# Patient Record
Sex: MALE | Race: WHITE | NOT HISPANIC OR LATINO | Employment: OTHER
[De-identification: names, ages, dates, MRNs, and addresses within clinical notes are randomized per-mention and may not be internally consistent; named-entity substitution may affect disease eponyms.]

---

## 2017-01-30 ENCOUNTER — RECORDS - HEALTHEAST (OUTPATIENT)
Dept: ADMINISTRATIVE | Facility: OTHER | Age: 72
End: 2017-01-30

## 2017-02-08 ENCOUNTER — COMMUNICATION - HEALTHEAST (OUTPATIENT)
Dept: FAMILY MEDICINE | Facility: CLINIC | Age: 72
End: 2017-02-08

## 2017-02-08 DIAGNOSIS — I10 BENIGN ESSENTIAL HYPERTENSION: ICD-10-CM

## 2017-04-10 ENCOUNTER — COMMUNICATION - HEALTHEAST (OUTPATIENT)
Dept: FAMILY MEDICINE | Facility: CLINIC | Age: 72
End: 2017-04-10

## 2017-04-10 DIAGNOSIS — E11.9 DM (DIABETES MELLITUS) (H): ICD-10-CM

## 2017-04-24 ENCOUNTER — OFFICE VISIT - HEALTHEAST (OUTPATIENT)
Dept: FAMILY MEDICINE | Facility: CLINIC | Age: 72
End: 2017-04-24

## 2017-04-24 ENCOUNTER — COMMUNICATION - HEALTHEAST (OUTPATIENT)
Dept: FAMILY MEDICINE | Facility: CLINIC | Age: 72
End: 2017-04-24

## 2017-04-24 DIAGNOSIS — J32.9 SINUSITIS: ICD-10-CM

## 2017-04-24 DIAGNOSIS — E11.9 DM2 (DIABETES MELLITUS, TYPE 2) (H): ICD-10-CM

## 2017-04-24 LAB — HBA1C MFR BLD: 7 % (ref 3.5–6)

## 2017-04-24 ASSESSMENT — MIFFLIN-ST. JEOR: SCORE: 1480.62

## 2017-05-09 ENCOUNTER — COMMUNICATION - HEALTHEAST (OUTPATIENT)
Dept: FAMILY MEDICINE | Facility: CLINIC | Age: 72
End: 2017-05-09

## 2017-05-09 DIAGNOSIS — I10 BENIGN ESSENTIAL HYPERTENSION: ICD-10-CM

## 2017-05-26 ENCOUNTER — RECORDS - HEALTHEAST (OUTPATIENT)
Dept: ADMINISTRATIVE | Facility: OTHER | Age: 72
End: 2017-05-26

## 2017-06-12 ENCOUNTER — RECORDS - HEALTHEAST (OUTPATIENT)
Dept: ADMINISTRATIVE | Facility: OTHER | Age: 72
End: 2017-06-12

## 2017-07-03 ENCOUNTER — COMMUNICATION - HEALTHEAST (OUTPATIENT)
Dept: FAMILY MEDICINE | Facility: CLINIC | Age: 72
End: 2017-07-03

## 2017-07-03 DIAGNOSIS — E11.9 DM (DIABETES MELLITUS) (H): ICD-10-CM

## 2017-07-03 DIAGNOSIS — E11.9 DIABETES MELLITUS (H): ICD-10-CM

## 2017-07-26 ENCOUNTER — OFFICE VISIT - HEALTHEAST (OUTPATIENT)
Dept: FAMILY MEDICINE | Facility: CLINIC | Age: 72
End: 2017-07-26

## 2017-07-26 ENCOUNTER — COMMUNICATION - HEALTHEAST (OUTPATIENT)
Dept: FAMILY MEDICINE | Facility: CLINIC | Age: 72
End: 2017-07-26

## 2017-07-26 DIAGNOSIS — E11.9 DM2 (DIABETES MELLITUS, TYPE 2) (H): ICD-10-CM

## 2017-07-26 DIAGNOSIS — B07.9 VERRUCA VULGARIS: ICD-10-CM

## 2017-07-26 DIAGNOSIS — I10 ESSENTIAL HYPERTENSION, BENIGN: ICD-10-CM

## 2017-07-26 LAB — HBA1C MFR BLD: 6.6 % (ref 3.5–6)

## 2017-07-26 ASSESSMENT — MIFFLIN-ST. JEOR: SCORE: 1467.01

## 2017-08-16 ENCOUNTER — OFFICE VISIT - HEALTHEAST (OUTPATIENT)
Dept: PODIATRY | Age: 72
End: 2017-08-16

## 2017-08-16 DIAGNOSIS — L84 CALLUS OF FOOT: ICD-10-CM

## 2017-08-16 DIAGNOSIS — R22.41 FOOT MASS, RIGHT: ICD-10-CM

## 2017-08-16 ASSESSMENT — MIFFLIN-ST. JEOR: SCORE: 1467.01

## 2017-09-01 ENCOUNTER — COMMUNICATION - HEALTHEAST (OUTPATIENT)
Dept: FAMILY MEDICINE | Facility: CLINIC | Age: 72
End: 2017-09-01

## 2017-09-01 DIAGNOSIS — E11.9 DM (DIABETES MELLITUS) (H): ICD-10-CM

## 2017-12-12 ENCOUNTER — COMMUNICATION - HEALTHEAST (OUTPATIENT)
Dept: FAMILY MEDICINE | Facility: CLINIC | Age: 72
End: 2017-12-12

## 2017-12-12 DIAGNOSIS — E11.9 DM2 (DIABETES MELLITUS, TYPE 2) (H): ICD-10-CM

## 2018-01-03 ENCOUNTER — COMMUNICATION - HEALTHEAST (OUTPATIENT)
Dept: LAB | Facility: CLINIC | Age: 73
End: 2018-01-03

## 2018-01-03 ENCOUNTER — OFFICE VISIT - HEALTHEAST (OUTPATIENT)
Dept: FAMILY MEDICINE | Facility: CLINIC | Age: 73
End: 2018-01-03

## 2018-01-03 DIAGNOSIS — E11.9 DM2 (DIABETES MELLITUS, TYPE 2) (H): ICD-10-CM

## 2018-01-03 DIAGNOSIS — M50.30 DEGENERATION OF CERVICAL INTERVERTEBRAL DISC: ICD-10-CM

## 2018-01-03 LAB
CHOLEST SERPL-MCNC: 170 MG/DL
CREAT UR-MCNC: 103.6 MG/DL
FASTING STATUS PATIENT QL REPORTED: NO
HBA1C MFR BLD: 6.9 % (ref 3.5–6)
HDLC SERPL-MCNC: 49 MG/DL
LDLC SERPL CALC-MCNC: 101 MG/DL
MICROALBUMIN UR-MCNC: 7.87 MG/DL (ref 0–1.99)
MICROALBUMIN/CREAT UR: 76 MG/G
TRIGL SERPL-MCNC: 98 MG/DL

## 2018-01-03 ASSESSMENT — MIFFLIN-ST. JEOR: SCORE: 1498.53

## 2018-01-04 ENCOUNTER — COMMUNICATION - HEALTHEAST (OUTPATIENT)
Dept: FAMILY MEDICINE | Facility: CLINIC | Age: 73
End: 2018-01-04

## 2018-05-02 ENCOUNTER — COMMUNICATION - HEALTHEAST (OUTPATIENT)
Dept: FAMILY MEDICINE | Facility: CLINIC | Age: 73
End: 2018-05-02

## 2018-05-02 DIAGNOSIS — I10 BENIGN ESSENTIAL HYPERTENSION: ICD-10-CM

## 2018-05-25 ENCOUNTER — RECORDS - HEALTHEAST (OUTPATIENT)
Dept: ADMINISTRATIVE | Facility: OTHER | Age: 73
End: 2018-05-25

## 2018-06-04 ENCOUNTER — OFFICE VISIT - HEALTHEAST (OUTPATIENT)
Dept: FAMILY MEDICINE | Facility: CLINIC | Age: 73
End: 2018-06-04

## 2018-06-04 DIAGNOSIS — R22.42 FOOT MASS, LEFT: ICD-10-CM

## 2018-06-04 DIAGNOSIS — I10 ESSENTIAL HYPERTENSION, BENIGN: ICD-10-CM

## 2018-06-04 DIAGNOSIS — E11.9 DM2 (DIABETES MELLITUS, TYPE 2) (H): ICD-10-CM

## 2018-06-04 LAB
ALBUMIN SERPL-MCNC: 3.9 G/DL (ref 3.5–5)
ALBUMIN UR-MCNC: NEGATIVE MG/DL
ALP SERPL-CCNC: 88 U/L (ref 45–120)
ALT SERPL W P-5'-P-CCNC: 19 U/L (ref 0–45)
ANION GAP SERPL CALCULATED.3IONS-SCNC: 8 MMOL/L (ref 5–18)
APPEARANCE UR: CLEAR
AST SERPL W P-5'-P-CCNC: 22 U/L (ref 0–40)
BACTERIA #/AREA URNS HPF: ABNORMAL HPF
BILIRUB SERPL-MCNC: 0.5 MG/DL (ref 0–1)
BILIRUB UR QL STRIP: NEGATIVE
BUN SERPL-MCNC: 21 MG/DL (ref 8–28)
CALCIUM SERPL-MCNC: 9.1 MG/DL (ref 8.5–10.5)
CHLORIDE BLD-SCNC: 109 MMOL/L (ref 98–107)
CO2 SERPL-SCNC: 24 MMOL/L (ref 22–31)
COLOR UR AUTO: YELLOW
CREAT SERPL-MCNC: 0.77 MG/DL (ref 0.7–1.3)
GFR SERPL CREATININE-BSD FRML MDRD: >60 ML/MIN/1.73M2
GLUCOSE BLD-MCNC: 125 MG/DL (ref 70–125)
GLUCOSE UR STRIP-MCNC: NEGATIVE MG/DL
HBA1C MFR BLD: 6.7 % (ref 3.5–6)
HGB UR QL STRIP: ABNORMAL
KETONES UR STRIP-MCNC: NEGATIVE MG/DL
LEUKOCYTE ESTERASE UR QL STRIP: NEGATIVE
NITRATE UR QL: NEGATIVE
PH UR STRIP: 5.5 [PH] (ref 5–8)
POTASSIUM BLD-SCNC: 4 MMOL/L (ref 3.5–5)
PROT SERPL-MCNC: 6.8 G/DL (ref 6–8)
RBC #/AREA URNS AUTO: ABNORMAL HPF
SODIUM SERPL-SCNC: 141 MMOL/L (ref 136–145)
SP GR UR STRIP: 1.02 (ref 1–1.03)
SQUAMOUS #/AREA URNS AUTO: ABNORMAL LPF
UROBILINOGEN UR STRIP-ACNC: ABNORMAL
WBC #/AREA URNS AUTO: ABNORMAL HPF

## 2018-06-04 RX ORDER — TAMSULOSIN HYDROCHLORIDE 0.4 MG/1
0.4 CAPSULE ORAL DAILY
Refills: 3 | Status: SHIPPED | COMMUNITY
Start: 2018-05-25

## 2018-06-04 ASSESSMENT — MIFFLIN-ST. JEOR: SCORE: 1491.27

## 2018-06-06 ENCOUNTER — COMMUNICATION - HEALTHEAST (OUTPATIENT)
Dept: FAMILY MEDICINE | Facility: CLINIC | Age: 73
End: 2018-06-06

## 2018-06-12 ENCOUNTER — RECORDS - HEALTHEAST (OUTPATIENT)
Dept: ADMINISTRATIVE | Facility: OTHER | Age: 73
End: 2018-06-12

## 2018-06-18 ENCOUNTER — RECORDS - HEALTHEAST (OUTPATIENT)
Dept: ADMINISTRATIVE | Facility: OTHER | Age: 73
End: 2018-06-18

## 2018-07-03 ENCOUNTER — RECORDS - HEALTHEAST (OUTPATIENT)
Dept: ADMINISTRATIVE | Facility: OTHER | Age: 73
End: 2018-07-03

## 2018-07-29 ENCOUNTER — COMMUNICATION - HEALTHEAST (OUTPATIENT)
Dept: FAMILY MEDICINE | Facility: CLINIC | Age: 73
End: 2018-07-29

## 2018-07-29 DIAGNOSIS — I10 BENIGN ESSENTIAL HYPERTENSION: ICD-10-CM

## 2018-12-01 ENCOUNTER — COMMUNICATION - HEALTHEAST (OUTPATIENT)
Dept: FAMILY MEDICINE | Facility: CLINIC | Age: 73
End: 2018-12-01

## 2018-12-01 DIAGNOSIS — E11.9 DM2 (DIABETES MELLITUS, TYPE 2) (H): ICD-10-CM

## 2018-12-05 ENCOUNTER — OFFICE VISIT - HEALTHEAST (OUTPATIENT)
Dept: FAMILY MEDICINE | Facility: CLINIC | Age: 73
End: 2018-12-05

## 2018-12-05 DIAGNOSIS — M15.9 OSTEOARTHRITIS OF MULTIPLE JOINTS, UNSPECIFIED OSTEOARTHRITIS TYPE: ICD-10-CM

## 2018-12-05 DIAGNOSIS — N23 RENAL COLIC ON RIGHT SIDE: ICD-10-CM

## 2018-12-05 DIAGNOSIS — M72.2 PLANTAR FASCIAL FIBROMATOSIS: ICD-10-CM

## 2018-12-05 DIAGNOSIS — E11.9 DM2 (DIABETES MELLITUS, TYPE 2) (H): ICD-10-CM

## 2018-12-05 LAB
ALBUMIN SERPL-MCNC: 4 G/DL (ref 3.5–5)
ALBUMIN UR-MCNC: NEGATIVE MG/DL
ALP SERPL-CCNC: 87 U/L (ref 45–120)
ALT SERPL W P-5'-P-CCNC: 21 U/L (ref 0–45)
ANION GAP SERPL CALCULATED.3IONS-SCNC: 9 MMOL/L (ref 5–18)
APPEARANCE UR: CLEAR
AST SERPL W P-5'-P-CCNC: 24 U/L (ref 0–40)
BILIRUB SERPL-MCNC: 0.6 MG/DL (ref 0–1)
BILIRUB UR QL STRIP: NEGATIVE
BUN SERPL-MCNC: 15 MG/DL (ref 8–28)
CALCIUM SERPL-MCNC: 9.3 MG/DL (ref 8.5–10.5)
CHLORIDE BLD-SCNC: 104 MMOL/L (ref 98–107)
CO2 SERPL-SCNC: 24 MMOL/L (ref 22–31)
COLOR UR AUTO: YELLOW
CREAT SERPL-MCNC: 0.78 MG/DL (ref 0.7–1.3)
GFR SERPL CREATININE-BSD FRML MDRD: >60 ML/MIN/1.73M2
GLUCOSE BLD-MCNC: 117 MG/DL (ref 70–125)
GLUCOSE UR STRIP-MCNC: NEGATIVE MG/DL
HBA1C MFR BLD: 7.3 % (ref 3.5–6)
HGB UR QL STRIP: NEGATIVE
KETONES UR STRIP-MCNC: NEGATIVE MG/DL
LEUKOCYTE ESTERASE UR QL STRIP: NEGATIVE
NITRATE UR QL: NEGATIVE
PH UR STRIP: 6.5 [PH] (ref 5–8)
POTASSIUM BLD-SCNC: 4.4 MMOL/L (ref 3.5–5)
PROT SERPL-MCNC: 7.2 G/DL (ref 6–8)
SODIUM SERPL-SCNC: 137 MMOL/L (ref 136–145)
SP GR UR STRIP: 1.01 (ref 1–1.03)
UROBILINOGEN UR STRIP-ACNC: NORMAL

## 2018-12-06 ENCOUNTER — COMMUNICATION - HEALTHEAST (OUTPATIENT)
Dept: FAMILY MEDICINE | Facility: CLINIC | Age: 73
End: 2018-12-06

## 2019-01-18 ENCOUNTER — COMMUNICATION - HEALTHEAST (OUTPATIENT)
Dept: FAMILY MEDICINE | Facility: CLINIC | Age: 74
End: 2019-01-18

## 2019-01-18 DIAGNOSIS — E11.9 DM2 (DIABETES MELLITUS, TYPE 2) (H): ICD-10-CM

## 2019-01-21 ENCOUNTER — COMMUNICATION - HEALTHEAST (OUTPATIENT)
Dept: SCHEDULING | Facility: CLINIC | Age: 74
End: 2019-01-21

## 2019-06-05 ENCOUNTER — OFFICE VISIT - HEALTHEAST (OUTPATIENT)
Dept: FAMILY MEDICINE | Facility: CLINIC | Age: 74
End: 2019-06-05

## 2019-06-05 DIAGNOSIS — E11.8 TYPE 2 DIABETES MELLITUS WITH COMPLICATION, WITHOUT LONG-TERM CURRENT USE OF INSULIN (H): ICD-10-CM

## 2019-06-05 DIAGNOSIS — M15.0 PRIMARY OSTEOARTHRITIS INVOLVING MULTIPLE JOINTS: ICD-10-CM

## 2019-06-05 LAB
CREAT UR-MCNC: 101.3 MG/DL
CREAT UR-MCNC: 101.3 MG/DL
HBA1C MFR BLD: 7 % (ref 3.5–6)
MICROALBUMIN UR-MCNC: 1.98 MG/DL (ref 0–1.99)
MICROALBUMIN/CREAT UR: 19.5 MG/G

## 2019-06-05 ASSESSMENT — MIFFLIN-ST. JEOR: SCORE: 1472.91

## 2019-06-06 ENCOUNTER — COMMUNICATION - HEALTHEAST (OUTPATIENT)
Dept: FAMILY MEDICINE | Facility: CLINIC | Age: 74
End: 2019-06-06

## 2019-06-17 ENCOUNTER — RECORDS - HEALTHEAST (OUTPATIENT)
Dept: ADMINISTRATIVE | Facility: OTHER | Age: 74
End: 2019-06-17

## 2019-06-24 ENCOUNTER — RECORDS - HEALTHEAST (OUTPATIENT)
Dept: HEALTH INFORMATION MANAGEMENT | Facility: CLINIC | Age: 74
End: 2019-06-24

## 2019-07-10 ENCOUNTER — RECORDS - HEALTHEAST (OUTPATIENT)
Dept: ADMINISTRATIVE | Facility: OTHER | Age: 74
End: 2019-07-10

## 2019-07-25 ENCOUNTER — COMMUNICATION - HEALTHEAST (OUTPATIENT)
Dept: FAMILY MEDICINE | Facility: CLINIC | Age: 74
End: 2019-07-25

## 2019-07-25 DIAGNOSIS — I10 BENIGN ESSENTIAL HYPERTENSION: ICD-10-CM

## 2019-08-25 ENCOUNTER — COMMUNICATION - HEALTHEAST (OUTPATIENT)
Dept: FAMILY MEDICINE | Facility: CLINIC | Age: 74
End: 2019-08-25

## 2019-08-25 DIAGNOSIS — E11.9 DM2 (DIABETES MELLITUS, TYPE 2) (H): ICD-10-CM

## 2019-10-14 ENCOUNTER — RECORDS - HEALTHEAST (OUTPATIENT)
Dept: ADMINISTRATIVE | Facility: OTHER | Age: 74
End: 2019-10-14

## 2019-10-24 ENCOUNTER — RECORDS - HEALTHEAST (OUTPATIENT)
Dept: ADMINISTRATIVE | Facility: OTHER | Age: 74
End: 2019-10-24

## 2019-10-24 ENCOUNTER — RECORDS - HEALTHEAST (OUTPATIENT)
Dept: HEALTH INFORMATION MANAGEMENT | Facility: CLINIC | Age: 74
End: 2019-10-24

## 2019-12-05 ENCOUNTER — COMMUNICATION - HEALTHEAST (OUTPATIENT)
Dept: LAB | Facility: CLINIC | Age: 74
End: 2019-12-05

## 2019-12-05 ENCOUNTER — OFFICE VISIT - HEALTHEAST (OUTPATIENT)
Dept: FAMILY MEDICINE | Facility: CLINIC | Age: 74
End: 2019-12-05

## 2019-12-05 DIAGNOSIS — Z12.11 SCREEN FOR COLON CANCER: ICD-10-CM

## 2019-12-05 DIAGNOSIS — E11.65 TYPE 2 DIABETES MELLITUS WITH HYPERGLYCEMIA, WITHOUT LONG-TERM CURRENT USE OF INSULIN (H): ICD-10-CM

## 2019-12-05 LAB
CREAT UR-MCNC: 82.8 MG/DL
HBA1C MFR BLD: 6.8 % (ref 3.5–6)
MICROALBUMIN UR-MCNC: 2.01 MG/DL (ref 0–1.99)
MICROALBUMIN/CREAT UR: 24.3 MG/G

## 2020-02-19 ENCOUNTER — COMMUNICATION - HEALTHEAST (OUTPATIENT)
Dept: FAMILY MEDICINE | Facility: CLINIC | Age: 75
End: 2020-02-19

## 2020-02-19 DIAGNOSIS — E11.9 DM2 (DIABETES MELLITUS, TYPE 2) (H): ICD-10-CM

## 2020-03-02 ENCOUNTER — COMMUNICATION - HEALTHEAST (OUTPATIENT)
Dept: FAMILY MEDICINE | Facility: CLINIC | Age: 75
End: 2020-03-02

## 2020-03-02 DIAGNOSIS — E11.9 DM2 (DIABETES MELLITUS, TYPE 2) (H): ICD-10-CM

## 2020-07-08 ENCOUNTER — COMMUNICATION - HEALTHEAST (OUTPATIENT)
Dept: FAMILY MEDICINE | Facility: CLINIC | Age: 75
End: 2020-07-08

## 2020-07-08 DIAGNOSIS — I10 BENIGN ESSENTIAL HYPERTENSION: ICD-10-CM

## 2020-07-09 ENCOUNTER — COMMUNICATION - HEALTHEAST (OUTPATIENT)
Dept: FAMILY MEDICINE | Facility: CLINIC | Age: 75
End: 2020-07-09

## 2020-07-09 ENCOUNTER — OFFICE VISIT - HEALTHEAST (OUTPATIENT)
Dept: FAMILY MEDICINE | Facility: CLINIC | Age: 75
End: 2020-07-09

## 2020-07-09 DIAGNOSIS — N23 RENAL COLIC ON RIGHT SIDE: ICD-10-CM

## 2020-07-09 DIAGNOSIS — E11.65 TYPE 2 DIABETES MELLITUS WITH HYPERGLYCEMIA, WITHOUT LONG-TERM CURRENT USE OF INSULIN (H): ICD-10-CM

## 2020-07-09 DIAGNOSIS — J45.20 MILD INTERMITTENT ASTHMA WITHOUT COMPLICATION: ICD-10-CM

## 2020-07-09 LAB
CREAT UR-MCNC: 124.5 MG/DL
CREAT UR-MCNC: 124.5 MG/DL
HBA1C MFR BLD: 7.3 % (ref 3.5–6)
MICROALBUMIN UR-MCNC: 4.46 MG/DL (ref 0–1.99)
MICROALBUMIN/CREAT UR: 35.8 MG/G

## 2020-07-09 RX ORDER — DORZOLAMIDE HCL 20 MG/ML
SOLUTION/ DROPS OPHTHALMIC
Status: SHIPPED | COMMUNITY
Start: 2020-05-19

## 2020-07-09 ASSESSMENT — MIFFLIN-ST. JEOR: SCORE: 1492.86

## 2020-07-20 ENCOUNTER — OFFICE VISIT - HEALTHEAST (OUTPATIENT)
Dept: FAMILY MEDICINE | Facility: CLINIC | Age: 75
End: 2020-07-20

## 2020-07-20 DIAGNOSIS — H25.9 AGE-RELATED CATARACT OF BOTH EYES, UNSPECIFIED AGE-RELATED CATARACT TYPE: ICD-10-CM

## 2020-07-20 DIAGNOSIS — Z01.818 PREOP GENERAL PHYSICAL EXAM: ICD-10-CM

## 2020-07-20 LAB
BASOPHILS # BLD AUTO: 0.1 THOU/UL (ref 0–0.2)
BASOPHILS NFR BLD AUTO: 1 % (ref 0–2)
EOSINOPHIL # BLD AUTO: 0.1 THOU/UL (ref 0–0.4)
EOSINOPHIL NFR BLD AUTO: 1 % (ref 0–6)
ERYTHROCYTE [DISTWIDTH] IN BLOOD BY AUTOMATED COUNT: 13.2 % (ref 11–14.5)
HCT VFR BLD AUTO: 46.1 % (ref 40–54)
HGB BLD-MCNC: 14.8 G/DL (ref 14–18)
LYMPHOCYTES # BLD AUTO: 1.8 THOU/UL (ref 0.8–4.4)
LYMPHOCYTES NFR BLD AUTO: 27 % (ref 20–40)
MCH RBC QN AUTO: 29.2 PG (ref 27–34)
MCHC RBC AUTO-ENTMCNC: 32.1 G/DL (ref 32–36)
MCV RBC AUTO: 91 FL (ref 80–100)
MONOCYTES # BLD AUTO: 0.6 THOU/UL (ref 0–0.9)
MONOCYTES NFR BLD AUTO: 9 % (ref 2–10)
NEUTROPHILS # BLD AUTO: 4.2 THOU/UL (ref 2–7.7)
NEUTROPHILS NFR BLD AUTO: 62 % (ref 50–70)
PLATELET # BLD AUTO: 189 THOU/UL (ref 140–440)
PMV BLD AUTO: 10.6 FL (ref 8.5–12.5)
POTASSIUM BLD-SCNC: 4 MMOL/L (ref 3.5–5)
RBC # BLD AUTO: 5.06 MILL/UL (ref 4.4–6.2)
WBC: 6.7 THOU/UL (ref 4–11)

## 2020-07-21 LAB
ATRIAL RATE - MUSE: 77 BPM
DIASTOLIC BLOOD PRESSURE - MUSE: NORMAL
INTERPRETATION ECG - MUSE: NORMAL
P AXIS - MUSE: 62 DEGREES
PR INTERVAL - MUSE: 148 MS
QRS DURATION - MUSE: 70 MS
QT - MUSE: 364 MS
QTC - MUSE: 411 MS
R AXIS - MUSE: 1 DEGREES
SYSTOLIC BLOOD PRESSURE - MUSE: NORMAL
T AXIS - MUSE: 30 DEGREES
VENTRICULAR RATE- MUSE: 77 BPM

## 2020-08-18 ENCOUNTER — COMMUNICATION - HEALTHEAST (OUTPATIENT)
Dept: FAMILY MEDICINE | Facility: CLINIC | Age: 75
End: 2020-08-18

## 2020-08-18 DIAGNOSIS — E11.9 DM2 (DIABETES MELLITUS, TYPE 2) (H): ICD-10-CM

## 2020-11-19 ENCOUNTER — COMMUNICATION - HEALTHEAST (OUTPATIENT)
Dept: FAMILY MEDICINE | Facility: CLINIC | Age: 75
End: 2020-11-19

## 2020-11-19 DIAGNOSIS — E11.9 DM2 (DIABETES MELLITUS, TYPE 2) (H): ICD-10-CM

## 2021-01-07 ENCOUNTER — OFFICE VISIT - HEALTHEAST (OUTPATIENT)
Dept: FAMILY MEDICINE | Facility: CLINIC | Age: 76
End: 2021-01-07

## 2021-01-07 ENCOUNTER — AMBULATORY - HEALTHEAST (OUTPATIENT)
Dept: FAMILY MEDICINE | Facility: CLINIC | Age: 76
End: 2021-01-07

## 2021-01-07 DIAGNOSIS — Z11.59 NEED FOR HEPATITIS C SCREENING TEST: ICD-10-CM

## 2021-01-07 DIAGNOSIS — E11.65 TYPE 2 DIABETES MELLITUS WITH HYPERGLYCEMIA, WITHOUT LONG-TERM CURRENT USE OF INSULIN (H): ICD-10-CM

## 2021-01-07 DIAGNOSIS — M72.2 PLANTAR FASCIAL FIBROMATOSIS OF RIGHT FOOT: ICD-10-CM

## 2021-01-07 DIAGNOSIS — N13.8 BPH WITH URINARY OBSTRUCTION: ICD-10-CM

## 2021-01-07 DIAGNOSIS — N40.1 BPH WITH URINARY OBSTRUCTION: ICD-10-CM

## 2021-01-07 DIAGNOSIS — I10 ESSENTIAL HYPERTENSION, BENIGN: ICD-10-CM

## 2021-01-07 DIAGNOSIS — J45.20 MILD INTERMITTENT ASTHMA WITHOUT COMPLICATION: ICD-10-CM

## 2021-01-07 DIAGNOSIS — R80.9 MICROALBUMINURIA: ICD-10-CM

## 2021-01-07 DIAGNOSIS — I25.10 CARDIOVASCULAR DISEASE: ICD-10-CM

## 2021-01-07 LAB
ANION GAP SERPL CALCULATED.3IONS-SCNC: 9 MMOL/L (ref 5–18)
BUN SERPL-MCNC: 20 MG/DL (ref 8–28)
CALCIUM SERPL-MCNC: 8.6 MG/DL (ref 8.5–10.5)
CHLORIDE BLD-SCNC: 104 MMOL/L (ref 98–107)
CHOLEST SERPL-MCNC: 181 MG/DL
CO2 SERPL-SCNC: 25 MMOL/L (ref 22–31)
CREAT SERPL-MCNC: 0.81 MG/DL (ref 0.7–1.3)
CREAT UR-MCNC: 143.1 MG/DL
FASTING STATUS PATIENT QL REPORTED: YES
GFR SERPL CREATININE-BSD FRML MDRD: >60 ML/MIN/1.73M2
GLUCOSE BLD-MCNC: 127 MG/DL (ref 70–125)
HBA1C MFR BLD: 7.1 %
HDLC SERPL-MCNC: 45 MG/DL
LDLC SERPL CALC-MCNC: 115 MG/DL
MICROALBUMIN UR-MCNC: 3.52 MG/DL (ref 0–1.99)
MICROALBUMIN/CREAT UR: 24.6 MG/G
POTASSIUM BLD-SCNC: 4 MMOL/L (ref 3.5–5)
SODIUM SERPL-SCNC: 138 MMOL/L (ref 136–145)
TRIGL SERPL-MCNC: 106 MG/DL

## 2021-01-07 RX ORDER — ATORVASTATIN CALCIUM 20 MG/1
20 TABLET, FILM COATED ORAL DAILY
Qty: 90 TABLET | Refills: 3 | Status: SHIPPED | OUTPATIENT
Start: 2021-01-07 | End: 2021-09-17

## 2021-01-08 ENCOUNTER — COMMUNICATION - HEALTHEAST (OUTPATIENT)
Dept: FAMILY MEDICINE | Facility: CLINIC | Age: 76
End: 2021-01-08

## 2021-01-08 LAB — HCV AB SERPL QL IA: NEGATIVE

## 2021-02-11 ENCOUNTER — COMMUNICATION - HEALTHEAST (OUTPATIENT)
Dept: FAMILY MEDICINE | Facility: CLINIC | Age: 76
End: 2021-02-11

## 2021-02-11 DIAGNOSIS — E11.9 DM2 (DIABETES MELLITUS, TYPE 2) (H): ICD-10-CM

## 2021-03-09 ENCOUNTER — AMBULATORY - HEALTHEAST (OUTPATIENT)
Dept: NURSING | Facility: CLINIC | Age: 76
End: 2021-03-09

## 2021-03-10 ENCOUNTER — COMMUNICATION - HEALTHEAST (OUTPATIENT)
Dept: SCHEDULING | Facility: CLINIC | Age: 76
End: 2021-03-10

## 2021-03-10 ENCOUNTER — OFFICE VISIT - HEALTHEAST (OUTPATIENT)
Dept: FAMILY MEDICINE | Facility: CLINIC | Age: 76
End: 2021-03-10

## 2021-03-10 DIAGNOSIS — M54.2 NECK PAIN: ICD-10-CM

## 2021-03-10 DIAGNOSIS — G44.209 TENSION HEADACHE: ICD-10-CM

## 2021-03-10 DIAGNOSIS — I10 ESSENTIAL HYPERTENSION, BENIGN: ICD-10-CM

## 2021-03-17 ENCOUNTER — COMMUNICATION - HEALTHEAST (OUTPATIENT)
Dept: FAMILY MEDICINE | Facility: CLINIC | Age: 76
End: 2021-03-17

## 2021-03-17 DIAGNOSIS — M54.2 NECK PAIN: ICD-10-CM

## 2021-03-17 DIAGNOSIS — G44.209 TENSION HEADACHE: ICD-10-CM

## 2021-03-19 ENCOUNTER — OFFICE VISIT - HEALTHEAST (OUTPATIENT)
Dept: FAMILY MEDICINE | Facility: CLINIC | Age: 76
End: 2021-03-19

## 2021-03-19 ENCOUNTER — RECORDS - HEALTHEAST (OUTPATIENT)
Dept: GENERAL RADIOLOGY | Facility: CLINIC | Age: 76
End: 2021-03-19

## 2021-03-19 DIAGNOSIS — I10 ESSENTIAL HYPERTENSION, BENIGN: ICD-10-CM

## 2021-03-19 DIAGNOSIS — G44.209 TENSION HEADACHE: ICD-10-CM

## 2021-03-19 DIAGNOSIS — E11.65 TYPE 2 DIABETES MELLITUS WITH HYPERGLYCEMIA, WITHOUT LONG-TERM CURRENT USE OF INSULIN (H): ICD-10-CM

## 2021-03-19 DIAGNOSIS — M54.2 CERVICALGIA: ICD-10-CM

## 2021-03-19 DIAGNOSIS — M54.2 NECK PAIN: ICD-10-CM

## 2021-03-30 ENCOUNTER — AMBULATORY - HEALTHEAST (OUTPATIENT)
Dept: NURSING | Facility: CLINIC | Age: 76
End: 2021-03-30

## 2021-04-19 ENCOUNTER — OFFICE VISIT - HEALTHEAST (OUTPATIENT)
Dept: FAMILY MEDICINE | Facility: CLINIC | Age: 76
End: 2021-04-19

## 2021-04-19 ENCOUNTER — COMMUNICATION - HEALTHEAST (OUTPATIENT)
Dept: FAMILY MEDICINE | Facility: CLINIC | Age: 76
End: 2021-04-19

## 2021-04-19 DIAGNOSIS — M50.30 DEGENERATION OF CERVICAL INTERVERTEBRAL DISC: ICD-10-CM

## 2021-04-19 DIAGNOSIS — M54.2 CERVICALGIA: ICD-10-CM

## 2021-05-09 ENCOUNTER — COMMUNICATION - HEALTHEAST (OUTPATIENT)
Dept: FAMILY MEDICINE | Facility: CLINIC | Age: 76
End: 2021-05-09

## 2021-05-09 DIAGNOSIS — E11.9 DM2 (DIABETES MELLITUS, TYPE 2) (H): ICD-10-CM

## 2021-05-11 ENCOUNTER — OFFICE VISIT - HEALTHEAST (OUTPATIENT)
Dept: FAMILY MEDICINE | Facility: CLINIC | Age: 76
End: 2021-05-11

## 2021-05-11 DIAGNOSIS — M54.2 CERVICALGIA: ICD-10-CM

## 2021-05-11 DIAGNOSIS — H40.003 GLAUCOMA SUSPECT, BILATERAL: ICD-10-CM

## 2021-05-11 DIAGNOSIS — I10 ESSENTIAL HYPERTENSION, BENIGN: ICD-10-CM

## 2021-05-11 DIAGNOSIS — N40.1 BPH WITH URINARY OBSTRUCTION: ICD-10-CM

## 2021-05-11 DIAGNOSIS — N13.8 BPH WITH URINARY OBSTRUCTION: ICD-10-CM

## 2021-05-11 DIAGNOSIS — J45.20 MILD INTERMITTENT ASTHMA WITHOUT COMPLICATION: ICD-10-CM

## 2021-05-11 DIAGNOSIS — E66.3 OVERWEIGHT: ICD-10-CM

## 2021-05-11 DIAGNOSIS — E78.5 HYPERLIPIDEMIA LDL GOAL <100: ICD-10-CM

## 2021-05-11 DIAGNOSIS — Z12.11 COLON CANCER SCREENING: ICD-10-CM

## 2021-05-11 DIAGNOSIS — Z00.00 ROUTINE GENERAL MEDICAL EXAMINATION AT A HEALTH CARE FACILITY: ICD-10-CM

## 2021-05-11 DIAGNOSIS — E11.65 TYPE 2 DIABETES MELLITUS WITH HYPERGLYCEMIA, WITHOUT LONG-TERM CURRENT USE OF INSULIN (H): ICD-10-CM

## 2021-05-11 LAB
ANION GAP SERPL CALCULATED.3IONS-SCNC: 9 MMOL/L (ref 5–18)
BUN SERPL-MCNC: 19 MG/DL (ref 8–28)
CALCIUM SERPL-MCNC: 9.3 MG/DL (ref 8.5–10.5)
CHLORIDE BLD-SCNC: 102 MMOL/L (ref 98–107)
CHOLEST SERPL-MCNC: 129 MG/DL
CO2 SERPL-SCNC: 27 MMOL/L (ref 22–31)
CREAT SERPL-MCNC: 0.8 MG/DL (ref 0.7–1.3)
CREAT UR-MCNC: 43.5 MG/DL
FASTING STATUS PATIENT QL REPORTED: YES
GFR SERPL CREATININE-BSD FRML MDRD: >60 ML/MIN/1.73M2
GLUCOSE BLD-MCNC: 143 MG/DL (ref 70–125)
HBA1C MFR BLD: 7.2 %
HDLC SERPL-MCNC: 43 MG/DL
LDLC SERPL CALC-MCNC: 64 MG/DL
MICROALBUMIN UR-MCNC: 1.98 MG/DL (ref 0–1.99)
MICROALBUMIN/CREAT UR: 45.5 MG/G
POTASSIUM BLD-SCNC: 4.1 MMOL/L (ref 3.5–5)
SODIUM SERPL-SCNC: 138 MMOL/L (ref 136–145)
TRIGL SERPL-MCNC: 109 MG/DL

## 2021-05-11 RX ORDER — LOSARTAN POTASSIUM 50 MG/1
50 TABLET ORAL DAILY
Qty: 90 TABLET | Refills: 3 | Status: SHIPPED | OUTPATIENT
Start: 2021-05-11 | End: 2022-02-11

## 2021-05-11 RX ORDER — ALBUTEROL SULFATE 90 UG/1
1 AEROSOL, METERED RESPIRATORY (INHALATION) EVERY 4 HOURS PRN
Qty: 1 INHALER | Refills: 2 | Status: SHIPPED | OUTPATIENT
Start: 2021-05-11

## 2021-05-11 ASSESSMENT — MIFFLIN-ST. JEOR: SCORE: 1481.75

## 2021-05-12 ENCOUNTER — COMMUNICATION - HEALTHEAST (OUTPATIENT)
Dept: FAMILY MEDICINE | Facility: CLINIC | Age: 76
End: 2021-05-12

## 2021-05-13 ENCOUNTER — HOSPITAL ENCOUNTER (OUTPATIENT)
Dept: PHYSICAL MEDICINE AND REHAB | Facility: CLINIC | Age: 76
Discharge: HOME | End: 2021-05-13
Attending: FAMILY MEDICINE

## 2021-05-13 DIAGNOSIS — M54.2 CHRONIC NECK PAIN: ICD-10-CM

## 2021-05-13 DIAGNOSIS — G89.29 CHRONIC INTRACTABLE HEADACHE, UNSPECIFIED HEADACHE TYPE: ICD-10-CM

## 2021-05-13 DIAGNOSIS — R51.9 CHRONIC INTRACTABLE HEADACHE, UNSPECIFIED HEADACHE TYPE: ICD-10-CM

## 2021-05-13 DIAGNOSIS — G89.29 CHRONIC NECK PAIN: ICD-10-CM

## 2021-05-13 ASSESSMENT — MIFFLIN-ST. JEOR: SCORE: 1480.62

## 2021-05-21 ENCOUNTER — HOSPITAL ENCOUNTER (OUTPATIENT)
Dept: MRI IMAGING | Facility: HOSPITAL | Age: 76
Discharge: HOME | End: 2021-05-21
Attending: PHYSICIAN ASSISTANT

## 2021-05-21 DIAGNOSIS — R51.9 CHRONIC INTRACTABLE HEADACHE, UNSPECIFIED HEADACHE TYPE: ICD-10-CM

## 2021-05-21 DIAGNOSIS — G89.29 CHRONIC NECK PAIN: ICD-10-CM

## 2021-05-21 DIAGNOSIS — M54.2 CHRONIC NECK PAIN: ICD-10-CM

## 2021-05-21 DIAGNOSIS — G89.29 CHRONIC INTRACTABLE HEADACHE, UNSPECIFIED HEADACHE TYPE: ICD-10-CM

## 2021-05-24 ENCOUNTER — COMMUNICATION - HEALTHEAST (OUTPATIENT)
Dept: PHYSICAL MEDICINE AND REHAB | Facility: CLINIC | Age: 76
End: 2021-05-24

## 2021-05-25 ENCOUNTER — HOSPITAL ENCOUNTER (OUTPATIENT)
Dept: PHYSICAL MEDICINE AND REHAB | Facility: CLINIC | Age: 76
Discharge: HOME | End: 2021-05-25
Attending: PHYSICIAN ASSISTANT
Payer: COMMERCIAL

## 2021-05-25 DIAGNOSIS — G89.29 CHRONIC NECK PAIN: ICD-10-CM

## 2021-05-25 DIAGNOSIS — G89.29 CHRONIC INTRACTABLE HEADACHE, UNSPECIFIED HEADACHE TYPE: ICD-10-CM

## 2021-05-25 DIAGNOSIS — M54.2 CHRONIC NECK PAIN: ICD-10-CM

## 2021-05-25 DIAGNOSIS — R51.9 CHRONIC INTRACTABLE HEADACHE, UNSPECIFIED HEADACHE TYPE: ICD-10-CM

## 2021-05-25 ASSESSMENT — MIFFLIN-ST. JEOR: SCORE: 1485.15

## 2021-05-27 VITALS
WEIGHT: 177 LBS | OXYGEN SATURATION: 98 % | HEART RATE: 79 BPM | SYSTOLIC BLOOD PRESSURE: 154 MMHG | BODY MASS INDEX: 28.77 KG/M2 | BODY MASS INDEX: 28.45 KG/M2 | HEIGHT: 66 IN | HEIGHT: 66 IN | WEIGHT: 179 LBS | DIASTOLIC BLOOD PRESSURE: 84 MMHG

## 2021-05-28 ASSESSMENT — ASTHMA QUESTIONNAIRES
ACT_TOTALSCORE: 25

## 2021-05-29 NOTE — PROGRESS NOTES
Iron assessment:     1. Type 2 diabetes mellitus with complication, without long-term current use of insulin (H)  Glycosylated Hemoglobin A1c    Creatinine, Random Urine    Microalbumin, Random Urine   2. Primary osteoarthritis involving multiple joints         Plan:     1. Type 2 diabetes mellitus with complication, without long-term current use of insulin (H)  *  - Glycosylated Hemoglobin A1c  - Creatinine, Random Urine  - Microalbumin, Random Urine    2. Primary osteoarthritis involving multiple joints  Patient will continue to take Tylenol twice daily      Subjective:   I am seeing Tashi for follow-up of diabetes mellitus type 2.  Patient brings in his logbook blood sugars are between 101 130.  He has no complaints he has no dysphasia shortness of breath gastroparesis abdominal pain diarrhea constipation urgency frequency dysuria.  He reports pain in multiple joints well managed with Tylenol.  The patient is planning on cataract surgery within the month and we will do a preoperative exam at that time all medical questions asked and answered I personally reviewed family social history surgeries allergies problems list we will see the patient for follow-up 6 months appropriate laboratory will be ordered.    Review of Systems: A complete 14 point review of systems was obtained and is negative or as stated in the history of present illness.    Past Medical History:   Diagnosis Date     ASCVD (arteriosclerotic cardiovascular disease)      Asthma      Diabetes mellitus, type II (H)      Gallstones      Glaucoma      Gout      HTN (hypertension)      Hydronephrosis of right kidney      No family history on file.  Past Surgical History:   Procedure Laterality Date     CYSTOSCOPY W/ URETERAL STENT PLACEMENT Right 3/27/2016    Procedure: CYSTOSCOPY, RIGHT URETERAL STENT PLACEMENT, RIGHT RETROGRADE PYLOGRAM;  Surgeon: Salvador Mina MD;  Location: [facility];  Service:      Social History     Tobacco Use      "Smoking status: Never Smoker     Smokeless tobacco: Never Used   Substance Use Topics     Alcohol use: No     Drug use: No         Objective:   /76 (Patient Site: Left Arm, Patient Position: Sitting, Cuff Size: Adult Regular)   Pulse 68   Ht 5' 6\" (1.676 m)   Wt 175 lb 4.8 oz (79.5 kg)   SpO2 97%   BMI 28.29 kg/m      General Appearance:  Alert, cooperative, no distress  Head:  Normocephalic, no obvious abnormality  Ears: TM anatomy normal  Eyes:  PERRL, EOM's intact, conjunctiva and corneas clear  Nose:  Nares symmetrical, septum midline, mucosa pink, no sinus tenderness  Throat:  Lips, tongue, and mucosa are moist, pink, and intact  Neck:  Supple, symmetrical, trachea midline, no adenopathy; thyroid: no enlargement, symmetric,no tenderness/mass/nodules; no carotid bruit, no JVD  Back:  Symmetrical, no curvature, ROM normal, no CVA tenderness  Chest/Breast:  No mass or tenderness  Lungs:  Clear to auscultation bilaterally, respirations unlabored   Heart:  Normal PMI, regular rate & rhythm, S1 and S2 normal, no murmurs, rubs, or gallops  Abdomen:  Soft, non-tender, bowel sounds active all four quadrants, no mass, or organomegaly  Musculoskeletal:  Tone and strength strong and symmetrical, all extremities  Lymphatic:  No adenopathy  Skin/Hair/Nails:  Skin warm, dry, and intact, no rashes  Neurologic:  Alert and oriented x3, no cranial nerve deficits, normal strength and tone, gait steady  Extremities:  No edema.  Esra's sign negative.    Genitourinary: deferred  Pulses:  Equal bilaterally     I have had an Advance Directives discussion with the patient.      This note has been dictated using voice recognition software. Any grammatical or context distortions are unintentional and inherent to the the software.   "

## 2021-05-30 VITALS — BODY MASS INDEX: 28.45 KG/M2 | WEIGHT: 177 LBS | HEIGHT: 66 IN

## 2021-05-30 NOTE — TELEPHONE ENCOUNTER
Refill Approved    Rx renewed per Medication Renewal Policy. Medication was last renewed on 7/29/18.    Ramona Jon, [facility] Triage/Med Refill 7/25/2019     Requested Prescriptions   Pending Prescriptions Disp Refills     amLODIPine (NORVASC) 5 MG tablet [Pharmacy Med Name: AMLODIPINE BESYLATE 5 MG TAB] 90 tablet 3     Sig: TAKE 1 TABLET BY MOUTH EVERY DAY       Calcium-Channel Blockers Protocol Passed - 7/25/2019  1:11 AM        Passed - PCP or prescribing provider visit in past 12 months or next 3 months     Last office visit with prescriber/PCP: 6/5/2019 Clifton Mccray MD OR same dept: 6/5/2019 Clifton Mccray MD OR same specialty: 6/5/2019 Clifton Mccray MD  Last physical: Visit date not found Last MTM visit: Visit date not found   Next visit within 3 mo: Visit date not found  Next physical within 3 mo: Visit date not found  Prescriber OR PCP: Clifton Mccray MD  Last diagnosis associated with med order: 1. Benign essential hypertension  - amLODIPine (NORVASC) 5 MG tablet [Pharmacy Med Name: AMLODIPINE BESYLATE 5 MG TAB]; TAKE 1 TABLET BY MOUTH EVERY DAY  Dispense: 90 tablet; Refill: 3    If protocol passes may refill for 12 months if within 3 months of last provider visit (or a total of 15 months).             Passed - Blood pressure filed in past 12 months     BP Readings from Last 1 Encounters:   06/05/19 120/76

## 2021-05-31 VITALS — WEIGHT: 175.7 LBS | BODY MASS INDEX: 26.63 KG/M2 | HEIGHT: 68 IN

## 2021-05-31 VITALS — BODY MASS INDEX: 27.97 KG/M2 | WEIGHT: 174 LBS | HEIGHT: 66 IN

## 2021-05-31 NOTE — TELEPHONE ENCOUNTER
Refill Approved    Rx renewed per Medication Renewal Policy. Medication was last renewed on 12/3/18.    Hailee Weeks, [facility] [place] Connection Triage/Med Refill 8/25/2019     Requested Prescriptions   Pending Prescriptions Disp Refills     aspirin 81 MG EC tablet [Pharmacy Med Name: ASPIRIN EC 81 MG TABLET] 90 tablet 1     Sig: TAKE 1 TABLET (81 MG TOTAL) BY MOUTH DAILY.       Aspirin/Dipyridamole Refill Protocol Passed - 8/25/2019  8:21 AM        Passed - PCP or prescribing provider visit in past 12 months       Last office visit with prescriber/PCP: 6/5/2019 Clifton Mccray MD OR same dept: 6/5/2019 Clifton Mccray MD OR same specialty: 6/5/2019 Clifton Mccray MD  Last physical: Visit date not found Last MTM visit: Visit date not found    Next appt within 3 mo: Visit date not found Next physical within 3 mo: Visit date not found  Prescriber OR PCP: Clifton Mccray MD  Last diagnosis associated with med order: 1. DM2 (diabetes mellitus, type 2) (H)  - aspirin 81 MG EC tablet [Pharmacy Med Name: ASPIRIN EC 81 MG TABLET]; TAKE 1 TABLET (81 MG TOTAL) BY MOUTH DAILY.  Dispense: 90 tablet; Refill: 1    If protocol passes may refill for 6 months if within 3 months of last provider visit (or a total of 9 months).

## 2021-06-01 VITALS — HEIGHT: 68 IN | WEIGHT: 174.1 LBS | BODY MASS INDEX: 26.39 KG/M2

## 2021-06-02 VITALS — WEIGHT: 180 LBS | BODY MASS INDEX: 27.78 KG/M2

## 2021-06-02 VITALS — BODY MASS INDEX: 28.17 KG/M2 | WEIGHT: 175.3 LBS | HEIGHT: 66 IN

## 2021-06-03 VITALS
WEIGHT: 180.9 LBS | HEART RATE: 87 BPM | SYSTOLIC BLOOD PRESSURE: 130 MMHG | DIASTOLIC BLOOD PRESSURE: 70 MMHG | OXYGEN SATURATION: 97 % | BODY MASS INDEX: 29.2 KG/M2

## 2021-06-04 VITALS
DIASTOLIC BLOOD PRESSURE: 76 MMHG | OXYGEN SATURATION: 98 % | WEIGHT: 179.7 LBS | HEIGHT: 66 IN | SYSTOLIC BLOOD PRESSURE: 130 MMHG | HEART RATE: 77 BPM | BODY MASS INDEX: 28.88 KG/M2

## 2021-06-04 VITALS
BODY MASS INDEX: 28.91 KG/M2 | WEIGHT: 179.1 LBS | DIASTOLIC BLOOD PRESSURE: 76 MMHG | OXYGEN SATURATION: 94 % | HEART RATE: 83 BPM | SYSTOLIC BLOOD PRESSURE: 110 MMHG

## 2021-06-04 NOTE — PROGRESS NOTES
Assessment:     1. Type 2 diabetes mellitus with hyperglycemia, without long-term current use of insulin (H)  Glycosylated Hemoglobin A1c    Microalbumin, 24 hour Urine   2. Screen for colon cancer  Ambulatory referral for Colonoscopy       Plan:     1. Screen for colon cancer  Referral has been placed  - Ambulatory referral for Colonoscopy    2. Type 2 diabetes mellitus with hyperglycemia, without long-term current use of insulin (H)  Patient states although he forgot his logbook to bring him here to the clinic that his blood sugars are running 1 30-1 40 which is usual for him.  His last A1c was 7.1.  We plan on rechecking that here today.  Patient is contemplating ophthalmic surgery bilateral for the future and we will see him for preop exams when that event is scheduled otherwise I will see him in 6 months  - Glycosylated Hemoglobin A1c  - Microalbumin, 24 hour Urine      Subjective:   I am seeing Simba for his routine diabetes check and for evaluation of some minor skin problems.  He does get some urticarial areas here and they are due to his diabetic skin condition any tends to scratch them open where they start to bleed.  He puts antibiotic salve on them.  I note that his nails are very long and these need to be trimmed.  He may use a skin softener and simple petroleum jelly around these sores is acceptable at this point.  They are mainly on the dorsum of his right leg.  None are infected or have cellulitis at this time.  He denies any hearing loss gastroparesis abdominal pain constipation urgency frequency dysuria.  Gait and station normal he has not had any falls.  Weight is stable.  I plan on doing an A1c and a microalbumin we will see him for follow-up 6 months all medical questions asked were answered pleasure to see him again we will see him for his preop exam soon as he books his surgery times    Review of Systems: A complete 14 point review of systems was obtained and is negative or as stated in the  history of present illness.    Past Medical History:   Diagnosis Date     ASCVD (arteriosclerotic cardiovascular disease)      Asthma      Diabetes mellitus, type II (H)      Gallstones      Glaucoma      Gout      HTN (hypertension)      Hydronephrosis of right kidney      No family history on file.  Past Surgical History:   Procedure Laterality Date     CYSTOSCOPY W/ URETERAL STENT PLACEMENT Right 3/27/2016    Procedure: CYSTOSCOPY, RIGHT URETERAL STENT PLACEMENT, RIGHT RETROGRADE PYLOGRAM;  Surgeon: Salvador Mina MD;  Location: [facility];  Service:      Social History     Tobacco Use     Smoking status: Never Smoker     Smokeless tobacco: Never Used   Substance Use Topics     Alcohol use: No     Drug use: No         Objective:   /70 (Patient Site: Left Arm, Patient Position: Sitting, Cuff Size: Adult Regular)   Pulse 87   Wt 180 lb 14.4 oz (82.1 kg)   SpO2 97%   BMI 29.20 kg/m      General Appearance:  Alert, cooperative, no distress  Head:  Normocephalic, no obvious abnormality  Ears: TM anatomy normal  Eyes:  PERRL, EOM's intact, conjunctiva and corneas clear  Nose:  Nares symmetrical, septum midline, mucosa pink, no sinus tenderness  Throat:  Lips, tongue, and mucosa are moist, pink, and intact  Neck:  Supple, symmetrical, trachea midline, no adenopathy; thyroid: no enlargement, symmetric,no tenderness/mass/nodules; no carotid bruit, no JVD  Back:  Symmetrical, no curvature, ROM normal, no CVA tenderness  Chest/Breast:  No mass or tenderness  Lungs:  Clear to auscultation bilaterally, respirations unlabored   Heart:  Normal PMI, regular rate & rhythm, S1 and S2 normal, no murmurs, rubs, or gallops  Abdomen:  Soft, non-tender, bowel sounds active all four quadrants, no mass, or organomegaly  Musculoskeletal:  Tone and strength strong and symmetrical, all extremities  Lymphatic:  No adenopathy  Skin/Hair/Nails:  Skin warm, dry, and intact, no rashes  Neurologic:  Alert and oriented x3, no  cranial nerve deficits, normal strength and tone, gait steady  Extremities:  No edema.  Sera's sign negative.    Genitourinary: deferred  Pulses:  Equal bilaterally     I have had an Advance Directives discussion with the patient.      This note has been dictated using voice recognition software. Any grammatical or context distortions are unintentional and inherent to the the software.

## 2021-06-05 VITALS
HEART RATE: 80 BPM | TEMPERATURE: 97.6 F | BODY MASS INDEX: 28.41 KG/M2 | WEIGHT: 176 LBS | SYSTOLIC BLOOD PRESSURE: 110 MMHG | OXYGEN SATURATION: 97 % | DIASTOLIC BLOOD PRESSURE: 70 MMHG

## 2021-06-05 VITALS
WEIGHT: 179 LBS | OXYGEN SATURATION: 98 % | DIASTOLIC BLOOD PRESSURE: 80 MMHG | HEART RATE: 69 BPM | SYSTOLIC BLOOD PRESSURE: 140 MMHG | BODY MASS INDEX: 28.89 KG/M2 | TEMPERATURE: 97.7 F

## 2021-06-05 VITALS
BODY MASS INDEX: 28.73 KG/M2 | WEIGHT: 178 LBS | TEMPERATURE: 97.9 F | OXYGEN SATURATION: 97 % | HEART RATE: 85 BPM | SYSTOLIC BLOOD PRESSURE: 120 MMHG | DIASTOLIC BLOOD PRESSURE: 70 MMHG

## 2021-06-06 NOTE — TELEPHONE ENCOUNTER
Refill Approved    Rx renewed per Medication Renewal Policy. Medication was last renewed on 1/21/19.    Ramona Jon, [facility] Triage/Med Refill 3/2/2020     Requested Prescriptions   Pending Prescriptions Disp Refills     ONETOUCH VERIO strips [Pharmacy Med Name: ONE TOUCH VERIO TEST STRIP] 100 strip 4     Sig: USE 1 EACH AS DIRECTED DAILY. DISPENSE BRAND PER PATIENT'S INSURANCE AT PHARMACY DISCRETION.       Diabetic Supplies Refill Protocol Passed - 3/2/2020  1:35 AM        Passed - Visit with PCP or prescribing provider visit in last 6 months     Last office visit with prescriber/PCP: 12/5/2019 Clifton Mccray MD OR same dept: 12/5/2019 Clifton Mccray MD OR same specialty: 12/5/2019 Clifton Mccray MD  Last physical: Visit date not found Last MTM visit: Visit date not found   Next visit within 3 mo: Visit date not found  Next physical within 3 mo: Visit date not found  Prescriber OR PCP: Clifton Mccray MD  Last diagnosis associated with med order: 1. DM2 (diabetes mellitus, type 2) (H)  - ONETOUCH VERIO strips [Pharmacy Med Name: ONE TOUCH VERIO TEST STRIP]; USE 1 EACH AS DIRECTED DAILY. DISPENSE BRAND PER PATIENT'S INSURANCE AT PHARMACY DISCRETION.  Dispense: 100 strip; Refill: 4    If protocol passes may refill for 12 months if within 3 months of last provider visit (or a total of 15 months).             Passed - A1C in last 6 months     Hemoglobin A1c   Date Value Ref Range Status   12/05/2019 6.8 (H) 3.5 - 6.0 % Final

## 2021-06-09 NOTE — TELEPHONE ENCOUNTER
Refill Approved    Rx renewed per Medication Renewal Policy. Medication was last renewed on 7/25/2019.    Ramona Jon, [facility] Triage/Med Refill 7/10/2020     Requested Prescriptions   Pending Prescriptions Disp Refills     amLODIPine (NORVASC) 5 MG tablet [Pharmacy Med Name: AMLODIPINE BESYLATE 5 MG TAB] 90 tablet 3     Sig: TAKE 1 TABLET BY MOUTH EVERY DAY       Calcium-Channel Blockers Protocol Passed - 7/8/2020  1:14 AM        Passed - PCP or prescribing provider visit in past 12 months or next 3 months     Last office visit with prescriber/PCP: 12/5/2019 Clifton Mccray MD OR same dept: 12/5/2019 Clifton Mccray MD OR same specialty: 12/5/2019 Clifton Mccray MD  Last physical: Visit date not found Last MTM visit: Visit date not found   Next visit within 3 mo: 7/9/2020 Clifton Mccray MD  Next physical within 3 mo: Visit date not found  Prescriber OR PCP: Clifton Mccray MD  Last diagnosis associated with med order: 1. Benign essential hypertension  - amLODIPine (NORVASC) 5 MG tablet [Pharmacy Med Name: AMLODIPINE BESYLATE 5 MG TAB]; TAKE 1 TABLET BY MOUTH EVERY DAY  Dispense: 90 tablet; Refill: 3    If protocol passes may refill for 12 months if within 3 months of last provider visit (or a total of 15 months).             Passed - Blood pressure filed in past 12 months     BP Readings from Last 1 Encounters:   07/09/20 130/76

## 2021-06-09 NOTE — PROGRESS NOTES
Preoperative Exam    Scheduled Procedure: Right eye 7/23 Left eye 8/6 cataract and glaucoma adjustment  Surgery Date:  7/23/20  Surgery Location: [facility], [place], fax [phone]    Surgeon:  Dr. Gloria     Assessment/Plan:     1. Preop general physical exam  Work-up to include the following  - HM1(CBC and Differential)  - Potassium  - Electrocardiogram Perform - Clinic  - HM1 (CBC with Diff)    2. Age-related cataract of both eyes, unspecified age-related cataract type  Preoperative clearance has been granted    Surgical Procedure Risk: Low (reported cardiac risk generally < 1%)  Have you had prior anesthesia?: Yes  Have you or any family members had a previous anesthesia reaction:  No  Do you or any family members have a history of a clotting or bleeding disorder?: No  Cardiac Risk Assessment: no increased risk for major cardiac complications    APPROVAL GIVEN to proceed with proposed procedure, without further diagnostic evaluation        Functional Status: Independent  Patient plans to recover at home with family.     Subjective:      Tashi Cuellar is a 74 y.o. male who presents for a preoperative consultation.  Patient is a type II diabetic who has had excellent control and he presents with a increasing history of problems with nocturnal vision consistent with bilateral cataract disease.  This is been exam proven and patient is scheduled for phacoemulsification of these cataracts.  You will have initial surgery at present time and then repeat on the other eye in 2 weeks.  Risks and benefit of surgery have been explained to patient and he totally understands system review unremarkable.  Patient's been under good control and keeps his health maintenance requirements up-to-date.  All medical questions asked were answered related to the surgery patient has been therapeutically optimized for the anticipated surgery.  Today's EKG perfectly normal.  It was compared with 4 years ago and that was  normal will follow along postoperatively and primary care if necessary.  Once again patient has been approved for the anticipated    All other systems reviewed and are negative, other than those listed in the HPI.    Pertinent History  Do you have difficulty breathing or chest pain after walking up a flight of stairs: No  History of obstructive sleep apnea: No  Steroid use in the last 6 months: No  Frequent Aspirin/NSAID use: Yes: Aspirin 81 mg, Tylenol OTC 325mg x2/day for arthitis   Prior Blood Transfusion: No  Prior Blood Transfusion Reaction: No  If for some reason prior to, during or after the procedure, if it is medically indicated, would you be willing to have a blood transfusion?:  There is no transfusion refusal.    Current Outpatient Medications   Medication Sig Dispense Refill     albuterol (PROAIR HFA) 90 mcg/actuation inhaler Inhale 1 puff every 4 (four) hours as needed. Every 4-6 hours as needed. 1 Inhaler 0     amLODIPine (NORVASC) 5 MG tablet TAKE 1 TABLET BY MOUTH EVERY DAY 90 tablet 3     aspirin 81 MG EC tablet TAKE 1 TABLET (81 MG TOTAL) BY MOUTH DAILY. 90 tablet 1     betaxolol (BETOPTIC-S) 0.25 % ophthalmic suspension Administer 1 drop to both eyes 2 (two) times a day.        bimatoprost (LUMIGAN) 0.03 % ophthalmic drops Administer 1 drop to both eyes every evening.       blood glucose test (CONTOUR NEXT STRIPS) strips USE ONE STRIP TO CHECK GLUCOSE ONCE DAILY 100 strip 0     blood-glucose meter (CONTOUR NEXT EZ METER) Misc Test once daily 1 each 0     blood-glucose meter (ONETOUCH VERIO IQ METER) Misc Use 1 each As Directed as needed. 1 each 0     brimonidine (ALPHAGAN) 0.15 % ophthalmic solution Administer 1 drop to both eyes 3 (three) times a day.        calcium carbonate 300 mg (750 mg) Chew Chew 3 tablets 2 (two) times a day as needed.       CONTOUR NEXT LEV 2 CONTROL SOL Soln USE AS DIRECTED 1 each 0     dorzolamide (TRUSOPT) 2 % ophthalmic solution INSTILL 1 DROP INTO BOTH EYES 3 TIMES A  DAY       multivitamin therapeutic (THERAGRAN) tablet Take 1 tablet by mouth daily.       ONETOUCH ULTRA CONTROL Soln USE 1 EACH AS DIRECTED AS NEEDED. 1 each 2     ONETOUCH VERIO strips USE 1 EACH AS DIRECTED DAILY. DISPENSE BRAND PER PATIENT'S INSURANCE AT PHARMACY DISCRETION. 100 strip 4     tamsulosin (FLOMAX) 0.4 mg Cp24 Take 0.4 mg by mouth daily.  3     No current facility-administered medications for this visit.         No Known Allergies    Patient Active Problem List   Diagnosis     Asthma     Verruca Warts     DM2 (diabetes mellitus, type 2) (H)     Gout     Benign Essential Hypertension     Arteriosclerotic Cardiovascular Disease (ASCVD)     Unspecified glaucoma     Plantar Warts     Plantar Fasciitis     Cervical Disc Degeneration     Neck Pain     SIRS (systemic inflammatory response syndrome) (H)     Renal colic on right side     Lactic acid acidosis       Past Medical History:   Diagnosis Date     ASCVD (arteriosclerotic cardiovascular disease)      Asthma      Diabetes mellitus, type II (H)      Gallstones      Glaucoma      Gout      HTN (hypertension)      Hydronephrosis of right kidney        Past Surgical History:   Procedure Laterality Date     CYSTOSCOPY W/ URETERAL STENT PLACEMENT Right 3/27/2016    Procedure: CYSTOSCOPY, RIGHT URETERAL STENT PLACEMENT, RIGHT RETROGRADE PYLOGRAM;  Surgeon: Salvador Mina MD;  Location: [facility] [place];  Service:        Social History     Socioeconomic History     Marital status: Single     Spouse name: Not on file     Number of children: Not on file     Years of education: Not on file     Highest education level: Not on file   Occupational History     Not on file   Social Needs     Financial resource strain: Not on file     Food insecurity     Worry: Not on file     Inability: Not on file     Transportation needs     Medical: Not on file     Non-medical: Not on file   Tobacco Use     Smoking status: Never Smoker     Smokeless tobacco: Never Used    Substance and Sexual Activity     Alcohol use: No     Drug use: No     Sexual activity: Not on file   Lifestyle     Physical activity     Days per week: Not on file     Minutes per session: Not on file     Stress: Not on file   Relationships     Social connections     Talks on phone: Not on file     Gets together: Not on file     Attends [employer] service: Not on file     Active member of club or organization: Not on file     Attends meetings of clubs or organizations: Not on file     Relationship status: Not on file     Intimate partner violence     Fear of current or ex partner: Not on file     Emotionally abused: Not on file     Physically abused: Not on file     Forced sexual activity: Not on file   Other Topics Concern     Not on file   Social History Narrative     Not on file             Objective:     Vitals:    07/20/20 1339   BP: 110/76   Pulse: 83   SpO2: 94%   Weight: 179 lb 1.6 oz (81.2 kg)         Physical Exam:  General Appearance:  Alert, cooperative, no distress  Head:  Normocephalic, no obvious abnormality  Ears: TM anatomy normal  Eyes:  PERRL, EOM's intact, conjunctiva and corneas clear biateral cataracts  Nose:  Nares symmetrical, septum midline, mucosa pink, no sinus tenderness  Throat:  Lips, tongue, and mucosa are moist, pink, and intact  Neck:  Supple, symmetrical, trachea midline, no adenopathy; thyroid: no enlargement, symmetric,no tenderness/mass/nodules; no carotid bruit, no JVD  Back:  Symmetrical, no curvature, ROM normal, no CVA tenderness  Chest/Breast:  No mass or tenderness  Lungs:  Clear to auscultation bilaterally, respirations unlabored   Heart:  Normal PMI, regular rate & rhythm, S1 and S2 normal, no murmurs, rubs, or gallops  Abdomen:  Soft, non-tender, bowel sounds active all four quadrants, no mass, or organomegaly  Musculoskeletal:  Tone and strength strong and symmetrical, all extremities  Lymphatic:  No adenopathy  Skin/Hair/Nails:  Skin warm, dry, and intact, no  rashes  Neurologic:  Alert and oriented x3, no cranial nerve deficits, normal strength and tone, gait steady  Extremities:  No edema.  Sera's sign negative.    Genitourinary: deferred  Pulses:  Equal bilaterally    There are no Patient Instructions on file for this visit.    EKG: Normal EKG    Labs:  Labs pending at this time.  Results will be reviewed when available.    Immunization History   Administered Date(s) Administered     Influenza [id number], 01/12/2010     Influenza high dose,seasonal,PF, 65+ yrs 09/21/2012, 10/30/2019     Influenza, inj, historic,unspecified 10/01/2016, 09/01/2017, 11/01/2018     Pneumo Conj 13-V (2010&after) 10/24/2016     Pneumo Polysac 23-V 09/30/2014     Td,adult,historic,unspecified 09/30/2014     Tdap 09/30/2014     ZOSTER, LIVE 12/21/2011           Electronically signed by Clifton Mccray MD 07/20/20 1:41 PM

## 2021-06-09 NOTE — PROGRESS NOTES
Assessment:     1. Type 2 diabetes mellitus with hyperglycemia, without long-term current use of insulin (H)  Glycosylated Hemoglobin A1c    Creatinine, Random Urine    Microalbumin, Random Urine    Glycosylated Hemoglobin A1c   2. Mild intermittent asthma without complication     3. Renal colic on right side  albuterol (PROAIR HFA) 90 mcg/actuation inhaler       Plan:     1. Type 2 diabetes mellitus with hyperglycemia, without long-term current use of insulin (H)  Work-up to include the following patient's blood sugars have been running between 120 and 140  - Glycosylated Hemoglobin A1c  - Creatinine, Random Urine  - Microalbumin, Random Urine  - Glycosylated Hemoglobin A1c    2. Mild intermittent asthma without complication  I have renewed his albuterol inhaler    3. Renal colic on right side  Patient is currently asymptomatic  - albuterol (PROAIR HFA) 90 mcg/actuation inhaler; Inhale 1 puff every 4 (four) hours as needed. Every 4-6 hours as needed.  Dispense: 1 Inhaler; Refill: 0      Subjective:   I am seeing the patient here for follow-up of his type 2 diabetes mellitus; the patient has been very good about keeping his; blood sugar diary; patient is due for an A1c today and also is requesting a refill of his asthma medication.  He uses that very rarely.  He is scheduled for cataract surgery here in 2 weeks and we will defer that examination and that blood work until that time.  He denies any symptoms of peripheral vascular disease leg cramps shortness of breath gastroparesis.  Patient is complaining of pain in his hands and has purchased some over-the-counter cough and neck gel which is very appropriate for him to use.  We did stress the fact that he has to use it 3-4 times per day.  I will see the patient for follow-up 2 weeks and do hemogram EKG prior to his cataract surgery.  All medical questions asked were answered I have personally reviewed family social history surgeries allergies problems list.  I will  "send him a letter with regards to his A1c and his urine tests as he does not have a computer.    Review of Systems: A complete 14 point review of systems was obtained and is negative or as stated in the history of present illness.    Past Medical History:   Diagnosis Date     ASCVD (arteriosclerotic cardiovascular disease)      Asthma      Diabetes mellitus, type II (H)      Gallstones      Glaucoma      Gout      HTN (hypertension)      Hydronephrosis of right kidney      No family history on file.  Past Surgical History:   Procedure Laterality Date     CYSTOSCOPY W/ URETERAL STENT PLACEMENT Right 3/27/2016    Procedure: CYSTOSCOPY, RIGHT URETERAL STENT PLACEMENT, RIGHT RETROGRADE PYLOGRAM;  Surgeon: Salvador Mina MD;  Location: [facility];  Service:      Social History     Tobacco Use     Smoking status: Never Smoker     Smokeless tobacco: Never Used   Substance Use Topics     Alcohol use: No     Drug use: No         Objective:   /76 (Patient Site: Right Arm, Patient Position: Sitting, Cuff Size: Adult Regular)   Pulse 77   Ht 5' 6\" (1.676 m)   Wt 179 lb 11.2 oz (81.5 kg)   SpO2 98%   BMI 29.00 kg/m      General Appearance:  Alert, cooperative, no distress  Head:  Normocephalic, no obvious abnormality  Ears: TM anatomy normal  Eyes:  PERRL, EOM's intact, conjunctiva and corneas clear  Nose:  Nares symmetrical, septum midline, mucosa pink, no sinus tenderness  Throat:  Lips, tongue, and mucosa are moist, pink, and intact  Neck:  Supple, symmetrical, trachea midline, no adenopathy; thyroid: no enlargement, symmetric,no tenderness/mass/nodules; no carotid bruit, no JVD  Back:  Symmetrical, no curvature, ROM normal, no CVA tenderness  Chest/Breast:  No mass or tenderness  Lungs:  Clear to auscultation bilaterally, respirations unlabored   Heart:  Normal PMI, regular rate & rhythm, S1 and S2 normal, no murmurs, rubs, or gallops  Abdomen:  Soft, non-tender, bowel sounds active all four quadrants, " no mass, or organomegaly  Musculoskeletal:  Tone and strength strong and symmetrical, all extremities  Lymphatic:  No adenopathy  Skin/Hair/Nails:  Skin warm, dry, and intact, no rashes  Neurologic:  Alert and oriented x3, no cranial nerve deficits, normal strength and tone, gait steady  Extremities:  No edema.  Sera's sign negative.;  Monofilament test negative  Genitourinary: deferred  Pulses:  Equal bilaterally     I have had an Advance Directives discussion with the patient.      This note has been dictated using voice recognition software. Any grammatical or context distortions are unintentional and inherent to the the software.

## 2021-06-10 NOTE — TELEPHONE ENCOUNTER
Refill Approved    Rx renewed per Medication Renewal Policy. Medication was last renewed on 2/24/20.    Ramona Jon, [facility] [place] Connection Triage/Med Refill 8/19/2020     Requested Prescriptions   Pending Prescriptions Disp Refills     aspirin 81 MG EC tablet [Pharmacy Med Name: [facility] ASPIRIN EC 81 MG TABLET] 90 tablet 1     Sig: TAKE 1 TABLET BY MOUTH EVERY DAY       Aspirin/Dipyridamole Refill Protocol Passed - 8/18/2020 12:15 AM        Passed - PCP or prescribing provider visit in past 12 months       Last office visit with prescriber/PCP: Visit date not found OR same dept: 7/9/2020 Clifton Mccray MD OR same specialty: 7/9/2020 Clifton Mccray MD  Last physical: Visit date not found Last MTM visit: Visit date not found    Next appt within 3 mo: Visit date not found Next physical within 3 mo: Visit date not found  Prescriber OR PCP: Mercedes Toribio CNP  Last diagnosis associated with med order: 1. DM2 (diabetes mellitus, type 2) (H)  - aspirin 81 MG EC tablet [Pharmacy Med Name: [facility] ASPIRIN EC 81 MG TABLET]; TAKE 1 TABLET BY MOUTH EVERY DAY  Dispense: 90 tablet; Refill: 1    If protocol passes may refill for 6 months if within 3 months of last provider visit (or a total of 9 months).

## 2021-06-12 NOTE — PROGRESS NOTES
"ASSESSMENT: Left foot callus, right foot soft tissue mass    PLAN: Callus debrided down today, which felt better.  I do not see any speckled hyperpigmentation so I do not think it is a wart.  The small subdermal mass in the right foot is a minor nuisance, but not causing significant pain.  It might be a hemangioma or a plantar fibroma.  He is content to monitor this for now.  If it gets bigger or causes more pain, we might consider removing it.      SUBJECTIVE: New patient visit at the [facility] regarding a painful callus near the left fifth metatarsal base.  It used to be bigger, but part of it peeled off.  Less pain now.  He has not seen bleeding or drainage.  There is a lesion in the middle of his right foot which was frozen a few times through primary care.  It does not seem to hurt, but still is present.  Small callused lesion near the right fifth metatarsal head as well.  That has responded well to padding in his shoes.    PHYSICAL EXAM:  /78  Pulse 80  Resp 16  Ht 5' 6\" (1.676 m)  Wt 174 lb (78.9 kg)  BMI 28.08 kg/m2  General: Pleasant 71 y.o. male in no acute distress.  Vascular: DP pulses are palpable. PT pulses are palpable. Pedal hair is present. Feet are warm to the touch.  Cardiac: Pulse is regular.  Lymphatic: No edema at the ankles.  Neuro: Sensation in the feet is grossly intact to light touch.  Derm: Small callus near the left fifth metatarsal base was debrided down to intact skin.  Minimal callusing near the right fifth metatarsal head.  No debridement there today.  Musculoskeletal: Palpable subdermal mass in the right arch.  It seems to compress like a hemangioma minute.  No discoloration.    Past Medical History:   Diagnosis Date     ASCVD (arteriosclerotic cardiovascular disease)      Asthma      Diabetes mellitus, type II      Gallstones      Glaucoma      Gout      HTN (hypertension)      Hydronephrosis of right kidney         has a past surgical history that includes " Cystoscopy w/ ureteral stent placement (Right, 3/27/2016).    No Known Allergies    Current Outpatient Prescriptions   Medication Sig Dispense Refill     albuterol (PROAIR HFA) 90 mcg/actuation inhaler Inhale 1-2 puffs every 4 (four) hours as needed. Every 4-6 hours as needed. 1 Inhaler 2     amLODIPine (NORVASC) 5 MG tablet TAKE ONE TABLET BY MOUTH ONCE DAILY 90 tablet 3     betaxolol (BETOPTIC-S) 0.25 % ophthalmic suspension Administer 1 drop to both eyes 2 (two) times a day.        bimatoprost (LUMIGAN) 0.03 % ophthalmic drops Administer 1 drop to both eyes every evening.       blood-glucose meter (CONTOUR NEXT EZ METER) Misc Test once daily 1 each 0     brimonidine (ALPHAGAN) 0.15 % ophthalmic solution Administer 1 drop to both eyes 3 (three) times a day.        calcium carbonate 300 mg (750 mg) Chew Chew 3 tablets 2 (two) times a day as needed.       CONTOUR NEXT LEV 2 CONTROL SOL Soln USE AS DIRECTED 1 each 0     CONTOUR NEXT STRIPS strips USE ONE STRIP TO CHECK GLUCOSE ONCE DAILY 100 strip 0     multivitamin therapeutic (THERAGRAN) tablet Take 1 tablet by mouth daily.       No current facility-administered medications for this visit.        Family History:  family history is not on file.    Social History:  Reviewed, and he reports that he has never smoked. He has never used smokeless tobacco. He reports that he does not drink alcohol or use illicit drugs.    Review of Systems:  A 12 point comprehensive review of systems was negative except as noted.

## 2021-06-12 NOTE — PROGRESS NOTES
Assessment:     1. DM2 (diabetes mellitus, type 2)  Creatinine, Random Urine    Glycosylated Hemoglobin A1c    Microalbumin, Random Urine   2. Benign Essential Hypertension  Creatinine, Random Urine    Glycosylated Hemoglobin A1c    Microalbumin, Random Urine   3. Verruca vulgaris  Ambulatory referral to Podiatry       Plan:     1. DM2 (diabetes mellitus, type 2)  Patient's blood sugars have been running 111-118 so we anticipate an A1c better than his last reading of 7.0 I will alert the patient via letter  - Creatinine, Random Urine  - Glycosylated Hemoglobin A1c  - Microalbumin, Random Urine    2. Benign Essential Hypertension  Good control no change in medication patient is to continue amlodipine 5 mg  - Creatinine, Random Urine  - Glycosylated Hemoglobin A1c  - Microalbumin, Random Urine    3. Verruca vulgaris  Stubborn deep wart right lateral foot area referral to podiatry indicated  - Ambulatory referral to Podiatry      Subjective:   Simba is being seen for follow-up of his type 2 diabetes last A1c was 7.0 which was high for him is also in the care of urology for kidney stones 24 hour urine collection will be had in the next month or 2.  Patient is complaining of pain in his right foot this appears to be due to a stubborn deep verruca vulgaris the lateral aspect of his right foot we will refer him out to podiatry he also has some calluses which need to be looked at he denies any visual changes hearing loss dysphagia gastroparesis shortness of breath dyspnea chest pain angina abdominal pain change in weight diarrhea constipation urgency frequency I will see him for follow-up pending on his A1c perhaps 6 months all medical questions that were asked were answered I personally reviewed family and social history surgeries allergies problems list    Review of Systems: A complete 14 point review of systems was obtained and is negative or as stated in the history of present illness.    Past Medical History:  "  Diagnosis Date     ASCVD (arteriosclerotic cardiovascular disease)      Asthma      Diabetes mellitus, type II      Gallstones      Glaucoma      Gout      HTN (hypertension)      Hydronephrosis of right kidney      No family history on file.  Past Surgical History:   Procedure Laterality Date     CYSTOSCOPY W/ URETERAL STENT PLACEMENT Right 3/27/2016    Procedure: CYSTOSCOPY, RIGHT URETERAL STENT PLACEMENT, RIGHT RETROGRADE PYLOGRAM;  Surgeon: Salvador Mina MD;  Location: [facility] [place];  Service:      Social History   Substance Use Topics     Smoking status: Never Smoker     Smokeless tobacco: Never Used     Alcohol use No         Objective:   /82  Pulse 68  Ht 5' 6\" (1.676 m)  Wt 174 lb (78.9 kg)  SpO2 97%  BMI 28.08 kg/m2    General Appearance:  Alert, cooperative, no distress  Head:  Normocephalic, no obvious abnormality  Ears: TM anatomy normal  Eyes:  PERRL, EOM's intact, conjunctiva and corneas clear  Nose:  Nares symmetrical, septum midline, mucosa pink, no sinus tenderness  Throat:  Lips, tongue, and mucosa are moist, pink, and intact  Neck:  Supple, symmetrical, trachea midline, no adenopathy; thyroid: no enlargement, symmetric,no tenderness/mass/nodules; no carotid bruit, no JVD  Back:  Symmetrical, no curvature, ROM normal, no CVA tenderness  Chest/Breast:  No mass or tenderness  Lungs:  Clear to auscultation bilaterally, respirations unlabored   Heart:  Normal PMI, regular rate & rhythm, S1 and S2 normal, no murmurs, rubs, or gallops  Abdomen:  Soft, non-tender, bowel sounds active all four quadrants, no mass, or organomegaly  Musculoskeletal:  Tone and strength strong and symmetrical, all extremities  Lymphatic:  No adenopathy  Skin/Hair/Nails:  Skin warm, dry, and intact, no rashes patient has a large deep verruca vulgaris on the lateral aspect of his right foot also some calluses referral to podiatry will be made  Neurologic:  Alert and oriented x3, no cranial nerve deficits, " normal strength and tone, gait steady  Extremities:  No edema.  Sera's sign negative monofilament test was negative.    Genitourinary: deferred  Pulses:  Equal bilaterally     I have had an Advance Directives discussion with the patient.      This note has been dictated using voice recognition software. Any grammatical or context distortions are unintentional and inherent to the the software.

## 2021-06-13 NOTE — TELEPHONE ENCOUNTER
Refill Approved    Rx renewed per Medication Renewal Policy. Medication was last renewed on 7/9/20.    Ramona Jon, [facility] Connection Triage/Med Refill 11/20/2020     Requested Prescriptions   Pending Prescriptions Disp Refills     ONETOUCH ULTRA CONTROL Soln [Pharmacy Med Name: ONE TOUCH ULTRA CONTROL SOLN]  2     Sig: USE 1 EACH AS DIRECTED AS NEEDED.       Diabetic Supplies Refill Protocol Passed - 11/19/2020  2:01 PM        Passed - Visit with PCP or prescribing provider visit in last 6 months     Last office visit with prescriber/PCP: 7/9/2020 Clifton Mccray MD OR same dept: 7/9/2020 Clifton Mccray MD OR same specialty: 7/9/2020 Clifton Mccray MD  Last physical: 7/20/2020 Last MTM visit: Visit date not found   Next visit within 3 mo: Visit date not found  Next physical within 3 mo: Visit date not found  Prescriber OR PCP: Clifton Mccray MD  Last diagnosis associated with med order: 1. DM2 (diabetes mellitus, type 2) (H)  - ONETOUCH ULTRA CONTROL Soln [Pharmacy Med Name: ONE TOUCH ULTRA CONTROL SOLN]; USE 1 EACH AS DIRECTED AS NEEDED.; Refill: 2    If protocol passes may refill for 12 months if within 3 months of last provider visit (or a total of 15 months).             Passed - A1C in last 6 months     Hemoglobin A1c   Date Value Ref Range Status   07/09/2020 7.3 (H) 3.5 - 6.0 % Final

## 2021-06-14 NOTE — PROGRESS NOTES
Assessment/Plan:    1. Type 2 diabetes mellitus with hyperglycemia, without long-term current use of insulin (H)  Type 2 diabetes fair control.  Prior A1c 7.3% improved to 7.1% today with dietary measures primarily.  Encourage regular exercise as well as ongoing dietary modifications for goal A1c less than 7% ideally.  Anticipate reassessment at follow-up in 4 months.  Monofilament testing today was normal.  Has regular eye exams due to underlying glaucoma etc.  Repeat microalbumin screen with history of microalbuminuria.  Continues aspirin 81 mg daily.  - Glycosylated Hemoglobin A1c  - Basic Metabolic Panel  - lisinopriL (PRINIVIL,ZESTRIL) 10 MG tablet; Take 1 tablet (10 mg total) by mouth daily.  Dispense: 90 tablet; Refill: 3  - atorvastatin (LIPITOR) 20 MG tablet; Take 1 tablet (20 mg total) by mouth daily.  Dispense: 90 tablet; Refill: 3    2. Benign Essential Hypertension  Currently on amlodipine 5 mg daily with blood pressure 110/70.  Switch to lisinopril 10 mg daily due to microalbuminuria history.  Recheck blood pressure at follow-up annual wellness visit in 4 months.  - Basic Metabolic Panel    3. Microalbuminuria  History of microalbuminuria.  Switch to lisinopril 10 mg daily for renal protective benefits.  Repeat microalbumin screen completed today.  - lisinopriL (PRINIVIL,ZESTRIL) 10 MG tablet; Take 1 tablet (10 mg total) by mouth daily.  Dispense: 90 tablet; Refill: 3  - Microalbumin, Random Urine    4. Mild intermittent asthma without complication  Asthma control test 25 out of 25.    5. BPH with urinary obstruction  BPH with urinary obstruction improved with tamsulosin 0.4 mg daily.    6. Arteriosclerotic Cardiovascular Disease (ASCVD)  ASCVD history.  Repeat lipid cascade today, nonfasting.  Initiate atorvastatin 20 mg daily with underlying history of type 2 diabetes.  - Lipid Cascade  - atorvastatin (LIPITOR) 20 MG tablet; Take 1 tablet (20 mg total) by mouth daily.  Dispense: 90 tablet; Refill:  3    7. Need for hepatitis C screening test  Hepatitis C screen, low risk.  - Hepatitis C Antibody (Anti-HCV)    8. Plantar fascial fibromatosis of right foot  Right plantar fibroma noted.  Patient declines podiatry referral.  Has chosen just to not use arch support in right shoe.        Subjective:    Tashi Cuellar is seen today for follow-up evaluation.  Type 2 diabetes.  Prior A1c increasing from 6.8% up to 7.3% 2020.  Was told to increase exercise.  Dietary modifications.  Not getting out to walk as much since the malls have been closed.  Using amlodipine 5 mg daily for hypertension.  Continues daily aspirin 81 mg.  Glaucoma management with ophthalmologist.  BPH with urinary obstruction improved with tamsulosin 0.4 mg daily.  Intermittent asthma.  No recent concerns.  Prior microalbuminuria present with most recent level of 35.8.  Denies cough or shortness of breath.  Had used amoxicillin in December due to concerns with dental infection with described improvement in sinus frontal headaches.  Comprehensive review of systems as above otherwise all negative.    Single  No children  Tobacco:  never  EtOH:  infrequent  Mom -  69 Alzheimer's disease  Dad -  buried alive as part of construction accident  (patient was only 15)  2 older sis - one  and one in nursing home  Surgeries:  none  Hospitalizations:  kidney stone (possibly right side)    Retired:  worked for construction company  Hobbies:  nothing especially    Past Surgical History:   Procedure Laterality Date     CYSTOSCOPY W/ URETERAL STENT PLACEMENT Right 3/27/2016    Procedure: CYSTOSCOPY, RIGHT URETERAL STENT PLACEMENT, RIGHT RETROGRADE PYLOGRAM;  Surgeon: Salvador Mina MD;  Location: [facility] OR;  Service:         History reviewed. No pertinent family history.     Past Medical History:   Diagnosis Date     ASCVD (arteriosclerotic cardiovascular disease)      Asthma      Diabetes mellitus, type II (H)       Gallstones      Glaucoma      Gout      HTN (hypertension)      Hydronephrosis of right kidney         Social History     Tobacco Use     Smoking status: Never Smoker     Smokeless tobacco: Never Used   Substance Use Topics     Alcohol use: No     Drug use: No        Current Outpatient Medications   Medication Sig Dispense Refill     albuterol (PROAIR HFA) 90 mcg/actuation inhaler Inhale 1 puff every 4 (four) hours as needed. Every 4-6 hours as needed. 1 Inhaler 0     aspirin 81 MG EC tablet TAKE 1 TABLET BY MOUTH EVERY DAY 90 tablet 1     betaxolol (BETOPTIC-S) 0.25 % ophthalmic suspension Administer 1 drop to both eyes 2 (two) times a day.        bimatoprost (LUMIGAN) 0.03 % ophthalmic drops Administer 1 drop to both eyes every evening.       blood-glucose meter (ONETOUCH VERIO IQ METER) Misc Use 1 each As Directed as needed. 1 each 0     brimonidine (ALPHAGAN) 0.15 % ophthalmic solution Administer 1 drop to both eyes 3 (three) times a day.        calcium carbonate 300 mg (750 mg) Chew Chew 3 tablets 2 (two) times a day as needed.       CONTOUR NEXT LEV 2 CONTROL SOL Soln USE AS DIRECTED 1 each 0     dorzolamide (TRUSOPT) 2 % ophthalmic solution INSTILL 1 DROP INTO BOTH EYES 3 TIMES A DAY       ONETOUCH ULTRA CONTROL Soln USE 1 EACH AS DIRECTED AS NEEDED. 1 each 2     ONETOUCH VERIO strips USE 1 EACH AS DIRECTED DAILY. DISPENSE BRAND PER PATIENT'S INSURANCE AT PHARMACY DISCRETION. 100 strip 4     tamsulosin (FLOMAX) 0.4 mg Cp24 Take 0.4 mg by mouth daily.  3     atorvastatin (LIPITOR) 20 MG tablet Take 1 tablet (20 mg total) by mouth daily. 90 tablet 3     lisinopriL (PRINIVIL,ZESTRIL) 10 MG tablet Take 1 tablet (10 mg total) by mouth daily. 90 tablet 3     No current facility-administered medications for this visit.           Objective:    Vitals:    01/07/21 1023   BP: 110/70   Pulse: 80   Temp: 97.6  F (36.4  C)   SpO2: 97%   Weight: 176 lb (79.8 kg)      Body mass index is 28.41 kg/m .    Alert.  No  apparent distress.  Chest clear.  Cardiac exam regular.  Right foot plantar fibroma present.  Monofilament testing normal with good dorsalis pedis and posterior tibial pulses.      This note has been dictated using voice recognition software and as a result may contain minor grammatical errors and unintended word substitutions.

## 2021-06-15 NOTE — TELEPHONE ENCOUNTER
Refill Approved    Rx renewed per Medication Renewal Policy. Medication was last renewed on 08/19/2020.  Last office visit was 01/07/2021 with PCP office. Dr Derrick Geronimo.    Beatrice Cuba, [facility] Triage/Med Refill 2/12/2021     Requested Prescriptions   Pending Prescriptions Disp Refills     aspirin 81 MG EC tablet [Pharmacy Med Name: [facility] ASPIRIN EC 81 MG TABLET] 90 tablet 1     Sig: TAKE 1 TABLET BY MOUTH EVERY DAY       Aspirin/Dipyridamole Refill Protocol Passed - 2/11/2021  9:38 AM        Passed - PCP or prescribing provider visit in past 12 months       Last office visit with prescriber/PCP: Visit date not found OR same dept: 1/7/2021 Derrick Geronimo MD OR same specialty: 1/7/2021 Derrick Geronimo MD  Last physical: Visit date not found Last MTM visit: Visit date not found    Next appt within 3 mo: Visit date not found Next physical within 3 mo: Visit date not found  Prescriber OR PCP: Mercedes Toribio CNP  Last diagnosis associated with med order: 1. DM2 (diabetes mellitus, type 2) (H)  - aspirin 81 MG EC tablet [Pharmacy Med Name: [facility] ASPIRIN EC 81 MG TABLET]; TAKE 1 TABLET BY MOUTH EVERY DAY  Dispense: 90 tablet; Refill: 1    If protocol passes may refill for 6 months if within 3 months of last provider visit (or a total of 9 months).

## 2021-06-15 NOTE — PROGRESS NOTES
Assessment:     1. DM2 (diabetes mellitus, type 2)  Glycosylated Hemoglobin A1c    Lipid Cascade RANDOM    aspirin 81 MG EC tablet   2. Cervical Disc Degeneration         Plan:     1. DM2 (diabetes mellitus, type 2)  Patient needs to go to the mall to walk aerobically at least 4 times weekly for 1 mile during the cold weather; I will add a baby aspirin to his medication  - Glycosylated Hemoglobin A1c  - Lipid Cascade RANDOM  - aspirin 81 MG EC tablet; Take 1 tablet (81 mg total) by mouth daily.  Dispense: 150 tablet; Refill: 2    2. Cervical Disc Degeneration  Patient has fleeting intermittent neck pain which responds to heat; I feel aerobic exercise will help with his overall muscle tone to help with his discomfort      Subjective:   Patient is being seen for diabetes check patient has type 2 diabetes manages it with diet he is fairly good about it he has not been on medication.  The patient needs to add a baby aspirin to his regimen he has hypertensive he is on Norvasc 5 mg blood pressure under good control patient has no constitutional complaints no visual problems hearing problems dysphagia shortness of breath dyspnea chest pain angina abdominal pain diarrhea constipation urgency frequency dysuria plan is to increase his aerobic exercise we will check his A1c microalbumin today we will see him for follow-up 6 months at which time we will do full blood workup.  All medical questions that were asked were answered I personally reviewed family social history surgeries allergies problems list pleasure to see the patient again today immunizations were reviewed also and his medication was reviewed.    Review of Systems: A complete 14 point review of systems was obtained and is negative or as stated in the history of present illness.    Past Medical History:   Diagnosis Date     ASCVD (arteriosclerotic cardiovascular disease)      Asthma      Diabetes mellitus, type II      Gallstones      Glaucoma      Gout      HTN  "(hypertension)      Hydronephrosis of right kidney      No family history on file.  Past Surgical History:   Procedure Laterality Date     CYSTOSCOPY W/ URETERAL STENT PLACEMENT Right 3/27/2016    Procedure: CYSTOSCOPY, RIGHT URETERAL STENT PLACEMENT, RIGHT RETROGRADE PYLOGRAM;  Surgeon: Salvador Mina MD;  Location: [facility] [place];  Service:      Social History   Substance Use Topics     Smoking status: Never Smoker     Smokeless tobacco: Never Used     Alcohol use No         Objective:   /70  Pulse 80  Ht 5' 7.5\" (1.715 m)  Wt 175 lb 11.2 oz (79.7 kg)  BMI 27.11 kg/m2    General Appearance:  Alert, cooperative, no distress  Head:  Normocephalic, no obvious abnormality  Ears: TM anatomy normal  Eyes:  PERRL, EOM's intact, conjunctiva and corneas clear  Nose:  Nares symmetrical, septum midline, mucosa pink, no sinus tenderness  Throat:  Lips, tongue, and mucosa are moist, pink, and intact  Neck:  Supple, symmetrical, trachea midline, no adenopathy; thyroid: no enlargement, symmetric,no tenderness/mass/nodules; no carotid bruit, no JVD  Back:  Symmetrical, no curvature, ROM normal, no CVA tenderness  Chest/Breast:  No mass or tenderness  Lungs:  Clear to auscultation bilaterally, respirations unlabored   Heart:  Normal PMI, regular rate & rhythm, S1 and S2 normal, no murmurs, rubs, or gallops  Abdomen:  Soft, non-tender, bowel sounds active all four quadrants, no mass, or organomegaly  Musculoskeletal:  Tone and strength strong and symmetrical, all extremities  Lymphatic:  No adenopathy  Skin/Hair/Nails:  Skin warm, dry, and intact, no rashes  Neurologic:  Alert and oriented x3, no cranial nerve deficits, normal strength and tone, gait steady  Extremities:  No edema.  Sera's sign negative.    Genitourinary: deferred  Pulses:  Equal bilaterally     I have had an Advance Directives discussion with the patient.      This note has been dictated using voice recognition software. Any grammatical or " context distortions are unintentional and inherent to the the software.

## 2021-06-15 NOTE — PROGRESS NOTES
Assessment/Plan:    1. Tension headache  Tizanidine 2 mg use half tablet to 1 tablet every 6 hours as needed for tension type headaches with likely cervical etiology initially.  No evidence for bacterial sinusitis etc. at this time.  Tylenol Extra Strength 1000 mg 3 times daily as needed.  - tiZANidine (ZANAFLEX) 2 MG tablet; Take 0.5-1 tablets (1-2 mg total) by mouth every 6 (six) hours as needed.  Dispense: 30 tablet; Refill: 0    2. Neck pain  Neck pain right posterior lateral neck into trapezius.  Tizanidine should be beneficial.  Do not use with alcohol.  Do not operate heavy machinery etc.  Sedative side effects likely and will monitor closely to prevent falls risk etc.  - tiZANidine (ZANAFLEX) 2 MG tablet; Take 0.5-1 tablets (1-2 mg total) by mouth every 6 (six) hours as needed.  Dispense: 30 tablet; Refill: 0    3. Benign Essential Hypertension  Hypertension remains well controlled currently with known history of underlying type 2 diabetes.  Amlodipine 5 mg daily previously however switched to lisinopril 10 mg daily on 1/7/21 due to microalbuminuria history and will reassess at diabetic follow-up May 7, 2021.          Subjective:    Tashi Cuellar is seen today for headache.  Ongoing over the past week or 2.  Posterior neck pain perhaps more on the right side initially.  Frontal headache more on the right above eyebrows.  No vision change however has had long standing issues with cataract and glaucoma concerns with subsequent cataract surgery etc. as noted.  Continues to follow with ophthalmologist.  Has not had chronic neck concerns.  Does have underlying type 2 diabetes and hypertension.  Microalbuminuria.  Previously switched from amlodipine to lisinopril for renal protective benefits.  Does have diabetes follow-up in May.  Most recent A1c of 7.1% on January 7, 2021 while continuing dietary and exercise modifications in order to manage.  Comprehensive review of systems as above otherwise all  negative.    Single  No children  Tobacco: never  EtOH: infrequent  Mom -  69 Alzheimer's disease  Dad -  buried alive as part of construction accident 195 (patient was only 15)  2 older sis - one  and one in nursing home  Surgeries: none  Hospitalizations: kidney stone (possibly right side)   Retired: worked for [employer]  Hobbies: nothing especially      Past Surgical History:   Procedure Laterality Date     CYSTOSCOPY W/ URETERAL STENT PLACEMENT Right 3/27/2016    Procedure: CYSTOSCOPY, RIGHT URETERAL STENT PLACEMENT, RIGHT RETROGRADE PYLOGRAM;  Surgeon: Salvador Mina MD;  Location: [facility] - [place];  Service:         No family history on file.     Past Medical History:   Diagnosis Date     ASCVD (arteriosclerotic cardiovascular disease)      Asthma      Diabetes mellitus, type II (H)      Gallstones      Glaucoma      Gout      HTN (hypertension)      Hydronephrosis of right kidney         Social History     Tobacco Use     Smoking status: Never Smoker     Smokeless tobacco: Never Used   Substance Use Topics     Alcohol use: No     Drug use: No        Current Outpatient Medications   Medication Sig Dispense Refill     albuterol (PROAIR HFA) 90 mcg/actuation inhaler Inhale 1 puff every 4 (four) hours as needed. Every 4-6 hours as needed. 1 Inhaler 0     aspirin 81 MG EC tablet TAKE 1 TABLET BY MOUTH EVERY DAY 90 tablet 1     atorvastatin (LIPITOR) 20 MG tablet Take 1 tablet (20 mg total) by mouth daily. 90 tablet 3     betaxolol (BETOPTIC-S) 0.25 % ophthalmic suspension Administer 1 drop to both eyes 2 (two) times a day.        bimatoprost (LUMIGAN) 0.03 % ophthalmic drops Administer 1 drop to both eyes every evening.       blood-glucose meter (ONETOUCH VERIO IQ METER) Misc Use 1 each As Directed as needed. 1 each 0     brimonidine (ALPHAGAN) 0.15 % ophthalmic solution Administer 1 drop to both eyes 3 (three) times a day.        calcium carbonate 300 mg (750 mg) Chew Chew  "3 tablets 2 (two) times a day as needed.       dorzolamide (TRUSOPT) 2 % ophthalmic solution INSTILL 1 DROP INTO BOTH EYES 3 TIMES A DAY       lisinopriL (PRINIVIL,ZESTRIL) 10 MG tablet Take 1 tablet (10 mg total) by mouth daily. 90 tablet 3     ONETOUCH ULTRA CONTROL Soln USE 1 EACH AS DIRECTED AS NEEDED. 1 each 2     ONETOUCH VERIO strips USE 1 EACH AS DIRECTED DAILY. DISPENSE BRAND PER PATIENT'S INSURANCE AT PHARMACY DISCRETION. 100 strip 4     tamsulosin (FLOMAX) 0.4 mg Cp24 Take 0.4 mg by mouth daily.  3     CONTOUR NEXT LEV 2 CONTROL SOL Soln USE AS DIRECTED 1 each 0     tiZANidine (ZANAFLEX) 2 MG tablet Take 0.5-1 tablets (1-2 mg total) by mouth every 6 (six) hours as needed. 30 tablet 0     No current facility-administered medications for this visit.           Objective:    Vitals:    03/10/21 1316   BP: 120/70   Pulse: 85   Temp: 97.9  F (36.6  C)   SpO2: 97%   Weight: 178 lb (80.7 kg)      Body mass index is 28.73 kg/m .    Alert.  No apparent distress.  Right posterior lateral neck tenderness mild without significant asymmetry on neck exam.  Does have left postauricular soft tissue swelling described as prior \"fatty tumor\" that had been excised in distant past.  Nontender nonfluctuant lesion present currently consistent with lipoma.  Status post cataract repair bilateral noted.  Nasopharynx and oropharynx appear normal.  Chest clear.  Cardiac exam regular.      This note has been dictated using voice recognition software and as a result may contain minor grammatical errors and unintended word substitutions.   "

## 2021-06-15 NOTE — TELEPHONE ENCOUNTER
Tashi calls in to report that he has had a headache for about one week. He has tried some home care measures like tylenol and cold to help get rid of it and they have not helped. He denies sinus pressure or congestion.  He would like to be seen in the clinic.  Sent to scheduling.     Reason for Disposition    Patient wants to be seen    Additional Information    Negative: Followed a head injury within last 3 days    Negative: Sinus pain of forehead and yellow or green nasal discharge    Negative: Pregnant    Negative: Traumatic Brain Injury (TBI) is suspected    Negative: Unable to walk without falling    Negative: Stiff neck (can't touch chin to chest)    Negative: Possibility of carbon monoxide exposure    Negative: Severe pain in one eye    Negative: SEVERE headache, states 'worst headache' of life    Negative: SEVERE headache, sudden onset (i.e., reaching maximum intensity within 30 seconds)    Negative: Loss of vision or double vision    Negative: Patient sounds very sick or weak to the triager    Negative: Fever > 103 F (39.4 C)    Negative: Fever > 100.0 F (37.8 C) and has diabetes mellitus or a weak immune system (e.g., HIV positive, cancer chemotherapy, organ transplant, splenectomy, chronic steroids)    Negative: SEVERE headache and vomiting    Negative: SEVERE headache and fever    Negative: Fever present > 3 days (72 hours)    Negative: New headache and weak immune system (e.g., HIV positive, cancer chemotherapy, chronic steroid treatment)    Negative: SEVERE headache (e.g., excruciating) and has had severe headaches before    Negative: SEVERE headache and not relieved by pain meds    Protocols used: HEADACHE-A-OH

## 2021-06-16 NOTE — TELEPHONE ENCOUNTER
Reason contacted:  Symptom  Information relayed:    Spoke with patient who saw Dr. Geronimo on 3/10/2021 for a headache and neck pain.    He reports taking the tylenol and tizanidine as prescribed without relief.   Symptoms are not any better or worse since being seen on 3/10/2021.    Per 3/10/2021 OV note:  1. Tension headache  Tizanidine 2 mg use half tablet to 1 tablet every 6 hours as needed for tension type headaches with likely cervical etiology initially.  No evidence for bacterial sinusitis etc. at this time.  Tylenol Extra Strength 1000 mg 3 times daily as needed.  - tiZANidine (ZANAFLEX) 2 MG tablet; Take 0.5-1 tablets (1-2 mg total) by mouth every 6 (six) hours as needed.  Dispense: 30 tablet; Refill: 0     2. Neck pain  Neck pain right posterior lateral neck into trapezius.  Tizanidine should be beneficial.  Do not use with alcohol.  Do not operate heavy machinery etc.  Sedative side effects likely and will monitor closely to prevent falls risk etc.  - tiZANidine (ZANAFLEX) 2 MG tablet; Take 0.5-1 tablets (1-2 mg total) by mouth every 6 (six) hours as needed.  Dispense: 30 tablet; Refill: 0     What else can patient do or take for his headache and neck pain?     Please advise   Additional questions:  No  Further follow-up needed:  Yes  Okay to leave a detailed message:  No

## 2021-06-16 NOTE — TELEPHONE ENCOUNTER
Reason for call:  Patient reporting a symptom    Symptom or request: Head pain/ Headache     Duration (how long have symptoms been present): 3/10/21 he was seen regarding these same symptoms     Have you been treated for this before? Yes    Additional comments: Patient is wondering what else he can do to help with the pain because the tyneol and muscle relaxer isn't working. Please advise patient     Phone Number patient can be reached at:  Home number on file [phone] (home)    Best Time:  Anytime     Can we leave a detailed message on this number: Yes    Call taken on 4/19/2021 at 8:36 AM by Nanette Ramos

## 2021-06-16 NOTE — TELEPHONE ENCOUNTER
Please call patient for update.  Would he be interested in trial of physical therapy for neck pain with secondary (tension-type) headache?  I will place an order for P.T. assessment.  Please have him follow-up in office for further diagnostic exam and imaging in order to continue to evaluate for underlying cause if ongoing concerns or if worsening.

## 2021-06-16 NOTE — PROGRESS NOTES
"Tahsi Cuellar is a 75 y.o. male who is being evaluated via a billable telephone visit.      What phone number would you like to be contacted at? [phone]  How would you like to obtain your AVS? AVS Preference: Mail a copy.    Assessment & Plan     Neck Pain  Patient will take a 625 Tylenol with the following medication every 12 hours he will discontinue his muscle relaxer which seems to make him drowsy  - naproxen (NAPROSYN) 500 MG tablet  Dispense: 60 tablet; Refill: 1    Cervical Disc Degeneration  See above note; the patient is to keep his appointment with Dr. Geronimo here in May for follow-up  - naproxen (NAPROSYN) 500 MG tablet  Dispense: 60 tablet; Refill: 1               BMI:   Estimated body mass index is 28.89 kg/m  as calculated from the following:    Height as of 7/9/20: 5' 6\" (1.676 m).    Weight as of 3/19/21: 179 lb (81.2 kg).       No follow-ups on file.    Clifton Mccray MD  [facility]    Subjective   Tashi Cuellar is 75 y.o. and presents today for the following health issues   HPI is a telephone visit conducted between the patient and myself he is having ongoing neck problems and pain which begins at the base of his neck seems to go up his neck between his ears and then gradually over the back of his head it seems to be movement related.  Patient has been taking her 1000 mg of Tylenol every 8 hours without any relief.  He also has been on Zanaflex with not total relief and actually is getting little sedated from it.  We are not aware of the fact the patient does have cervical disc disease osteoarthritis and radicular pain as a history.  I would like to try him on combination of naproxen and Tylenol as outlined in the plan above for 30 days prior to his keeping his follow-up appointment with Dr. Derrick Geronimo next month.  We will discontinue the Zanaflex at this time patient may well need MRI of cervical spine and referral for physical therapy to spine clinic or " neurosurgical consultation            Review of Systems  Otherwise patient is doing well considering his diabetes mellitus      Objective       Vitals:  No vitals were obtained today due to virtual visit.    No physical examination was conducted          Phone call duration: 11 minutes

## 2021-06-16 NOTE — PROGRESS NOTES
Assessment/Plan:    1. Tension headache  Tension headaches likely associated with cervical neck strain.  Tizanidine 2 mg may use 1 or 2 tablets at bedtime.  Tylenol Extra Strength 2 tablets in the morning.  No other warning signs identified with current headaches which can be on either side often frontal or base of neck.  - tiZANidine (ZANAFLEX) 2 MG tablet; Take 1-2 tablets (2-4 mg total) by mouth every 6 (six) hours as needed.  Dispense: 60 tablet; Refill: 1    2. Neck pain  Demonstrates very good neck range of motion.  Cervical x-rays without significant osteoarthritis.  Tizanidine reviewed for 4 mg dosing at bedtime trial with Tylenol Extra Strength using 2 tablets in the morning.  Patient defers physical therapy at this time.  - XR Cervical Spine 2 - 3 VWS; Future  - tiZANidine (ZANAFLEX) 2 MG tablet; Take 1-2 tablets (2-4 mg total) by mouth every 6 (six) hours as needed.  Dispense: 60 tablet; Refill: 1    3. Benign Essential Hypertension  Hypertension appears stable.    4. Type 2 diabetes mellitus with hyperglycemia, without long-term current use of insulin (H)  Diabetes recheck May 7, 2021 reviewed.        Subjective:    Tashi Cuellar is seen today for ongoing headache and neck pain.  More right posterior lateral neck.  Has a my pillow pillow.  Has had for about 10 years.  Often when his neck is sore will sleep on the couch with his head propped up which seems to help.  Headaches variable can be left or right side often frontal but can be base of neck as well.  Right posterolateral neck discomfort.  Has tried tizanidine 2 mg at bedtime which does not seem to cause too much sedation.  Tylenol Extra Strength can be helpful as well.  Had fallen perhaps 4 months ago landing on his backside however no head injury or loss of consciousness.  Cousin of his had an aneurysm.  Patient has not had any vision change.  Had YAG procedure for both eyes in February.  No history of glaucoma.  Comprehensive review of  systems as above otherwise all negative.    Single   No children   Tobacco:  never   EtOH:  infrequent   Mom -  69 Alzheimer's disease   Dad -  buried alive as part of construction accident [birth year] (patient was only 15)   2 older sis - one  and one in nursing home   Surgeries:  none   Hospitalizations:  kidney stone (possibly right side)     Retired:  worked for construction company   Hobbies:  nothing especially    Past Surgical History:   Procedure Laterality Date     CYSTOSCOPY W/ URETERAL STENT PLACEMENT Right 3/27/2016    Procedure: CYSTOSCOPY, RIGHT URETERAL STENT PLACEMENT, RIGHT RETROGRADE PYLOGRAM;  Surgeon: Salvador Mina MD;  Location: [facility];  Service:         No family history on file.     Past Medical History:   Diagnosis Date     ASCVD (arteriosclerotic cardiovascular disease)      Asthma      Diabetes mellitus, type II (H)      Gallstones      Glaucoma      Gout      HTN (hypertension)      Hydronephrosis of right kidney         Social History     Tobacco Use     Smoking status: Never Smoker     Smokeless tobacco: Never Used   Substance Use Topics     Alcohol use: No     Drug use: No        Current Outpatient Medications   Medication Sig Dispense Refill     albuterol (PROAIR HFA) 90 mcg/actuation inhaler Inhale 1 puff every 4 (four) hours as needed. Every 4-6 hours as needed. 1 Inhaler 0     aspirin 81 MG EC tablet TAKE 1 TABLET BY MOUTH EVERY DAY 90 tablet 1     atorvastatin (LIPITOR) 20 MG tablet Take 1 tablet (20 mg total) by mouth daily. 90 tablet 3     betaxolol (BETOPTIC-S) 0.25 % ophthalmic suspension Administer 1 drop to both eyes 2 (two) times a day.        bimatoprost (LUMIGAN) 0.03 % ophthalmic drops Administer 1 drop to both eyes every evening.       blood-glucose meter (ONETOUCH VERIO IQ METER) Misc Use 1 each As Directed as needed. 1 each 0     brimonidine (ALPHAGAN) 0.15 % ophthalmic solution Administer 1 drop to both eyes 3 (three) times a day.         calcium carbonate 300 mg (750 mg) Chew Chew 3 tablets 2 (two) times a day as needed.       dorzolamide (TRUSOPT) 2 % ophthalmic solution INSTILL 1 DROP INTO BOTH EYES 3 TIMES A DAY       lisinopriL (PRINIVIL,ZESTRIL) 10 MG tablet Take 1 tablet (10 mg total) by mouth daily. 90 tablet 3     ONETOUCH ULTRA CONTROL Soln USE 1 EACH AS DIRECTED AS NEEDED. 1 each 2     ONETOUCH VERIO strips USE 1 EACH AS DIRECTED DAILY. DISPENSE BRAND PER PATIENT'S INSURANCE AT PHARMACY DISCRETION. 100 strip 4     tamsulosin (FLOMAX) 0.4 mg Cp24 Take 0.4 mg by mouth daily.  3     tiZANidine (ZANAFLEX) 2 MG tablet Take 1-2 tablets (2-4 mg total) by mouth every 6 (six) hours as needed. 60 tablet 1     No current facility-administered medications for this visit.           Objective:    Vitals:    03/19/21 0849   BP: 140/80   Pulse: 69   Temp: 97.7  F (36.5  C)   SpO2: 98%   Weight: 179 lb (81.2 kg)      Body mass index is 28.89 kg/m .    Alert.  No apparent distress.  Negative Romberg.  Cranial nerves intact.  No pronator drift.  DTRs hyperreflexic symmetric upper and lower extremities.      This note has been dictated using voice recognition software and as a result may contain minor grammatical errors and unintended word substitutions.

## 2021-06-16 NOTE — TELEPHONE ENCOUNTER
Called and notified patient of the below message. He is interested in following up with Dr. Geronimo.  Appointment arranged for 8:40 am this morning.

## 2021-06-17 NOTE — TELEPHONE ENCOUNTER
----- Message from Marga Brown PA-C sent at 5/24/2021 12:38 PM CDT -----  Please call the patient and let him know that I reviewed the MRI scans of his brain and c-spine.  The MRI brain did not show any acute findings to cause his headache.  The MRI c-spine shows mild wear and tear type  Changes.  There is a moderate degree of narrowing around a nerve as it exits out of the spine on the right, but it does not fit with the pattern of pain he described involving the right thumb, so uncertain significance.  I recommend a referral to neurology for further evaluation of his headaches.  For the neck pain, I recommend PT and follow up with me in four weeks.  I am happy to see him sooner if he prefers.

## 2021-06-17 NOTE — PROGRESS NOTES
ASSESSMENT: Tashi Cuellar is a 75 y.o. male with past medical history significant for asthma, type 2 diabetes mellitus, gout, hypertension, cardiovascular disease who presents today for new patient evaluation of chronic neck pain and headaches.  My review of an x-ray cervical spine is normal.  No significant degenerative change.  Unclear etiology for patient's symptoms.  He does describe intermittent right upper extremity pain in a C6 pattern.  Would like to evaluate for possible right C6 impingement.  Headaches are not typical for cervicogenic headaches.  They are more typically frontal and temporal rather than occipital.  He does describe some concerning symptoms including increased headache pain when laying down.  Will get MRI to further evaluate.    NDI: 12    PLAN:  A shared decision making model was used.  The patient's values and choices were respected.  The following represents what was discussed and decided upon by the physician assistant and the patient.      1.  DIAGNOSTIC TESTS: I reviewed the x-ray cervical spine.  I ordered an MRI cervical spine and an MRI brain with and without contrast to evaluate further.    2.  PHYSICAL THERAPY: No physical therapy was ordered.  We will await the results of the MRI.    3.  MEDICATIONS: No changes are made to the patient's medications.  As she can continue the Tylenol as needed.  -Naproxen caused stomach irritation.  -Tizanidine caused too much sedation.    4.  INTERVENTIONS: No interventions were ordered.    5.  PATIENT EDUCATION: Patient is in agreement the above plan.  All questions were answered.    6.  FOLLOW-UP:   A nurse will call the patient with the results of his MRI scans.  If he has questions or concerns in the meantime, he should not hesitate to call.        SUBJECTIVE:  Tashi Cuellar  Is a 75 y.o. male who presents today in consultation at the request of Dr. South for new patient evaluation of neck pain and headaches.  Patient reports that  he is most concerned about his headaches.  He states that he has had headaches for 2 to 3 months.  He thinks they are related to his neck because he also has some neck pain, but he states the headache pain is worse than the neck pain.  He is somewhat of a difficult historian.  He denies any specific injury or event to cause the symptoms to begin 2 to 3 months ago.  He did have eye surgery around that time but he states his eye doctor told him headaches would not be related to that.  He states that headaches are unusual for him.  He does not typically experience headaches.  The only other time he can return for headaches is about 20 years ago when he was still working.  Headache resolved with aspirin.    Patient complains of daily headache.  He states the pain typically involves the forehead and temporal area, although it can switch sides.  Sometimes the pain feel like it in his eyes.  He also occasionally feels pain in the occipital region, but that is less frequent.  He describes the headache pain as a gnawing pain.  The pain is worse at night when he is laying down and in the morning when he wakes up.  He states that when headache pain is severe he does not even want to lay down to go to sleep because he knows the pain will get worse.  The headache pain typically gets better after he has been up out of bed for 2 to 3 hours.  He states that the headache is present to some degree every day, although there are good days and bad days.  Today for example he only rates his pain as a 1 out of 10.  Pain is located in the right frontotemporal region today.  At its worst pain is a 7 out of 10.  He states that when the headache pain is severe it is debilitating.  He denies any nausea or vomiting.  He does describe some photophobia but he thinks it is related to recent eye surgery.  Denies phonophobia.  Denies numbness, tingling, weakness in the arms or legs.  Denies dizziness or balance changes.  He has not had any falls.   Denies changes in memory.  He states that he always has a poor memory.  He denies any loss of bowel or bladder control.  He has been constipated.    Patient also has some neck pain.  Pain is typically worse on the right than the left.  He points to the upper cervical region when asked to point to the pain.  He states that he hears a grinding sensation when he turns his neck.  He also has pain in the right thumb which radiates up to the arm to the neck.  He thinks that is probably related to arthritis.    Patient has not had any treatments for this.  He has not had physical therapy.  He does not go to a chiropractor.  He is never had any spine surgeries or spine injections.  Patient tried using naproxen but this cause significant stomach irritation so he stopped taking it.  Tizanidine was not helpful and caused drowsiness.  He is taking Tylenol 650 mg as needed.  He is not sure if this is helping.  He stopped taking the medications yesterday.    Current Outpatient Medications on File Prior to Visit   Medication Sig Dispense Refill     albuterol (PROAIR HFA) 90 mcg/actuation inhaler Inhale 1 puff every 4 (four) hours as needed. Every 4-6 hours as needed. 1 Inhaler 2     aspirin 81 MG EC tablet TAKE 1 TABLET BY MOUTH EVERY DAY 90 tablet 1     atorvastatin (LIPITOR) 20 MG tablet Take 1 tablet (20 mg total) by mouth daily. 90 tablet 3     betaxolol (BETOPTIC-S) 0.25 % ophthalmic suspension Administer 1 drop to both eyes 2 (two) times a day.        bimatoprost (LUMIGAN) 0.03 % ophthalmic drops Administer 1 drop to both eyes every evening.       blood-glucose meter (ONETOUCH VERIO IQ METER) Misc Use 1 each As Directed as needed. 1 each 0     brimonidine (ALPHAGAN) 0.15 % ophthalmic solution Administer 1 drop to both eyes 3 (three) times a day.        calcium carbonate 300 mg (750 mg) Chew Chew 3 tablets 2 (two) times a day as needed.       dorzolamide (TRUSOPT) 2 % ophthalmic solution INSTILL 1 DROP INTO BOTH EYES 3 TIMES  A DAY       losartan (COZAAR) 50 MG tablet Take 1 tablet (50 mg total) by mouth daily. 90 tablet 3     naproxen (NAPROSYN) 500 MG tablet Take 1 tablet (500 mg total) by mouth 2 (two) times a day with meals. 60 tablet 1     ONETOUCH ULTRA CONTROL Soln USE 1 EACH AS DIRECTED AS NEEDED. 1 each 2     ONETOUCH VERIO TEST STRIPS strips TEST DAILY 100 strip 2     tamsulosin (FLOMAX) 0.4 mg Cp24 Take 0.4 mg by mouth daily.  3         No Known Allergies    Past Medical History:   Diagnosis Date     ASCVD (arteriosclerotic cardiovascular disease)      Asthma      Diabetes mellitus, type II (H)      Gallstones      Glaucoma      Gout      HTN (hypertension)      Hydronephrosis of right kidney         Patient Active Problem List   Diagnosis     Asthma     Verruca Warts     DM2 (diabetes mellitus, type 2) (H)     Gout     Benign Essential Hypertension     Arteriosclerotic Cardiovascular Disease (ASCVD)     Unspecified glaucoma     Plantar Warts     Plantar fascial fibromatosis of right foot     Cervical Disc Degeneration     Neck Pain     SIRS (systemic inflammatory response syndrome) (H)     Renal colic on right side     Lactic acid acidosis       Past Surgical History:   Procedure Laterality Date     CYSTOSCOPY W/ URETERAL STENT PLACEMENT Right 3/27/2016    Procedure: CYSTOSCOPY, RIGHT URETERAL STENT PLACEMENT, RIGHT RETROGRADE PYLOGRAM;  Surgeon: Salvador Mina MD;  Location: [facility] [place];  Service:        Family history: Mother and sister had diabetes    Social history: Patient is single.  He is retired.  He denies tobacco, alcohol, or illicit drug use.      ROS: Positive for weight gain, headache, ringing in ears.  Specifically negative for dysphagia, imbalance, fine motor skill difficulties, bowel/bladder dysfunction, fevers,chills, appetite changes, unexplained weight loss.   Otherwise 13 systems reviewed are negative.  Please see the patient's intake questionnaire from today for  details.      OBJECTIVE:  PHYSICAL EXAMINATION:  CONSTITUTIONAL:  Vital signs as above.  No acute distress.  The patient is well nourished and well groomed.  PSYCHIATRIC:  The patient is awake, alert, oriented to person, place, time and answering questions appropriately with clear speech.    HEENT:  Normocephalic, atraumatic.  Sclera clear.    SKIN:  Skin over the face, bilateral upper extremities, and neck is clean, dry, intact without rashes.  LYMPH NODES:  No palpable or tender anterior/posterior cervical, submandibular, or supraclavicular lymph nodes.    MUSCLE STRENGTH:  5/5 strength for the bilateral shoulder abductors, elbow flexors/extensors, wrist extensors, finger flexors/abductors.  NEURO:  CN III-XII are grossly intact.  1-2+ symmetric biceps, brachioradialis, triceps reflexes bilaterally.  Sensation to light touch intact bilateral upper extremities.   Negative Williamson's bilaterally.    VASCULAR:  2/4 radial pulses bilaterally.  Warm upper limbs bilaterally.  Capillary refill in the upper extremities is less than 1 second.  MUSCULOSKELETAL: No significant tenderness palpation bilateral cervical paraspinous muscles or upper trapezius muscles.  No significant tenderness palpation bilateral occipital nerves.  Cervical range of motion is within normal limits.    RESULTS: I reviewed the x-ray cervical spine from [facility] dated March 19, 2021.  This shows normal alignment.  No significant degenerative change.

## 2021-06-17 NOTE — PROGRESS NOTES
Assessment and Plan:     Patient has been advised of split billing requirements and indicates understanding: No     1. Routine general medical examination at a health care facility  Annual wellness visit completed.  Risks associated with described fair health, suboptimal diet, urine leakage and need for healthcare directive completion.  Patient has been hesitant to complete colon cancer screening etc. because of concerns of what they might find.  We have discussed Cologuard completion and patient will consider.  Annual wellness visits to continue.    2. Type 2 diabetes mellitus with hyperglycemia, without long-term current use of insulin (H)  Type 2 diabetes diet controlled currently.  Suboptimal diet.  Understands need for more consistent exercise.  A1c relatively stable with 7.1% January 7, 2021 increasing slightly to 7.2% today with goal remaining less than 7% ideally.  Reassess at follow-up in 4 months.  Repeat microalbumin screen with history of mild elevation.  Has been on lisinopril however a tickle described in his throat and will substitute losartan today as noted below.  - Microalbumin, Random Urine  - Glycosylated Hemoglobin A1c  - Basic Metabolic Panel    3. Benign Essential Hypertension  Blood pressure 154/84 on recheck.  Losartan 50 mg daily to be substituted for lisinopril 10 mg daily due to described tickle associated with lisinopril use.  Reassess at follow-up in 4 months.  - losartan (COZAAR) 50 MG tablet; Take 1 tablet (50 mg total) by mouth daily.  Dispense: 90 tablet; Refill: 3  - Basic Metabolic Panel    4. Neck Pain  Neck pain, chronic.  Had been prescribed Naprosyn recently.  Tizanidine caused too much sedation.  Using Tylenol 650 mg taken 2 capsules twice daily with mild improvement only.  Will await spine care referral recommendations.  - Ambulatory referral to [facility]    5. Overweight  Overweight status with weight goal remaining less than 175 pounds initially, less than 170 pounds  ideally.    6. Mild intermittent asthma without complication  Intermittent asthma history.  VHC spacer or plain tubular spacer prescribed as well.  Current albuterol metered-dose inhaler use on as-needed basis only if exposed to aerosols that are irritating.  Should not  until 2022 however refill was provided for as needed use in future.  - Tubular Spacer  - albuterol (PROAIR HFA) 90 mcg/actuation inhaler; Inhale 1 puff every 4 (four) hours as needed. Every 4-6 hours as needed.  Dispense: 1 Inhaler; Refill: 2    7. BPH with urinary obstruction  BPH.  Tamsulosin 0.4 mg daily.    8. Glaucoma suspect, bilateral  Continues to follow for glaucoma management, stable.    9. Colon cancer screening  Patient will consider Cologuard screen however hesitant for colon cancer screening in general.  - Cologuard    10. Hyperlipidemia LDL goal <100  Check lipid cascade today while fasting.  Atorvastatin 20 mg daily to continue.  - Lipid Cascade        The patient's current medical problems were reviewed.    I have had an Advance Directives discussion with the patient.  The following high BMI interventions were performed this visit: encouragement to exercise, weight monitoring, weight loss from baseline weight and lifestyle education regarding diet.  Ensure ongoing efforts to achieve weight goal < 175 pounds initially, < 170 pounds ideally.     The following health maintenance schedule was reviewed with the patient and provided in printed form in the after visit summary:   Health Maintenance   Topic Date Due     COLORECTAL CANCER SCREENING  Never done     ZOSTER VACCINES (2 of 3) 02/15/2012     A1C  2021     Asthma Control Test  2022     BMP  2022     DIABETIC FOOT EXAM  2022     LIPID  2022     MICROALBUMIN  2022     DIABETIC EYE EXAM  2022     ASTHMA ACTION PLAN  2022     MEDICARE ANNUAL WELLNESS VISIT  2022     FALL RISK ASSESSMENT  2022     TD 18+ HE   2024     ADVANCE CARE PLANNING  2026     HEPATITIS C SCREENING  Completed     Pneumococcal Vaccine: Pediatrics (0 to 5 Years) and At-Risk Patients (6 to 64 Years)  Completed     Pneumococcal Vaccine: 65+ Years  Completed     INFLUENZA VACCINE RULE BASED  Completed     COVID-19 Vaccine  Completed       Subjective:     Chief Complaint: Tashi Cuellar is an 75 y.o. male here for an Annual Wellness visit.     HPI: In general doing relatively well.  Chronic neck pain.  Was prescribed Naprosyn.  Tizanidine caused too much sedation.  Using Tylenol arthritis 650 mg using 2 capsules twice daily.  Tolerating well.  Lisinopril 10 mg daily for hypertension.  Tickle described.  Aspirin 81 mg daily underlying type 2 diabetes diet controlled.  Atorvastatin 20 mg daily for lipid management.  History of glaucoma followed by ophthalmology.  Would like a spacer or VHC device for as needed albuterol metered-dose inhaler if exposed to aerosols or dog smell etc.  History of BPH.  Tamsulosin 0.4 mg daily.  Follows with ophthalmology regarding chronic glaucoma management.  Comprehensive review of systems as above otherwise all negative.    Review of Systems:  Please see above.  The rest of the review of systems are negative for all systems.    Single   No children   Tobacco:  never   EtOH:  infrequent   Mom -  69 Alzheimer's disease   Dad -  buried alive as part of construction accident  (patient was only 15)   2 older sis - one  and one in nursing home   Surgeries:  none   Hospitalizations:  kidney stone (possibly right side)     Retired:  worked for construction company   Hobbies:  nothing especially    Patient Care Team:  Clifton Mccray MD as PCP - General  Derrick Geronimo MD as Assigned PCP     Patient Active Problem List   Diagnosis     Asthma     Verruca Warts     DM2 (diabetes mellitus, type 2) (H)     Gout     Benign Essential Hypertension     Arteriosclerotic Cardiovascular Disease  (ASCVD)     Unspecified glaucoma     Plantar Warts     Plantar fascial fibromatosis of right foot     Cervical Disc Degeneration     Neck Pain     SIRS (systemic inflammatory response syndrome) (H)     Renal colic on right side     Lactic acid acidosis     Past Medical History:   Diagnosis Date     ASCVD (arteriosclerotic cardiovascular disease)      Asthma      Diabetes mellitus, type II (H)      Gallstones      Glaucoma      Gout      HTN (hypertension)      Hydronephrosis of right kidney       Past Surgical History:   Procedure Laterality Date     CYSTOSCOPY W/ URETERAL STENT PLACEMENT Right 3/27/2016    Procedure: CYSTOSCOPY, RIGHT URETERAL STENT PLACEMENT, RIGHT RETROGRADE PYLOGRAM;  Surgeon: Salvador Mina MD;  Location: [facility] [place];  Service:       History reviewed. No pertinent family history.   Social History     Socioeconomic History     Marital status: Single     Spouse name: Not on file     Number of children: Not on file     Years of education: Not on file     Highest education level: Not on file   Occupational History     Not on file   Social Needs     Financial resource strain: Not on file     Food insecurity     Worry: Not on file     Inability: Not on file     Transportation needs     Medical: Not on file     Non-medical: Not on file   Tobacco Use     Smoking status: Never Smoker     Smokeless tobacco: Never Used   Substance and Sexual Activity     Alcohol use: No     Drug use: No     Sexual activity: Not on file   Lifestyle     Physical activity     Days per week: Not on file     Minutes per session: Not on file     Stress: Not on file   Relationships     Social connections     Talks on phone: Not on file     Gets together: Not on file     Attends L[employer]-day service: Not on file     Active member of club or organization: Not on file     Attends meetings of clubs or organizations: Not on file     Relationship status: Not on file     Intimate partner violence     Fear of current or ex  "partner: Not on file     Emotionally abused: Not on file     Physically abused: Not on file     Forced sexual activity: Not on file   Other Topics Concern     Not on file   Social History Narrative     Not on file      Current Outpatient Medications   Medication Sig Dispense Refill     albuterol (PROAIR HFA) 90 mcg/actuation inhaler Inhale 1 puff every 4 (four) hours as needed. Every 4-6 hours as needed. 1 Inhaler 2     aspirin 81 MG EC tablet TAKE 1 TABLET BY MOUTH EVERY DAY 90 tablet 1     atorvastatin (LIPITOR) 20 MG tablet Take 1 tablet (20 mg total) by mouth daily. 90 tablet 3     betaxolol (BETOPTIC-S) 0.25 % ophthalmic suspension Administer 1 drop to both eyes 2 (two) times a day.        bimatoprost (LUMIGAN) 0.03 % ophthalmic drops Administer 1 drop to both eyes every evening.       blood-glucose meter (ONETOUCH VERIO IQ METER) Misc Use 1 each As Directed as needed. 1 each 0     brimonidine (ALPHAGAN) 0.15 % ophthalmic solution Administer 1 drop to both eyes 3 (three) times a day.        calcium carbonate 300 mg (750 mg) Chew Chew 3 tablets 2 (two) times a day as needed.       dorzolamide (TRUSOPT) 2 % ophthalmic solution INSTILL 1 DROP INTO BOTH EYES 3 TIMES A DAY       naproxen (NAPROSYN) 500 MG tablet Take 1 tablet (500 mg total) by mouth 2 (two) times a day with meals. 60 tablet 1     ONETOUCH ULTRA CONTROL Soln USE 1 EACH AS DIRECTED AS NEEDED. 1 each 2     ONETOUCH VERIO TEST STRIPS strips TEST DAILY 100 strip 2     tamsulosin (FLOMAX) 0.4 mg Cp24 Take 0.4 mg by mouth daily.  3     losartan (COZAAR) 50 MG tablet Take 1 tablet (50 mg total) by mouth daily. 90 tablet 3     No current facility-administered medications for this visit.       Objective:   Vital Signs:   Visit Vitals  /84   Pulse 79   Ht 5' 5.5\" (1.664 m)   Wt 179 lb (81.2 kg)   SpO2 98%   BMI 29.33 kg/m           VisionScreening:  No exam data present     PHYSICAL EXAM    General Appearance:    Alert, cooperative, no distress, appears " stated age.  BMI 29.33.   Head:    Normocephalic, without obvious abnormality, atraumatic   Eyes:    PERRL, conjunctiva/corneas clear, EOM's intact, fundi     benign, both eyes        Ears:    Normal TM's and external ear canals, both ears   Nose:   Nares normal, septum midline, mucosa normal, no drainage    or sinus tenderness   Throat:   Lips, mucosa, and tongue normal; teeth and gums normal   Neck:   Supple, symmetrical, trachea midline, no adenopathy;        thyroid:  No enlargement/tenderness/nodules; no carotid    bruit or JVD   Back:     Symmetric, no curvature, ROM normal, no CVA tenderness   Lungs:     Clear to auscultation bilaterally, respirations unlabored   Chest wall:    No tenderness or deformity   Heart:    Regular rate and rhythm, S1 and S2 normal, no murmur, rub   or gallop   Abdomen:     Soft, non-tender, bowel sounds active all four quadrants,     no masses, no organomegaly.     Genitalia:    Normal male without lesion, discharge or tenderness.  Likely small reducible right inguinal hernia noted.     Rectal:    Normal tone.  Prostate normal/symmetric, no masses or tenderness.   Extremities:   Extremities normal, atraumatic, no cyanosis or edema   Pulses:   2+ and symmetric all extremities   Skin:   Skin color, texture, turgor normal, no rashes or lesions   Lymph nodes:   Cervical, supraclavicular, and axillary nodes normal   Neurologic:   CNII-XII intact. Normal strength, sensation and reflexes       throughout        Assessment Results 5/11/2021   Activities of Daily Living No help needed   Instrumental Activities of Daily Living No help needed   Get Up and Go Score Less than 12 seconds   Mini Cog Total Score 3   Some recent data might be hidden     A Mini-Cog score of 0-2 suggests the possibility of dementia, score of 3-5 suggests no dementia    Identified Health Risks:     The patient was provided with suggestions to help him develop a healthy physical lifestyle.   The patient was counseled  and encouraged to consider modifying their diet and eating habits. He was provided with information on recommended healthy diet options.  Information on urinary incontinence and treatment options given to patient.  The patient was provided with suggestions to help him develop a healthy emotional lifestyle.   Information regarding advance directives (living [place]), including where he can download the appropriate form, was provided to the patient via the AVS.

## 2021-06-17 NOTE — TELEPHONE ENCOUNTER
Refill Approved    Rx renewed per Medication Renewal Policy. Medication was last renewed on 3/2/20.    Anson SAMANTHAEric Lopez, [facility] Triage/Med Refill 5/10/2021     Requested Prescriptions   Pending Prescriptions Disp Refills     ONETOUCH VERIO TEST STRIPS strips [Pharmacy Med Name: ONE TOUCH VERIO TEST STRIP] 100 strip 4     Sig: TEST DAILY       Diabetic Supplies Refill Protocol Passed - 5/9/2021 12:13 AM        Passed - Visit with PCP or prescribing provider visit in last 6 months     Last office visit with prescriber/PCP: 7/9/2020 Clifton Mccray MD OR same dept: 3/19/2021 Derrick Geronimo MD OR same specialty: 3/19/2021 Derrick Geronimo MD  Last physical: 7/20/2020 Last MTM visit: Visit date not found   Next visit within 3 mo: Visit date not found  Next physical within 3 mo: Visit date not found  Prescriber OR PCP: Clifton Mccray MD  Last diagnosis associated with med order: 1. DM2 (diabetes mellitus, type 2) (H)  - ONETOUCH VERIO TEST STRIPS strips [Pharmacy Med Name: ONE TOUCH VERIO TEST STRIP]; TEST DAILY  Dispense: 100 strip; Refill: 4    If protocol passes may refill for 12 months if within 3 months of last provider visit (or a total of 15 months).             Passed - A1C in last 6 months     Hemoglobin A1c   Date Value Ref Range Status   01/07/2021 7.1 (H) <=5.6 % Final

## 2021-06-17 NOTE — TELEPHONE ENCOUNTER
Phone call to patient to review results and provider's recommendations. Results given and explained. Discussed the recommendation for PT and seeing neurology. Also offered for patient to return to clinic to review the images and treatments with PSP. Assisted pt with scheduling the appointment for tomorrow AM.

## 2021-06-17 NOTE — PATIENT INSTRUCTIONS - HE
[facility] Scheduling    Please call [phone] to schedule your image(s) (select option #1). There are 2 different locations, see below. You can do walk-in visits for xray only images if you want.     [facility]  [address] [place] [id number]    [address] [address]

## 2021-06-17 NOTE — PROGRESS NOTES
Assessment:   Tashi Cuellar is a 75 y.o. y.o. male with past medical history significant for asthma, type 2 diabetes mellitus, gout, hypertension, cardiovascular disease who presents today for follow-up regarding chronic neck pain and headaches. MRI brain showed no acute intracranial pathology. MRI cervical spine showed moderate right C4-5 foraminal stenosis but was otherwise unremarkable.  I do not think headaches are cervicogenic.  He describes frontal/temporal headaches which is not typical for cervicogenic headache.  He does not have any upper cervical facet arthropathy.  He does not have any C2 impingement.  He does not have any tenderness to palpation over the occipital nerves.  I think he should see neurology for further evaluation of headache pain.  Axial neck pain is likely related to the minor cervical spondylosis.  He has intermittent right upper extremity pain but this starts at the base of the right thumb and radiates up the arm, rather than radiating down from the neck.  He does not have C6 impingement to correlate with pain in this distribution.  He does not appear to be symptomatic from the moderate right foraminal stenosis at C4-5.       Plan:     A shared decision making plan was used.  The patient's values and choices were respected.  The following represents what was discussed and decided upon by the physician assistant and the patient.      1.  DIAGNOSTIC TESTS: I reviewed the MRI scans of the brain and cervical spine with the patient. No further diagnostic tests were ordered.    2.  PHYSICAL THERAPY: I offered her referral to physical therapy for the neck pain.  He will consider this and will call our clinic if he wants to pursue it.    3.  MEDICATIONS: No changes are made to the patient's medications.  -He can continue the Tylenol as needed.  -Naproxen cause stomach irritation.  -Tizanidine caused too much sedation.    4.  INTERVENTIONS: No interventions were ordered.  Patient was hoping  that there would be an injection to cure his headaches.  I told the patient that I do not think I have an injection that will help with his headache pain.  He does not have occipital neuralgia.  He does not have facet arthropathy or tenderness over the upper cervical facets so I do not think cervical facet injections/radiofrequency ablation will be of benefit.  His headaches are more frontal/temporal headaches which are not typical for cervicogenic headaches.    5. REFERRALS: Entered a referral to neurology for further evaluation of his headaches.    6.  FOLLOW-UP: Patient will follow up with me as needed.  If he has questions or concerns, he should not hesitate to call.    Subjective:     Tashi Cuellar is a 75 y.o. male who presents today for follow-up regarding chronic neck pain and headaches. I saw the patient in consultation May 13, 2021. At that time I ordered MRI scans of the brain and cervical spine. He returns today to review the results.  He denies any change in his symptoms since he was last seen.    Patient complains of intermittent headaches.  Headaches are typically in the frontal and temporal region on both sides.  He denies significant occipital pain.  He states that over the weekend he had severe headaches, but last night he felt much better.  He was able to sleep in his bed and he woke up this morning without pain.  Typically lying down in bed makes the headache pain worse so he was surprised he was able to lie in bed last night.  He also has chronic bilateral neck pain.  He has intermittent pain in the right thumb which sometimes radiates up the arm.  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  He denies any pain radiating from the neck down the arm.  Denies any numbness, tingling, weakness.  He rates his pain today as a 0-10.  At its worst it is a 7 out of 10.  At its best it is a 0-10.  He is unable to identify any aggravating factors for the pain.  He is  unable to identify alleviating factors to the pain.  He states that he used to get sinus headaches would respond well to heat but he has not been helpful for his current headaches.    Patient has not had any treatments for this. Tizanidine was not helpful and caused dizziness. Naproxen caused significant stomach irritation.  He takes Tylenol 1300 mg twice daily which is somewhat helpful.    Past medical history is reviewed and is unchanged.    Review of Systems:  Positive for headache, pain much worse at night.  Negative for numbness/tingling, weakness, loss of bowel/bladder control, inability to urinate, trip/stumble/falls, difficulty swallowing, difficulty with hand skills, fevers, unintentional weight loss.     Objective:   CONSTITUTIONAL:  Vital signs as above.  No acute distress.  The patient is well nourished and well groomed.    PSYCHIATRIC:  The patient is awake, alert, oriented to person, place and time.  The patient is answering questions appropriately with clear speech.  Normal affect.  HEENT: Normocephalic, atraumatic.  Sclera clear.    LYMPH: No cervical lymphadenopathy  SKIN:  Skin over the face, neck bilateral upper extremities is clean, dry, intact without rashes.  MUSCULOSKELETAL: The patient has 5/5 strength for the bilateral shoulder abductors, elbow flexors/extensors, wrist extensors, finger flexors/abductors.  No tenderness palpation cervical paraspinous muscles or upper trapezius muscles.  No tenderness palpation midline cervical spine.  No tenderness palpation occipital nerves.  NEUROLOGICAL: 1-2+ biceps, brachioradialis, triceps reflexes bilaterally.  Negative Williamson's bilaterally.  Sensation to light touch is intact bilateral upper extremities throughout.    RESULTS:    I reviewed the MRI brain from [facility] dated May 21, 2021. Shows no acute intracranial process. There is a cluster presumed prominent perivascular spaces and adjacent gliosis within the left parietal/periatrial white  matter. I spoke to radiology regarding this result. It is not thought to be the cause of new headaches. There is generalized brain atrophy and presumed microvascular ischemic changes.    I reviewed the MRI cervical spine from [facility] dated May 21, 2021. This shows mild cervical spondylosis. At C4-5 there is moderate right and mild left foraminal stenosis.

## 2021-06-17 NOTE — PATIENT INSTRUCTIONS - HE
I entered a referral to [facility] neurology for further evaluation of your headaches.  They will call you to schedule a consultation.  If you have not heard from them within 72 hours, please call [phone].    I do think physical therapy could be helpful for your neck pain.  If you would like to pursue physical therapy, please call [phone] and I will get that set up for you.

## 2021-06-18 NOTE — PROGRESS NOTES
Assessment:     1. DM2 (diabetes mellitus, type 2)  Comprehensive Metabolic Panel    Glycosylated Hemoglobin A1c    Urinalysis-UC if Indicated    Ambulatory referral to Podiatry   2. Benign Essential Hypertension  Comprehensive Metabolic Panel    Glycosylated Hemoglobin A1c    Urinalysis-UC if Indicated    Ambulatory referral to Podiatry   3. Foot mass, left  Ambulatory referral to Podiatry   Workup to include patient is to continue to take his amlodipine baby aspirin workup to include we will write him a letter with the results    Plan:     1. DM2 (diabetes mellitus, type 2)    - Comprehensive Metabolic Panel  - Glycosylated Hemoglobin A1c  - Urinalysis-UC if Indicated  - Ambulatory referral to Podiatry    2. Benign Essential Hypertension    - Comprehensive Metabolic Panel  - Glycosylated Hemoglobin A1c  - Urinalysis-UC if Indicated  - Ambulatory referral to Podiatry    3. Foot mass, left  Referral to podiatry  - Ambulatory referral to Podiatry      Subjective:   Patient is being seen for follow-up of his type 2 diabetes which is well managed with diet patient's blood sugars have been reviewed completely her today over the last 3 months and are running 110-130.  Patient denies any visual complaints hearing loss dysphagia shortness of breath gastroparesis abdominal pain diarrhea constipation urgency frequency dysuria only complaint is a tender mass left lateral foot area which we have seen before podiatry is evaluated soft tissue mass appears to be enlarging.  We will have podiatry look at this again.  There is no superficial ulceration seen at this time on the mass.  All medical questions that were asked were answered patient may take OTC Tylenol arthritis for pain.  He did have questions about pain management we answered these to the best of her ability today.  Safest thing would be OTC Tylenol.    Review of Systems: A complete 14 point review of systems was obtained and is negative or as stated in the history of  "present illness.    Past Medical History:   Diagnosis Date     ASCVD (arteriosclerotic cardiovascular disease)      Asthma      Diabetes mellitus, type II      Gallstones      Glaucoma      Gout      HTN (hypertension)      Hydronephrosis of right kidney      No family history on file.  Past Surgical History:   Procedure Laterality Date     CYSTOSCOPY W/ URETERAL STENT PLACEMENT Right 3/27/2016    Procedure: CYSTOSCOPY, RIGHT URETERAL STENT PLACEMENT, RIGHT RETROGRADE PYLOGRAM;  Surgeon: Salvador Mina MD;  Location: [facility];  Service:      Social History   Substance Use Topics     Smoking status: Never Smoker     Smokeless tobacco: Never Used     Alcohol use No         Objective:   /72  Pulse 80  Ht 5' 7.5\" (1.715 m)  Wt 174 lb 1.6 oz (79 kg)  BMI 26.87 kg/m2    General Appearance:  Alert, cooperative, no distress  Head:  Normocephalic, no obvious abnormality  Ears: TM anatomy normal  Eyes:  PERRL, EOM's intact, conjunctiva and corneas clear  Nose:  Nares symmetrical, septum midline, mucosa pink, no sinus tenderness  Throat:  Lips, tongue, and mucosa are moist, pink, and intact  Neck:  Supple, symmetrical, trachea midline, no adenopathy; thyroid: no enlargement, symmetric,no tenderness/mass/nodules; no carotid bruit, no JVD  Back:  Symmetrical, no curvature, ROM normal, no CVA tenderness  Chest/Breast:  No mass or tenderness  Lungs:  Clear to auscultation bilaterally, respirations unlabored   Heart:  Normal PMI, regular rate & rhythm, S1 and S2 normal, no murmurs, rubs, or gallops  Abdomen:  Soft, non-tender, bowel sounds active all four quadrants, no mass, or organomegaly  Musculoskeletal:  Tone and strength strong and symmetrical, all extremities  Lymphatic:  No adenopathy  Skin/Hair/Nails:  Skin warm, dry, and intact, no rashes  Neurologic:  Alert and oriented x3, no cranial nerve deficits, normal strength and tone, gait steady  Extremities:  No edema.  Sera's sign negative.  Patient has " a tender swollen mass middle aspect left metatarsal area left foot podiatry is already seen this but I would like them to recheck it again there is no sign of an ulcer it appears to be a soft tissue mass relatively unchanged from last time I saw  Genitourinary: deferred  Pulses:  Equal bilaterally     I have had an Advance Directives discussion with the patient.      This note has been dictated using voice recognition software. Any grammatical or context distortions are unintentional and inherent to the the software.

## 2021-06-19 NOTE — LETTER
Letter by Clifton Mccray MD at      Author: Clifton Mccray MD Service: -- Author Type: --    Filed:  Encounter Date: 6/6/2019 Status: (Other)         Tashi Cuellar  [address] [place] [address] [address]  [facility] [address]             June 6, 2019         Dear Mr. Cuellar,    Below are the results from your recent visit:    Resulted Orders   Glycosylated Hemoglobin A1c   Result Value Ref Range    Hemoglobin A1c 7.0 (H) 3.5 - 6.0 %   Creatinine, Random Urine   Result Value Ref Range    Creatinine, Urine 101.3 mg/dL    Narrative    The reference range has not been established for creatinine  in random urines. The results should be integrated into the  clinical context for interpretation.   Microalbumin, Random Urine   Result Value Ref Range    Microalbumin, Random Urine 1.98 0.00 - 1.99 mg/dL    Creatinine, Urine 101.3 mg/dL    Microalbumin/Creatinine Ratio Random Urine 19.5 <=19.9 mg/g    Narrative    Microalbumin, Random Urine  <2.0 mg/dL . . . . . . . . Normal  3.0-30.0 mg/dL . . . . . . Microalbuminuria  >30.0 mg/dL . . . . . .  . Clinical Proteinuria  Microalbumin/Creatinine Ratio, Random Urine  <20 mg/g . . . . .. . . . Normal   mg/g . . . . . . . Microalbuminuria  >300 mg/g . . . . . . . . Clinical Proteinuria       All of your numbers are better.    Please call with questions or contact us using [employer].    Sincerely,        Electronically signed by Clifton Mccray MD

## 2021-06-20 NOTE — LETTER
Letter by Clifton Mccray MD at      Author: Clifton Mccray MD Service: -- Author Type: --    Filed:  Encounter Date: 7/9/2020 Status: (Other)         Tashi Cuellar  [address] [place] [address] [address]  [facility] [address]             July 9, 2020         Dear Mr. Cuellar,    Below are the results from your recent visit:    Resulted Orders   Creatinine, Random Urine   Result Value Ref Range    Creatinine, Urine 124.5 mg/dL    Narrative    The reference range has not been established for creatinine  in random urines. The results should be integrated into the  clinical context for interpretation.   Microalbumin, Random Urine   Result Value Ref Range    Microalbumin, Random Urine 4.46 (H) 0.00 - 1.99 mg/dL    Creatinine, Urine 124.5 mg/dL    Microalbumin/Creatinine Ratio Random Urine 35.8 (H) <=19.9 mg/g    Narrative    Microalbumin, Random Urine  <2.0 mg/dL . . . . . . . . Normal  3.0-30.0 mg/dL . . . . . . Microalbuminuria  >30.0 mg/dL . . . . . .  . Clinical Proteinuria    Microalbumin/Creatinine Ratio, Random Urine  <20 mg/g . . . . .. . . . Normal   mg/g . . . . . . . Microalbuminuria  >300 mg/g . . . . . . . . Clinical Proteinuria       Glycosylated Hemoglobin A1c   Result Value Ref Range    Hemoglobin A1c 7.3 (H) 3.5 - 6.0 %       Your A1c has gone up to 7.3.  You are spilling a little more protein in your urine than the last time we checked.  I would suggest tightening your diet a little bit more and exercising about 25% more than you have been and we will repeat these tests when you come in for your annual wellness check    Please call with questions or contact us using [employer].    Sincerely,        Electronically signed by Clifton Mccray MD

## 2021-06-21 NOTE — LETTER
Letter by Derrick Geronimo MD at      Author: Derrick Geronimo MD Service: -- Author Type: --    Filed:  Encounter Date: 1/8/2021 Status: (Other)         Tashi Cuellar  [address] [place] [address] [address]  [place] [address]             January 8, 2021         Dear Mr. Cuellar,    Below are the results from your recent visit:    Resulted Orders   Glycosylated Hemoglobin A1c   Result Value Ref Range    Hemoglobin A1c 7.1 (H) <=5.6 %   Lipid Cascade   Result Value Ref Range    Cholesterol 181 <=199 mg/dL    Triglycerides 106 <=149 mg/dL    HDL Cholesterol 45 >=40 mg/dL    LDL Calculated 115 <=129 mg/dL    Patient Fasting > 8hrs? Yes    Basic Metabolic Panel   Result Value Ref Range    Sodium 138 136 - 145 mmol/L    Potassium 4.0 3.5 - 5.0 mmol/L    Chloride 104 98 - 107 mmol/L    CO2 25 22 - 31 mmol/L    Anion Gap, Calculation 9 5 - 18 mmol/L    Glucose 127 (H) 70 - 125 mg/dL    Calcium 8.6 8.5 - 10.5 mg/dL    BUN 20 8 - 28 mg/dL    Creatinine 0.81 0.70 - 1.30 mg/dL    GFR MDRD Af Amer >60 >60 mL/min/1.73m2    GFR MDRD Non Af Amer >60 >60 mL/min/1.73m2    Narrative    Fasting Glucose reference range is 70-99 mg/dL per  American Diabetes Association (ADA) guidelines.   Hepatitis C Antibody (Anti-HCV)   Result Value Ref Range    Hepatitis C Ab Negative Negative   Microalbumin, Random Urine   Result Value Ref Range    Microalbumin, Random Urine 3.52 (H) 0.00 - 1.99 mg/dL    Creatinine, Urine 143.1 mg/dL    Microalbumin/Creatinine Ratio Random Urine 24.6 (H) <=19.9 mg/g    Narrative    Microalbumin, Random Urine  <2.0 mg/dL . . . . . . . . Normal  3.0-30.0 mg/dL . . . . . . Microalbuminuria  >30.0 mg/dL . . . . . .  . Clinical Proteinuria    Microalbumin/Creatinine Ratio, Random Urine  <20 mg/g . . . . .. . . . Normal   mg/g . . . . . . . Microalbuminuria  >300 mg/g . . . . . . . . Clinical Proteinuria           Continue your current diabetes management as discussed in order to further improve.  Goal A1c remains  < 7.0% ideally.     Your kidney function tests (i.e. Basic Metabolic Panel) were otherwise normal.     Your cholesterol results were near goal.  As discussed, initiate atorvastatin 20 mg daily in order to further improve while decreasing future risk for heart attacks or strokes.  Ensure regular exercise, healthy diet, and weight loss modifications in order to further improve.  Weight goal < 170 pounds initially, < 165 pounds ideally.  We will plan to recheck your labs while fasting in the next 6-12 months to ensure desired improvement.       Your hepatitis C screen based on age criteria was normal.     Your urine screen was improved.  No evidence for significant protein in your urine.  Continue lisinopril which helps to protect your kidneys.  We will plan to repeat this test on a yearly basis.     Please call with questions or contact us using [employer].    Sincerely,        Electronically signed by Derrick Geronimo MD

## 2021-06-21 NOTE — LETTER
Letter by Derrick Geronimo MD at      Author: Derrick Geronimo MD Service: -- Author Type: --    Filed:  Encounter Date: 5/12/2021 Status: (Other)         Tashi Cuellar  [address] [place] [address] [address]  [facility] [address]             May 12, 2021         Dear Mr. Cuellar,    Below are the results from your recent visit:    Resulted Orders   Microalbumin, Random Urine   Result Value Ref Range    Microalbumin, Random Urine 1.98 0.00 - 1.99 mg/dL    Creatinine, Urine 43.5 mg/dL    Microalbumin/Creatinine Ratio Random Urine 45.5 (H) <=19.9 mg/g    Narrative    Microalbumin, Random Urine  <2.0 mg/dL . . . . . . . . Normal  3.0-30.0 mg/dL . . . . . . Microalbuminuria  >30.0 mg/dL . . . . . .  . Clinical Proteinuria    Microalbumin/Creatinine Ratio, Random Urine  <20 mg/g . . . . .. . . . Normal   mg/g . . . . . . . Microalbuminuria  >300 mg/g . . . . . . . . Clinical Proteinuria       Glycosylated Hemoglobin A1c   Result Value Ref Range    Hemoglobin A1c 7.2 (H) <=5.6 %   Basic Metabolic Panel   Result Value Ref Range    Sodium 138 136 - 145 mmol/L    Potassium 4.1 3.5 - 5.0 mmol/L    Chloride 102 98 - 107 mmol/L    CO2 27 22 - 31 mmol/L    Anion Gap, Calculation 9 5 - 18 mmol/L    Glucose 143 (H) 70 - 125 mg/dL    Calcium 9.3 8.5 - 10.5 mg/dL    BUN 19 8 - 28 mg/dL    Creatinine 0.80 0.70 - 1.30 mg/dL    GFR MDRD Af Amer >60 >60 mL/min/1.73m2    GFR MDRD Non Af Amer >60 >60 mL/min/1.73m2    Narrative    Fasting Glucose reference range is 70-99 mg/dL per  American Diabetes Association (ADA) guidelines.   Lipid Cascade   Result Value Ref Range    Cholesterol 129 <=199 mg/dL    Triglycerides 109 <=149 mg/dL    HDL Cholesterol 43 >=40 mg/dL    LDL Calculated 64 <=129 mg/dL    Patient Fasting > 8hrs? Yes        Your urine screen was mildly abnormal.  Slight increase of protein in your urine.  Continue your current medication (losaratan 50 mg daily) as discussed which helps to protect your kidneys.  We will  repeat this test in 6-12 months to ensure stable or further improvement.    Continue your current diabetes management as discussed in order to further improve.  Goal A1c remains < 7.0% ideally.     Your kidney function tests (i.e. Basic Metabolic Panel) were otherwise normal.    Your cholesterol results were near goal.  Continue your current medication as discussed.   Ensure regular exercise, healthy diet, and weight loss modifications in order to further improve.  Weight goal < 175 pounds initially, < 170 pounds ideally.  We will plan to recheck your labs while fasting in the next 6-12 months to ensure desired improvement.       Please call with questions or contact us using [employer].    Sincerely,        Electronically signed by Derrick Geronimo MD

## 2021-06-22 NOTE — PROGRESS NOTES
Assessment:     1. DM2 (diabetes mellitus, type 2) (H)  Glycosylated Hemoglobin A1c    Comprehensive Metabolic Panel    Urinalysis-UC if Indicated   2. Plantar Fasciitis     3. Osteoarthritis of multiple joints, unspecified osteoarthritis type     4. Renal colic on right side  albuterol (PROAIR HFA) 90 mcg/actuation inhaler       Plan:     1. DM2 (diabetes mellitus, type 2) (H)  6-month workup to include the following no change in diet or medication  - Glycosylated Hemoglobin A1c  - Comprehensive Metabolic Panel  - Urinalysis-UC if Indicated    2. Plantar Fasciitis  I suggested referral to podiatry but patient would rather defer    3. Osteoarthritis of multiple joints, unspecified osteoarthritis type  I spent a lot of time going over the fact that a couple of Tylenol a day for chronic pain are perfectly safe; hopefully patient will do it    4. Renal colic on right side  This problem has gone away; patient needs refill of his inhaler for acute asthma attacks which are rare  - albuterol (PROAIR HFA) 90 mcg/actuation inhaler; Inhale 1 puff every 4 (four) hours as needed Every 4-6 hours as needed. .  Dispense: 1 Inhaler; Refill: 2      Subjective:   Seeing the patient for multiple issues today for follow-up of his diabetes type 2 blood sugars been running 1/10 to 1/20/70 Keeps a careful diary and brings a known he denies any circulatory problems shortness of breath dyspnea symptoms of leg cramps gastroparesis.  Patient was advised to take a baby aspirin but he does not want to.  We discussed pros and cons I suggest that he does.  For chronic pain and arthritis type symptoms I suggest he take Tylenol twice daily 325.  He has a plantar wart on the bottom of his foot which I offered to freeze today but he does not really want down.  His blood pressure is under good control we will not change that medication.  Multiple questions were asked and answered and debated today with the patient and I feel he is quite stable he also  needs his albuterol inhaler refilled which we approved will see the patient for follow-up 6 [id number] minute visit    Review of Systems: A complete 14 point review of systems was obtained and is negative or as stated in the history of present illness.    Past Medical History:   Diagnosis Date     ASCVD (arteriosclerotic cardiovascular disease)      Asthma      Diabetes mellitus, type II (H)      Gallstones      Glaucoma      Gout      HTN (hypertension)      Hydronephrosis of right kidney      No family history on file.  Past Surgical History:   Procedure Laterality Date     CYSTOSCOPY W/ URETERAL STENT PLACEMENT Right 3/27/2016    Procedure: CYSTOSCOPY, RIGHT URETERAL STENT PLACEMENT, RIGHT RETROGRADE PYLOGRAM;  Surgeon: Salvador Mina MD;  Location: [facility];  Service:      Social History     Tobacco Use     Smoking status: Never Smoker     Smokeless tobacco: Never Used   Substance Use Topics     Alcohol use: No     Drug use: No         Objective:   /70   Pulse 72   Wt 180 lb (81.6 kg)   BMI 27.78 kg/m      General Appearance:  Alert, cooperative, no distress  Head:  Normocephalic, no obvious abnormality  Ears: TM anatomy normal  Eyes:  PERRL, EOM's intact, conjunctiva and corneas clear  Nose:  Nares symmetrical, septum midline, mucosa pink, no sinus tenderness  Throat:  Lips, tongue, and mucosa are moist, pink, and intact  Neck:  Supple, symmetrical, trachea midline, no adenopathy; thyroid: no enlargement, symmetric,no tenderness/mass/nodules; no carotid bruit, no JVD  Back:  Symmetrical, no curvature, ROM normal, no CVA tenderness  Chest/Breast:  No mass or tenderness  Lungs:  Clear to auscultation bilaterally, respirations unlabored   Heart:  Normal PMI, regular rate & rhythm, S1 and S2 normal, no murmurs, rubs, or gallops  Abdomen:  Soft, non-tender, bowel sounds active all four quadrants, no mass, or organomegaly  Musculoskeletal:  Tone and strength strong and symmetrical, all  extremities  Lymphatic:  No adenopathy  Skin/Hair/Nails:  Skin warm, dry, and intact, no rashes  Neurologic:  Alert and oriented x3, no cranial nerve deficits, normal strength and tone, gait steady  Extremities:  No edema.  Sera's sign negative.    Genitourinary: deferred  Pulses:  Equal bilaterally     I have had an Advance Directives discussion with the patient.      This note has been dictated using voice recognition software. Any grammatical or context distortions are unintentional and inherent to the the software.

## 2021-06-23 NOTE — TELEPHONE ENCOUNTER
Refill Approved    Rx renewed per Medication Renewal Policy. Medication was last renewed on:  Control solution 12/12/2017 for 1/2 refills.  Strips 9/4/2017 100/0  Last OV 12/5/2018  Myrna K Brendon, [facility] Triage/Med Refill 1/21/2019     Requested Prescriptions   Pending Prescriptions Disp Refills     ONETOUCH VERIO strips [Pharmacy Med Name: ONE TOUCH VERIO TEST STRIP] 100 strip 4     Sig: USE 1 EACH AS DIRECTED DAILY. DISPENSE BRAND PER PATIENT'S INSURANCE AT PHARMACY DISCRETION.    Diabetic Supplies Refill Protocol Passed - 1/18/2019  4:59 PM       Passed - Visit with PCP or prescribing provider visit in last 6 months    Last office visit with prescriber/PCP: 12/5/2018 Clifton Mccray MD OR same dept: 12/5/2018 Clifton Mccray MD OR same specialty: 12/5/2018 Clifton Mccray MD  Last physical: Visit date not found Last MTM visit: Visit date not found   Next visit within 3 mo: Visit date not found  Next physical within 3 mo: Visit date not found  Prescriber OR PCP: Clifton Mccray MD  Last diagnosis associated with med order: 1. DM2 (diabetes mellitus, type 2) (H)  - ONETOUCH VERIO strips [Pharmacy Med Name: ONE TOUCH VERIO TEST STRIP]; USE 1 EACH AS DIRECTED DAILY. DISPENSE BRAND PER PATIENT'S INSURANCE AT PHARMACY DISCRETION.  Dispense: 100 strip; Refill: 4  - ONETOUCH ULTRA CONTROL Soln [Pharmacy Med Name: ONE TOUCH ULTRA CONTROL SOLN]; USE 1 EACH AS DIRECTED AS NEEDED.; Refill: 0    If protocol passes may refill for 12 months if within 3 months of last provider visit (or a total of 15 months).            Passed - A1C in last 6 months    Hemoglobin A1c   Date Value Ref Range Status   12/05/2018 7.3 (H) 3.5 - 6.0 % Final               ONETOUCH ULTRA CONTROL Soln [Pharmacy Med Name: ONE TOUCH ULTRA CONTROL SOLN]  0     Sig: USE 1 EACH AS DIRECTED AS NEEDED.    Diabetic Supplies Refill Protocol Passed - 1/18/2019  4:59 PM       Passed - Visit with PCP or prescribing provider visit in last 6 months    Last  office visit with prescriber/PCP: 12/5/2018 Clifton Mccray MD OR same dept: 12/5/2018 Clifton Mccray MD OR same specialty: 12/5/2018 Clifton Mccray MD  Last physical: Visit date not found Last MTM visit: Visit date not found   Next visit within 3 mo: Visit date not found  Next physical within 3 mo: Visit date not found  Prescriber OR PCP: Clifton Mccray MD  Last diagnosis associated with med order: 1. DM2 (diabetes mellitus, type 2) (H)  - ONETOUCH VERIO strips [Pharmacy Med Name: ONE TOUCH VERIO TEST STRIP]; USE 1 EACH AS DIRECTED DAILY. DISPENSE BRAND PER PATIENT'S INSURANCE AT PHARMACY DISCRETION.  Dispense: 100 strip; Refill: 4  - ONETOUCH ULTRA CONTROL Soln [Pharmacy Med Name: ONE TOUCH ULTRA CONTROL SOLN]; USE 1 EACH AS DIRECTED AS NEEDED.; Refill: 0    If protocol passes may refill for 12 months if within 3 months of last provider visit (or a total of 15 months).            Passed - A1C in last 6 months    Hemoglobin A1c   Date Value Ref Range Status   12/05/2018 7.3 (H) 3.5 - 6.0 % Final

## 2021-07-03 NOTE — ADDENDUM NOTE
Addendum Note by Yajaira Cr MLT at 12/5/2019  9:40 AM     Author: Yajaira Cr MLT Service: -- Author Type:     Filed: 12/5/2019 10:28 AM Encounter Date: 12/5/2019 Status: Signed    : Yajaira Cr MLT ()    Addended by: YAJAIRA CR on: 12/5/2019 10:28 AM        Modules accepted: Orders

## 2021-07-06 VITALS — WEIGHT: 178 LBS | HEIGHT: 66 IN | BODY MASS INDEX: 28.61 KG/M2

## 2021-07-09 ENCOUNTER — RECORDS - HEALTHEAST (OUTPATIENT)
Dept: ADMINISTRATIVE | Facility: OTHER | Age: 76
End: 2021-07-09

## 2021-07-09 ENCOUNTER — OFFICE VISIT (OUTPATIENT)
Dept: NEUROLOGY | Facility: CLINIC | Age: 76
End: 2021-07-09
Payer: COMMERCIAL

## 2021-07-09 VITALS
WEIGHT: 180 LBS | SYSTOLIC BLOOD PRESSURE: 159 MMHG | HEIGHT: 66 IN | BODY MASS INDEX: 28.93 KG/M2 | HEART RATE: 77 BPM | DIASTOLIC BLOOD PRESSURE: 96 MMHG

## 2021-07-09 DIAGNOSIS — M79.644 PAIN OF FINGER OF RIGHT HAND: ICD-10-CM

## 2021-07-09 DIAGNOSIS — G44.219 EPISODIC TENSION-TYPE HEADACHE, NOT INTRACTABLE: ICD-10-CM

## 2021-07-09 DIAGNOSIS — M54.2 NECK PAIN: Primary | ICD-10-CM

## 2021-07-09 PROCEDURE — 99205 OFFICE O/P NEW HI 60 MIN: CPT | Performed by: PSYCHIATRY & NEUROLOGY

## 2021-07-09 RX ORDER — NORTRIPTYLINE HCL 10 MG
CAPSULE ORAL
Qty: 180 CAPSULE | Refills: 1 | Status: SHIPPED | OUTPATIENT
Start: 2021-07-09 | End: 2021-08-10

## 2021-07-09 RX ORDER — PREDNISONE 20 MG/1
60 TABLET ORAL DAILY
Qty: 18 TABLET | Refills: 0 | Status: SHIPPED | OUTPATIENT
Start: 2021-07-09 | End: 2021-07-15

## 2021-07-09 ASSESSMENT — MIFFLIN-ST. JEOR: SCORE: 1494.22

## 2021-07-09 NOTE — NURSING NOTE
Chief Complaint   Patient presents with     Head pain     Martine Valentin RN on 7/9/2021 at 8:57 AM

## 2021-07-09 NOTE — PROGRESS NOTES
NEUROLOGY OUTPATIENT CONSULT NOTE  Jul 9, 2021     CHIEF COMPLAINT/REASON FOR VISIT/REASON FOR CONSULT  Patient presents with:  Head pain    REASON FOR CONSULTATION- Headaches    REFERRAL SOURCE  Dr. Marga Brown  CC Dr. Marga Brown    HISTORY OF PRESENT ILLNESS  Tashi Cuellar is a 75 year old male seen today for evaluation of headaches. Patient reports that he has been having headaches for several years. He currently takes 1300 mg of Tylenol twice a day which keeps the headaches at bay. He barely has any headaches if he does this all the time. Denies any side effects with the Tylenol. Has not tried any preventive medications or abortive medications in the past. Was prescribed a medication that starts with a N which cause a lot of stomach pain. The headaches are more frontal in nature. The bilateral. Small for dull pain with no photophobia phonophobia or auras. Reports no triggers for his pain    He also complains of some neck pain. There is no significant numbness though does complain of some pain radiating from his right thumb to his neck. There is some pain in his right thumb also. Has had a MRI of the cervical spine which shows possible C5 impingement/neuroforaminal stenosis. Is also being worked up in the spine center. Was referred to neurology for further evaluation.    Previous history is reviewed and this is unchanged.    PAST MEDICAL/SURGICAL HISTORY  History reviewed. No pertinent past medical history.  Patient Active Problem List   Diagnosis     Asthma     Verruca Warts     DM2 (diabetes mellitus, type 2) (H)     Gout     Benign Essential Hypertension     Arteriosclerotic Cardiovascular Disease (ASCVD)     Unspecified glaucoma     Plantar Warts     Plantar fascial fibromatosis of right foot     Cervical Disc Degeneration     Neck Pain     SIRS (systemic inflammatory response syndrome) (H)     Renal colic on right side     Lactic acid acidosis   Significant for asthma, diabetes, gout, hypertension,  cataract, blood in the eyes, glaucoma surgery    FAMILY HISTORY  History reviewed. No pertinent family history.  Family history negative for headaches  SOCIAL HISTORY  Social History     Tobacco Use     Smoking status: Current Some Day Smoker     Types: Pipe     Smokeless tobacco: Never Used   Substance Use Topics     Alcohol use: Not Currently     Drug use: Not Currently       SYSTEMS REVIEW  Twelve-system ROS was done and other than the HPI this was negative. Pertinent positives noted in the HPI.    MEDICATIONS  ACETAMINOPHEN PO, Take 1,300 mg by mouth 2 times daily  albuterol (PROAIR HFA) 90 mcg/actuation inhaler, [ALBUTEROL (PROAIR HFA) 90 MCG/ACTUATION INHALER] Inhale 1 puff every 4 (four) hours as needed. Every 4-6 hours as needed.  atorvastatin (LIPITOR) 20 MG tablet, [ATORVASTATIN (LIPITOR) 20 MG TABLET] Take 1 tablet (20 mg total) by mouth daily.  betaxolol (BETOPTIC-S) 0.25 % ophthalmic suspension, [BETAXOLOL (BETOPTIC-S) 0.25 % OPHTHALMIC SUSPENSION] Administer 1 drop to both eyes 2 (two) times a day.   bimatoprost (LUMIGAN) 0.03 % ophthalmic drops, [BIMATOPROST (LUMIGAN) 0.03 % OPHTHALMIC DROPS] Administer 1 drop to both eyes every evening.  blood-glucose meter (ONETOUCH VERIO IQ METER) Misc, [BLOOD-GLUCOSE METER (ONETOUCH VERIO IQ METER) MISC] Use 1 each As Directed as needed.  brimonidine (ALPHAGAN) 0.15 % ophthalmic solution, [BRIMONIDINE (ALPHAGAN) 0.15 % OPHTHALMIC SOLUTION] Administer 1 drop to both eyes 3 (three) times a day.   calcium carbonate 300 mg (750 mg) Chew, [CALCIUM CARBONATE 300 MG (750 MG) CHEW] Chew 3 tablets 2 (two) times a day as needed.  dorzolamide (TRUSOPT) 2 % ophthalmic solution, [DORZOLAMIDE (TRUSOPT) 2 % OPHTHALMIC SOLUTION] INSTILL 1 DROP INTO BOTH EYES 3 TIMES A DAY  losartan (COZAAR) 50 MG tablet, [LOSARTAN (COZAAR) 50 MG TABLET] Take 1 tablet (50 mg total) by mouth daily.  ONETOUCH ULTRA CONTROL Soln, [ONETOUCH ULTRA CONTROL SOLN] USE 1 EACH AS DIRECTED AS  "NEEDED.  ONETOUCH VERIO TEST STRIPS strips, [ONETOUCH VERIO TEST STRIPS STRIPS] TEST DAILY  tamsulosin (FLOMAX) 0.4 mg Cp24, [TAMSULOSIN (FLOMAX) 0.4 MG CP24] Take 0.4 mg by mouth daily.  aspirin 81 MG EC tablet, [ASPIRIN 81 MG EC TABLET] TAKE 1 TABLET BY MOUTH EVERY DAY (Patient not taking: Reported on 7/9/2021)    No current facility-administered medications on file prior to visit.        PHYSICAL EXAMINATION  VITALS: BP (!) 159/96   Pulse 77   Ht 1.676 m (5' 6\")   Wt 81.6 kg (180 lb)   BMI 29.05 kg/m    GENERAL: Healthy appearing, alert, no acute distress, normal habitus.  CARDIOVASCULAR: Extremities warm and well perfused. Pulses present.   EYES: Funduscopic exam limited.  NEUROLOGICAL:  Patient is awake and oriented to self, place and time.  Attention span is normal.  Memory is grossly intact.  Language is fluent and follows commands appropriately.  Appropriate fund of knowledge. Cranial nerves 2-12 are intact. There is no pronator drift.  Motor exam shows 5/5 strength in all extremities.  Tone is symmetric bilaterally in upper and lower extremities.  Reflexes are symmetric and 2+ in upper extremities and lower extremities. Sensory exam is grossly intact to light touch, pin prick and vibration.  Finger to nose and heel to shin is without dysmetria.  Romberg is negative.  Gait is normal and the patient is able to do tandem walk and walk on toes and heels.      DIAGNOSTICS  MRI  IMPRESSION:  1.  No acute intracranial process.  2.  Cluster of presumed prominent perivascular spaces and adjacent gliosis within the left parietal/periatrial white matter.  3.  Generalized brain atrophy and presumed microvascular ischemic changes as detailed above.    MRI C spine  IMPRESSION:  1.  Mild cervical spondylosis without significant spinal canal stenosis or cord signal abnormality.  2.  At C4-5, moderate right and mild left neural foraminal stenosis. Correlate with right C5 radicular symptoms.    OUTSIDE RECORDS  Outside " referral notes and chart notes were reviewed and pertinent information has been summarized (in addition to the HPI):-Patient was seen in the spine center.  X-ray of the cervical spine was normal.  Patient was thought to have right upper extremity pain in a C6 pattern.  Patient also has headaches not typical for cervicogenic's.  Was referred to neurology for further evaluation.  These are more in the frontal and temporal region.  No other associated symptoms.  Did have MRI brain and C-spine.    A1c 7.2    IMPRESSION/REPORT/PLAN  Neck pain  Episodic tension-type headache, not intractable vs migraine  Medication overuse headaches  Pain of finger of right hand -rule out radiculopathy    This is a 75 year old male with bifrontal dull headache suggestive of tension versus migraine headaches. These are not related to his neck pain. Cervicogenic headaches were more occipital. MRI brain is negative for any structural lesions causing his headaches. He has not tried any preventive medications. He does use Tylenol twice a day which might be causing some medication overuse headaches. Encouraged him to get off the Tylenol and only use it less than 10 times a month. We will further put him on prednisone to help him get through the transition. We will further put him on nortriptyline at nighttime for prevention of headaches. He can use the Tylenol sparingly for abortive therapy.    In terms of his neck pain/right thumb pain and radiating pain from the thumb to the neck. MRI C-spine shows moderate right C5 neuroforaminal stenosis. Symptoms could be related to this or could be atypical carpal tunnel. We will get a EMG to further evaluate. Prednisone use for headaches should also help with this. We will set him up with physical therapy to see if that helps.  He does have diabetes which would be a risk factor for carpal tunnel.  A1c was 7.2    I can see him back in 6 weeks.    -     predniSONE (DELTASONE) 20 MG tablet; Take 3 tablets  (60 mg) by mouth daily for 6 days Take in AM with food.  -     nortriptyline (PAMELOR) 10 MG capsule; Take 1 cap/night and then increase by 1 cap/week to a max of 3 caps/night as needed and tolerated.  -     EMG; Future  -     PHYSICAL THERAPY REFERRAL; Future    Return in about 6 weeks (around 8/20/2021) for In-Clinic Visit, After testing.    Over 61 minutes were spent coordinating the care for the patient on the day of the encounter.  This includes previsit, during visit and post visit activities as documented above.  (Activities include but not inclusive of reviewing chart, reviewing outside records, reviewing labs and imaging study results as well as the images, patient visit time including getting history and exam,  use if applicable, review of test results with the patient and coming up with a plan in a shared model, counseling patient and family, education and answering patient questions, EMR , EMR diagnosis entry and problem list management, medication reconciliation and prescription management if applicable, paperwork if applicable, printing documents and documentation of the visit activities.)  Billing:-4 data points, 4 problem points      Issac Owen MD  Neurologist  [facility]  Tel:- [phone]    This note was dictated using voice recognition software.  Any grammatical or context distortions are unintentional and inherent to the software.

## 2021-07-09 NOTE — LETTER
7/9/2021         RE: Tashi Cuellar  [address] [place] [address] [address]  [facility] [address]        Dear Colleague,    Thank you for referring your patient, Tashi Cuellar, to the [facility] [place]. Please see a copy of my visit note below.    NEUROLOGY OUTPATIENT CONSULT NOTE  Jul 9, 2021     CHIEF COMPLAINT/REASON FOR VISIT/REASON FOR CONSULT  Patient presents with:  Head pain    REASON FOR CONSULTATION- Headaches    REFERRAL SOURCE  Dr. Marga Brown  CC Dr. Marga Brown    HISTORY OF PRESENT ILLNESS  Tashi Cuellar is a 75 year old male seen today for evaluation of headaches. Patient reports that he has been having headaches for several years. He currently takes 1300 mg of Tylenol twice a day which keeps the headaches at bay. He barely has any headaches if he does this all the time. Denies any side effects with the Tylenol. Has not tried any preventive medications or abortive medications in the past. Was prescribed a medication that starts with a N which cause a lot of stomach pain. The headaches are more frontal in nature. The bilateral. Small for dull pain with no photophobia phonophobia or auras. Reports no triggers for his pain    He also complains of some neck pain. There is no significant numbness though does complain of some pain radiating from his right thumb to his neck. There is some pain in his right thumb also. Has had a MRI of the cervical spine which shows possible C5 impingement/neuroforaminal stenosis. Is also being worked up in the spine center. Was referred to neurology for further evaluation.    Previous history is reviewed and this is unchanged.    PAST MEDICAL/SURGICAL HISTORY  History reviewed. No pertinent past medical history.  Patient Active Problem List   Diagnosis     Asthma     Verruca Warts     DM2 (diabetes mellitus, type 2) (H)     Gout     Benign Essential Hypertension     Arteriosclerotic Cardiovascular Disease (ASCVD)     Unspecified glaucoma      Plantar Warts     Plantar fascial fibromatosis of right foot     Cervical Disc Degeneration     Neck Pain     SIRS (systemic inflammatory response syndrome) (H)     Renal colic on right side     Lactic acid acidosis   Significant for asthma, diabetes, gout, hypertension, cataract, blood in the eyes, glaucoma surgery    FAMILY HISTORY  History reviewed. No pertinent family history.  Family history negative for headaches  SOCIAL HISTORY  Social History     Tobacco Use     Smoking status: Current Some Day Smoker     Types: Pipe     Smokeless tobacco: Never Used   Substance Use Topics     Alcohol use: Not Currently     Drug use: Not Currently       SYSTEMS REVIEW  Twelve-system ROS was done and other than the HPI this was negative. Pertinent positives noted in the HPI.    MEDICATIONS  ACETAMINOPHEN PO, Take 1,300 mg by mouth 2 times daily  albuterol (PROAIR HFA) 90 mcg/actuation inhaler, [ALBUTEROL (PROAIR HFA) 90 MCG/ACTUATION INHALER] Inhale 1 puff every 4 (four) hours as needed. Every 4-6 hours as needed.  atorvastatin (LIPITOR) 20 MG tablet, [ATORVASTATIN (LIPITOR) 20 MG TABLET] Take 1 tablet (20 mg total) by mouth daily.  betaxolol (BETOPTIC-S) 0.25 % ophthalmic suspension, [BETAXOLOL (BETOPTIC-S) 0.25 % OPHTHALMIC SUSPENSION] Administer 1 drop to both eyes 2 (two) times a day.   bimatoprost (LUMIGAN) 0.03 % ophthalmic drops, [BIMATOPROST (LUMIGAN) 0.03 % OPHTHALMIC DROPS] Administer 1 drop to both eyes every evening.  blood-glucose meter (ONETOUCH VERIO IQ METER) Misc, [BLOOD-GLUCOSE METER (ONETOUCH VERIO IQ METER) MISC] Use 1 each As Directed as needed.  brimonidine (ALPHAGAN) 0.15 % ophthalmic solution, [BRIMONIDINE (ALPHAGAN) 0.15 % OPHTHALMIC SOLUTION] Administer 1 drop to both eyes 3 (three) times a day.   calcium carbonate 300 mg (750 mg) Chew, [CALCIUM CARBONATE 300 MG (750 MG) CHEW] Chew 3 tablets 2 (two) times a day as needed.  dorzolamide (TRUSOPT) 2 % ophthalmic solution, [DORZOLAMIDE (TRUSOPT) 2 %  "OPHTHALMIC SOLUTION] INSTILL 1 DROP INTO BOTH EYES 3 TIMES A DAY  losartan (COZAAR) 50 MG tablet, [LOSARTAN (COZAAR) 50 MG TABLET] Take 1 tablet (50 mg total) by mouth daily.  ONETOUCH ULTRA CONTROL Soln, [ONETOUCH ULTRA CONTROL SOLN] USE 1 EACH AS DIRECTED AS NEEDED.  ONETOUCH VERIO TEST STRIPS strips, [ONETOUCH VERIO TEST STRIPS STRIPS] TEST DAILY  tamsulosin (FLOMAX) 0.4 mg Cp24, [TAMSULOSIN (FLOMAX) 0.4 MG CP24] Take 0.4 mg by mouth daily.  aspirin 81 MG EC tablet, [ASPIRIN 81 MG EC TABLET] TAKE 1 TABLET BY MOUTH EVERY DAY (Patient not taking: Reported on 7/9/2021)    No current facility-administered medications on file prior to visit.        PHYSICAL EXAMINATION  VITALS: BP (!) 159/96   Pulse 77   Ht 1.676 m (5' 6\")   Wt 81.6 kg (180 lb)   BMI 29.05 kg/m    GENERAL: Healthy appearing, alert, no acute distress, normal habitus.  CARDIOVASCULAR: Extremities warm and well perfused. Pulses present.   EYES: Funduscopic exam limited.  NEUROLOGICAL:  Patient is awake and oriented to self, place and time.  Attention span is normal.  Memory is grossly intact.  Language is fluent and follows commands appropriately.  Appropriate fund of knowledge. Cranial nerves 2-12 are intact. There is no pronator drift.  Motor exam shows 5/5 strength in all extremities.  Tone is symmetric bilaterally in upper and lower extremities.  Reflexes are symmetric and 2+ in upper extremities and lower extremities. Sensory exam is grossly intact to light touch, pin prick and vibration.  Finger to nose and heel to shin is without dysmetria.  Romberg is negative.  Gait is normal and the patient is able to do tandem walk and walk on toes and heels.      DIAGNOSTICS  MRI  IMPRESSION:  1.  No acute intracranial process.  2.  Cluster of presumed prominent perivascular spaces and adjacent gliosis within the left parietal/periatrial white matter.  3.  Generalized brain atrophy and presumed microvascular ischemic changes as detailed above.    MRI C " spine  IMPRESSION:  1.  Mild cervical spondylosis without significant spinal canal stenosis or cord signal abnormality.  2.  At C4-5, moderate right and mild left neural foraminal stenosis. Correlate with right C5 radicular symptoms.    OUTSIDE RECORDS  Outside referral notes and chart notes were reviewed and pertinent information has been summarized (in addition to the HPI):-Patient was seen in the spine center.  X-ray of the cervical spine was normal.  Patient was thought to have right upper extremity pain in a C6 pattern.  Patient also has headaches not typical for cervicogenic's.  Was referred to neurology for further evaluation.  These are more in the frontal and temporal region.  No other associated symptoms.  Did have MRI brain and C-spine.    IMPRESSION/REPORT/PLAN  Neck pain  Episodic tension-type headache, not intractable vs migraine  Medication overuse headaches  Pain of finger of right hand -rule out radiculopathy    This is a 75 year old male with bifrontal dull headache suggestive of tension versus migraine headaches. These are not related to his neck pain. Cervicogenic headaches were more occipital. MRI brain is negative for any structural lesions causing his headaches. He has not tried any preventive medications. He does use Tylenol twice a day which might be causing some medication overuse headaches. Encouraged him to get off the Tylenol and only use it less than 10 times a month. We will further put him on prednisone to help him get through the transition. We will further put him on nortriptyline at nighttime for prevention of headaches. He can use the Tylenol sparingly for abortive therapy.    In terms of his neck pain/right thumb pain and radiating pain from the thumb to the neck. MRI C-spine shows moderate right C5 neuroforaminal stenosis. Symptoms could be related to this or could be atypical carpal tunnel. We will get a EMG to further evaluate. Prednisone use for headaches should also help  with this. We will set him up with physical therapy to see if that helps.    I can see him back in 6 weeks.    -     predniSONE (DELTASONE) 20 MG tablet; Take 3 tablets (60 mg) by mouth daily for 6 days Take in AM with food.  -     nortriptyline (PAMELOR) 10 MG capsule; Take 1 cap/night and then increase by 1 cap/week to a max of 3 caps/night as needed and tolerated.  -     EMG; Future  -     PHYSICAL THERAPY REFERRAL; Future    Return in about 6 weeks (around 8/20/2021) for In-Clinic Visit, After testing.    Over 61 minutes were spent coordinating the care for the patient on the day of the encounter.  This includes previsit, during visit and post visit activities as documented above.  (Activities include but not inclusive of reviewing chart, reviewing outside records, reviewing labs and imaging study results as well as the images, patient visit time including getting history and exam,  use if applicable, review of test results with the patient and coming up with a plan in a shared model, counseling patient and family, education and answering patient questions, EMR , EMR diagnosis entry and problem list management, medication reconciliation and prescription management if applicable, paperwork if applicable, printing documents and documentation of the visit activities.)  Billing:-4 data points, 4 problem points      Issac Owen MD  Neurologist  [facility] [place] [facility]  Tel:- [phone]    This note was dictated using voice recognition software.  Any grammatical or context distortions are unintentional and inherent to the software.        Again, thank you for allowing me to participate in the care of your patient.        Sincerely,        Issac Owen MD

## 2021-07-15 ENCOUNTER — TELEPHONE (OUTPATIENT)
Dept: NEUROLOGY | Facility: CLINIC | Age: 76
End: 2021-07-15
Payer: COMMERCIAL

## 2021-07-15 NOTE — TELEPHONE ENCOUNTER
Spoke with pt. Informed pt, increase in glucose levels are side effects of prednisone. Ok to stop if he is not able to tolerate. Pt will like to stop the medication. He had questions in regards to Nortriptyline and if he should also continue this or stop as well. Encourage pt to continue with the Nortriptyline for a couple weeks and see if this will help with symptoms. He can call us back if he is experiencing side effects or feels like this is not effective. No further questions and verbalizes understanding.   Kaitlynn Banks MA on 7/15/2021 at 2:25 PM

## 2021-07-15 NOTE — TELEPHONE ENCOUNTER
Yes it is a side effect of the medication. The steroids are only for 6 days. If he cannot tolerate it then he can stop anytime.

## 2021-07-15 NOTE — TELEPHONE ENCOUNTER
Patient saw  last week and he put him on prednisone and Pamelor and he is a diabetic and he stated his glucose has gone up since taking them and doesn't know if this is why? Please call him at [phone]

## 2021-07-22 ENCOUNTER — NURSE TRIAGE (OUTPATIENT)
Dept: NURSING | Facility: CLINIC | Age: 76
End: 2021-07-22

## 2021-07-22 NOTE — TELEPHONE ENCOUNTER
He is a diabetic    And lately his glucose monitoring is over 270 and his blood sugar normally run around     He is currently on pain medication - he was on prdnisone    Prednisone was stopped 7/16/2021.    He is not on any medications for the blood sugar    No fever    Has frequent urination    Denies any dizziness or weakness, nausea or vomiting    Denies any breathing issues    patient should be seen     Annita Mcbride RN  [facility] Nurse Advisor  11:19 AM  7/22/2021    COVID 19 Nurse Triage Plan/Patient Instructions    Please be aware that novel coronavirus (COVID-19) may be circulating in the community. If you develop symptoms such as fever, cough, or SOB or if you have concerns about the presence of another infection including coronavirus (COVID-19), please contact your health care provider or visit [email or web address].     Disposition/Instructions    In-Person Visit with provider recommended. Reference Visit Selection Guide.    Thank you for taking steps to prevent the spread of this virus.  o Limit your contact with others.  o Wear a simple mask to cover your cough.  o Wash your hands well and often.    Resources    [facility]: About COVID-19: [email or web address]    CDC: What to Do If You're Sick: www.cdc.gov/coronavirus/2019-ncov/about/steps-when-sick.html    CDC: Ending Home Isolation: www.cdc.gov/coronavirus/2019-ncov/hcp/disposition-in-home-patients.html     CDC: Caring for Someone: www.cdc.gov/coronavirus/2019-ncov/if-you-are-sick/care-for-someone.html     [facility]: Interim Guidance for Hospital Discharge to Home: www.health.[facility].mn.us/diseases/coronavirus/hcp/hospdischarge.pdf    [facility] clinical trials (COVID-19 research studies): clinicalaffairs.[facility]-clinical-trials     Below are the COVID-19 hotlines at the [place] Department of Health ([facility]). Interpreters are available.   o For health questions: Call 253-227-1439 or 1-661.537.8010 (7 a.m. to 7  p.m.)  o For questions about schools and childcare: Call 572-401-1107 or 1-810.164.8391 (7 a.m. to 7 p.m.)                         Reason for Disposition    Blood glucose 240 - 300 mg/dL (13.3 - 16.7 mmol/L)    Additional Information    Negative: Unconscious or difficult to awaken    Negative: Acting confused (e.g., disoriented, slurred speech)    Negative: Very weak (can't stand)    Negative: Sounds like a life-threatening emergency to the triager    Negative: Vomiting and signs of dehydration (e.g., very dry mouth, lightheaded, dark urine)    Negative: Blood glucose > 240 mg/dL (13.3 mmol/L) and rapid breathing    Negative: Blood glucose > 500 mg/dL (27.8 mmol/L)    Negative: Blood glucose > 240 mg/dL (13.3 mmol/L) AND urine ketones moderate-large (or more than 1+)    Negative: Blood glucose > 240 mg/dL (13.3 mmol/L) and blood ketones > 1.4 mmol/L    Negative: Blood glucose > 240 mg/dL (13.3 mmol/L) AND vomiting AND unable to check for ketones (in blood or urine)    Negative: Vomiting lasting > 4 hours    Negative: Patient sounds very sick or weak to the triager    Negative: Fever > 100.4 F (38.0 C)    Negative: Caller has URGENT medication or insulin pump question and triager unable to answer question    Negative: Blood glucose > 400 mg/dL (22.2 mmol/L)    Negative: Blood glucose > 300 mg/dL (16.7 mmol/L) AND two or more times in a row    Negative: Urine ketones moderate - large (or blood ketones > 1.4 mmol/L)    Negative: New-onset diabetes suspected (e.g., frequent urination, weak, weight loss)    Negative: Symptoms of high blood sugar (e.g., frequent urination, weak, weight loss) and not able to test blood glucose    Negative: Patient wants to be seen    Negative: Caller has NON-URGENT medication question about med that PCP prescribed and triager unable to answer question    Protocols used: DIABETES - HIGH BLOOD SUGAR-A-OH

## 2021-07-26 ENCOUNTER — OFFICE VISIT (OUTPATIENT)
Dept: NEUROLOGY | Facility: CLINIC | Age: 76
End: 2021-07-26
Attending: PSYCHIATRY & NEUROLOGY
Payer: COMMERCIAL

## 2021-07-26 DIAGNOSIS — M79.644 PAIN OF FINGER OF RIGHT HAND: ICD-10-CM

## 2021-07-26 PROCEDURE — 95909 NRV CNDJ TST 5-6 STUDIES: CPT | Performed by: PSYCHIATRY & NEUROLOGY

## 2021-07-26 PROCEDURE — 95886 MUSC TEST DONE W/N TEST COMP: CPT | Mod: RT | Performed by: PSYCHIATRY & NEUROLOGY

## 2021-07-26 NOTE — LETTER
7/26/2021         RE: Tashi Cuellar  [address] [place] [address] [address]  [facility] [address]        Dear Colleague,    Thank you for referring your patient, Tashi Cuellar, to the [facility] [place]. Please see a copy of my visit note below.    See procedure note.       Again, thank you for allowing me to participate in the care of your patient.        Sincerely,        Issac Owen MD

## 2021-07-26 NOTE — PROCEDURES
[facility]    Formerly [facility]  [address], [address]  [place] [address]  Tel: [phone]  Fax: [phone]          Full Name: Tashi Cuellar Gender: Male  Patient ID: [id number] YOB: 1945      Visit Date: 7/26/2021 08:35  Age: 75 Years 10 Months Old  Interpreted By: Issac Owen MD   Ref Dr.: Clifton Mccray MD  Tech:    Height: 5 feet 6 inch  Reason for referral: Evaluate right upper. c/o pain in the thumb > 1 year. Diabetic > 10 years.      Motor NCS      Nerve / Sites Lat Amp Dist Obed    ms mV cm m/s   R Median - APB      Wrist 3.33 8.1 7       Elbow 8.13 7.9 25 52   R Ulnar - ADM      Wrist 2.97 12.7 7       B.Elbow 6.51 12.1 21 59      A.Elbow 8.49 11.1 10 51       F  Wave      Nerve Fmin    ms   R Ulnar - ADM 28.49       Sensory NCS      Nerve / Sites Onset Lat Pk Lat Amp.2-3 Dist Obed    ms ms  V cm m/s   R Median - II (Antidr)      Wrist 2.55 3.23 27.5 13 51   R Ulnar - V (Antidr)      Wrist 2.50 3.39 8.7 11 44   R Median, Ulnar - Transcarpal comparison      Median Palm 1.51 2.08 46.4 8 53      Ulnar Palm 1.41 1.93 20.0 8 57       EMG Summary Table     Spontaneous MUAP Rcmt Note   Muscle Fib PSW Fasc IA # Amp Dur PPP Rate Type   R. Brachioradialis None None None N N N N N N N   R. Pronator teres None None None N N N N N N N   R. Biceps brachii None None None N N N N N N N   R. Deltoid None None None N N N N N N N   R. Extensor digitorum communis None None None N N N N N N N   R. Triceps brachii None None None N N N N N N N   R. Flexor carpi ulnaris None None None N N N N N N N   R. First dorsal interosseous None None None N N N N N N N   R. Abductor pollicis brevis None None None N N N N N N N         SUMMARY  Nerve conduction and EMG study of right upper extremity shows:    Normal right median nerve distal motor latency, amplitude and conduction velocity.  Normal right ulnar distal motor latency, amplitude and  conduction velocity.  Normal right median and ulnar sensory SNAPs.  Normal right median-ulnar transcarpal peak latency comparison.  Monopolar needle exam is normal.      CLINICAL INTERPRETATION:  This is a normal nerve conduction and EMG study. Further clinical correlation is needed.     Issac Owen MD  Neurologist  [facility]  Tel:- [phone]

## 2021-08-05 ENCOUNTER — OFFICE VISIT (OUTPATIENT)
Dept: FAMILY MEDICINE | Facility: CLINIC | Age: 76
End: 2021-08-05
Payer: COMMERCIAL

## 2021-08-05 VITALS
HEART RATE: 78 BPM | SYSTOLIC BLOOD PRESSURE: 158 MMHG | OXYGEN SATURATION: 97 % | WEIGHT: 178.2 LBS | BODY MASS INDEX: 28.76 KG/M2 | DIASTOLIC BLOOD PRESSURE: 80 MMHG

## 2021-08-05 DIAGNOSIS — E11.65 TYPE 2 DIABETES MELLITUS WITH HYPERGLYCEMIA, WITHOUT LONG-TERM CURRENT USE OF INSULIN (H): Primary | ICD-10-CM

## 2021-08-05 PROBLEM — I25.10 ARTERIOSCLEROTIC CARDIOVASCULAR DISEASE (ASCVD): Status: ACTIVE | Noted: 2021-08-05

## 2021-08-05 PROCEDURE — 99213 OFFICE O/P EST LOW 20 MIN: CPT | Performed by: FAMILY MEDICINE

## 2021-08-05 RX ORDER — PREDNISOLONE ACETATE 10 MG/ML
SUSPENSION/ DROPS OPHTHALMIC
COMMUNITY
Start: 2021-06-09 | End: 2021-08-10

## 2021-08-05 RX ORDER — LISINOPRIL 10 MG/1
10 TABLET ORAL DAILY
COMMUNITY
Start: 2021-03-29 | End: 2021-08-10

## 2021-08-05 RX ORDER — TIZANIDINE 2 MG/1
TABLET ORAL
COMMUNITY
Start: 2021-03-19 | End: 2021-08-10

## 2021-08-05 RX ORDER — AMLODIPINE BESYLATE 5 MG/1
5 TABLET ORAL DAILY
COMMUNITY
Start: 2020-10-06 | End: 2021-08-10

## 2021-08-05 RX ORDER — BETAXOLOL HYDROCHLORIDE 5 MG/ML
SOLUTION/ DROPS OPHTHALMIC
COMMUNITY
Start: 2021-07-08 | End: 2022-01-20

## 2021-08-05 RX ORDER — CALCIUM CARBONATE 750 MG/1
3 TABLET, CHEWABLE ORAL 2 TIMES DAILY
COMMUNITY
End: 2021-08-10

## 2021-08-05 RX ORDER — NAPROXEN 500 MG/1
TABLET ORAL
COMMUNITY
Start: 2021-04-19 | End: 2021-08-10

## 2021-08-05 NOTE — PROGRESS NOTES
"    Assessment & Plan     Type 2 diabetes mellitus with hyperglycemia, without long-term current use of insulin (H)  Consider Metformin  - Med Therapy Management Referral               Tobacco Cessation:   reports that he has been smoking pipe. He has never used smokeless tobacco.      BMI:   Estimated body mass index is 28.76 kg/m  as calculated from the following:    Height as of 7/9/21: 1.676 m (5' 6\").    Weight as of this encounter: 80.8 kg (178 lb 3.2 oz).           No follow-ups on file.    Clifton Mccray MD  [facility]   Tashi is a 75 year old who presents for the following health issues     HPI Tashi is being seen for follow-up he is reporting some afternoon lethargy.  His weight is stable.  Recent laboratory was reviewed A1c 7.2 blood sugars are spiking a little bit getting up into the 200 range.  Patient is never seen medication management I think it is time for that he needs readjustment of his diet as well as activity in addition to considering consideration of going on Metformin or another medication.  Will work with [facility] to pursue this.  Patient will follow up with Dr. Derrick Geronimo in September.          Review of Systems   Constitutional, HEENT, cardiovascular, pulmonary, gi and gu systems are negative, except as otherwise noted.      Objective    BP (!) 158/80   Pulse 78   Wt 80.8 kg (178 lb 3.2 oz)   SpO2 97%   BMI 28.76 kg/m    Body mass index is 28.76 kg/m .  Physical Exam   GENERAL: healthy, alert and no distress  NECK: no adenopathy, no asymmetry, masses, or scars and thyroid normal to palpation  RESP: lungs clear to auscultation - no rales, rhonchi or wheezes  CV: regular rate and rhythm, normal S1 S2, no S3 or S4, no murmur, click or rub, no peripheral edema and peripheral pulses strong  ABDOMEN: soft, nontender, no hepatosplenomegaly, no masses and bowel sounds normal  MS: no gross musculoskeletal defects noted, no edema                "

## 2021-08-06 NOTE — PATIENT INSTRUCTIONS - HE
Patient Instructions by Derrick Geronimo MD at 5/11/2021  8:00 AM     Author: Derrick Geronimo MD Service: -- Author Type: Physician    Filed: 5/11/2021  8:25 AM Encounter Date: 5/11/2021 Status: Signed    : Derrick Geronimo MD (Physician)         Patient Education     Your Health Risk Assessment indicates you feel you are not in good physical health.    A healthy lifestyle helps keep the body fit and the mind alert. It helps protect you from disease, helps you fight disease, and helps prevent chronic disease (disease that doesn't go away) from getting worse. This is important as you get older and begin to notice twinges in muscles and joints and a decline in the strength and stamina you once took for granted. A healthy lifestyle includes good healthcare, good nutrition, weight control, recreation, and regular exercise. Avoid harmful substances and do what you can to keep safe. Another part of a healthy lifestyle is stay mentally active and socially involved.    Good healthcare     Have a wellness visit every year.     If you have new symptoms, let us know right away. Don't wait until the next checkup.     Take medicines exactly as prescribed and keep your medicines in a safe place. Tell us if your medicine causes problems.   Healthy diet and weight control     Eat 3 or 4 small, nutritious, low-fat, high-fiber meals a day. Include a variety of fruits, vegetables, and whole-grain foods.     Make sure you get enough calcium in your diet. Calcium, vitamin D, and exercise help prevent osteoporosis (bone thinning).     If you live alone, try eating with others when you can. That way you get a good meal and have company while you eat it.     Try to keep a healthy weight. If you eat more calories than your body uses for energy, it will be stored as fat and you will gain weight.     Recreation   Recreation is not limited to sports and team events. It includes any activity that provides relaxation, interest,  enjoyment, and exercise. Recreation provides an outlet for physical, mental, and social energy. It can give a sense of worth and achievement. It can help you stay healthy.       Patient Education   Understanding USDA MyPlate  The USDA (US Department of Agriculture) has guidelines to help you make healthy food choices. These are called MyPlate. MyPlate shows the food groups that make up healthy meals using the image of a place setting. Before you eat, think about the healthiest choices for what to put onto your plate or into your cup or bowl. To learn more about building a healthy plate, visit www.choosemyplate.gov.       The Food Groups    Fruits: Any fruit or 100% fruit juice counts as part of the Fruit Group. Fruits may be fresh, canned, frozen, or dried, and may be whole, cut-up, or pureed. Make half your plate fruits and vegetables.    Vegetables: Any vegetable or 100% vegetable juice counts as a member of the Vegetable Group. Vegetables may be fresh, frozen, canned, or dried. They can be served raw or cooked and may be whole, cut-up, or mashed. Make half your plate fruits and vegetables.     Grains: All foods made from grains are part of the Grains Group. These include wheat, rice, oats, cornmeal, and barley such as bread, pasta, oatmeal, cereal, tortillas, and grits. Grains should be no more than a quarter of your plate. At least half of your grains should be whole grains.    Protein: This group includes meat, poultry, seafood, beans and peas, eggs, processed soy products (like tofu), nuts (including nut butters), and seeds. Make protein choices no more than a quarter of your plate. Meat and poultry choices should be lean or low fat.    Dairy: All fluid milk products and foods made from milk that contain calcium, like yogurt and cheese are part of the Dairy Group. (Foods that have little calcium, such as cream, butter, and cream cheese, are not part of the group.) Most dairy choices should be low-fat or  fat-free.    Oils: These are fats that are liquid at room temperature. They include canola, corn, olive, soybean, and sunflower oil. Foods that are mainly oil include mayonnaise, certain salad dressings, and soft margarines. You should have only 5 to 7 teaspoons of oils a day. You probably already get this much from the food you eat.  Use [employer] to Help Build Your Meals  The SuperTracker can help you plan and track your meals and activity. You can look up individual foods to see or compare their nutritional value. You can get guidelines for what and how much you should eat. You can compare your food choices. And you can assess personal physical activities and see ways you can improve. Go to www.BitRock.gov/[employer]/.    2273-9486 [employer]. [address] [place] [address]. All rights reserved. This information is not intended as a substitute for professional medical care. Always follow your healthcare professional's instructions.           Patient Education   Urinary Incontinence (Male)    Urinary incontinence means not being able to control the release of urine from the bladder.  Causes  Common causes of urinary incontinence in men include:    Infection    Certain medicines    Aging    Poor pelvic muscle tone    Bladder spasms    Obesity    Urinary retention  Nervous system diseases, diabetes, sleep apnea, urinary tract infections, prostate surgery, and pelvic trauma can also cause incontinence. Constipation and smoking have also been identified as risk factors.  Symptoms    Urge incontinence (also called overactive bladder) is a sudden urge to urinate even though there may not be much urine in the bladder. The need to urinate often during the night is common. It is due to bladder spasms.    Stress incontinence is involuntary urine leakage that can occur with sneezing, coughing, and other actions that put stress on the bladder.  Treatment  Treatment of urinary incontinence  depends on the cause. Infections of the bladder are treated with antibiotics. Urinary retention is treated with a bladder catheter.  Home care  Follow these guidelines when caring for yourself at home:    Don't consume foods and drinks that may irritate the bladder. These include drinks containing alcohol, caffeinate, or carbonation; chocolate; and acidic fruits and juices.    Limit fluid intake to 6 to 8 cups a day.    Lose weight if you are overweight. This will reduce your symptoms.    If needed, wear absorbent pads to catch urine. Change pads frequently to maintain hygiene and prevent skin and bladder infections.    Bathe daily to maintain good hygiene.    If an antibiotic was prescribed to treat a bladder infection, be sure to take it until finished, even if you are feeling better before then. This is to make sure your infection has cleared.    If a catheter was left in place, it is important to keep bacteria from getting into the collection bag. Don't disconnect the catheter from the collection bag.    Use a leg band to secure the catheter drainage tube, so it does not pull on the catheter. Drain the collection bag when it becomes full using the drain spout at the bottom of the bag. Don't disconnect the bag from the catheter.    Don't pull on or try to remove a catheter. The catheter must be removed by a healthcare provider.  Follow-up care  Follow up with your healthcare provider, or as advised.  When to seek medical advice  Call your healthcare provider right away if any of these occur:    Fever over 100.4 F (38 C), or as directed by your healthcare provider    Bladder pain or fullness    Abdominal swelling, nausea, or vomiting    Back pain    Weakness, dizziness, or fainting    If a catheter was left in place, return if:  ? Catheter falls out  ? Catheter stops draining for 6 hours  Date Last Reviewed: 10/1/2017    7712-2930 [employer]. [address] [place] [address], [place] [address]. All rights  reserved. This information is not intended as a substitute for professional medical care. Always follow your healthcare professional's instructions.     Patient Education   Your Health Risk Assessment indicates you feel you are not in good emotional health.    Recreation   Recreation is not limited to sports and team events. It includes any activity that provides relaxation, interest, enjoyment, and exercise. Recreation provides an outlet for physical, mental, and social energy. It can give a sense of worth and achievement. It can help you stay healthy.    Mental Exercise and Social Involvement  Mental and emotional health is as important as physical health. Keep in touch with friends and family. Stay as active as possible. Continue to learn and challenge yourself.   Things you can do to stay mentally active are:    Learn something new, like a foreign language or musical instrument.     Play SCRABBLE or do crossword puzzles. If you cannot find people to play these games with you at home, you can play them with others on your computer through the Internet.     Join a games club--anything from card games to chess or checkers or lawn bowling.     Start a new hobby.     Go back to school.     Volunteer.     Read.     Keep up with world events.       Patient Education   Understanding Advance Care Planning  Advance care planning is the process of deciding ones own future medical care. It helps ensure that if you cant speak for yourself, your wishes can still be carried out. The plan is a series of legal documents that note a persons wishes. The documents vary by state. Advance care planning may be done when a person has a serious illness that is expected to get worse. It may be done before major surgery. And it can help you and your family be prepared in case of a major illness or injury. Advance care planning helps with making decisions at these times.       A health care proxy is a person who acts as the voice of a  patient when the patient cant speak for himself or herself. The name of this role varies by state. It may be called a Durable Medical Power of  or Durable Power of  for Healthcare. It may be called an agent, surrogate, or advocate. Or it may be called a representative or decision maker. It is an official duty that is identified by a legal document. The document also varies by state.    Why Is Advance Care Planning Important?  If a person communicates their healthcare wishes:    They will be given medical care that matches their values and goals.    Their family members will not be forced to make decisions in a crisis with no guidance.  Creating a Plan  Making an advance care plan is often done in 3 steps:    Thinking about ones wishes. To create an advance care plan, you should think about what kind of medical treatment you would want if you lose the ability to communicate. Are there any situations in which you would refuse or stop treatment? Are there therapies you would want or not want? And whom do you want to make decisions for you? There are many places to learn more about how to plan for your care. Ask your doctor or  for resources.    Picking a health care proxy. This means choosing a trusted person to speak for you only when you cant speak for yourself. When you cannot make medical decisions, your proxy makes sure the instructions in your advance care plan are followed. A proxy does not make decisions based on his or her own opinions. They must put aside those opinions and values if needed, and carry out your wishes.    Filling out the legal documents. There are several kinds of legal documents for advance care planning. Each one tells health care providers your wishes. The documents may vary by state. They must be signed and may need to be witnessed or notarized. You can cancel or change them whenever you wish. Depending on your state, the documents may include a Healthcare  Proxy form, Living Will, Durable Medical Power of , Advance Directive, or others.  The Familys Role  The best help a family can give is to support their loved ones wishes. Open and honest communication is vital. Family should express any concerns they have about the patients choices while the patient can still make decisions.    7476-0217 [employer]. [address] [place] [address]. All rights reserved. This information is not intended as a substitute for professional medical care. Always follow your healthcare professional's instructions.         Also, [facility] [place] [place] offers a free, downloadable health care directive that allows you to share your treatment choices and personal preferences if you cannot communicate your wishes. It also allows you to appoint another person (called a health care agent) to make health care decisions if you are unable to do so. You can download an advance directive by going here: [email or web address]     Patient Education   Personalized Prevention Plan  You are due for the preventive services outlined below.  Your care team is available to assist you in scheduling these services.  If you have already completed any of these items, please share that information with your care team to update in your medical record.  Health Maintenance Due   Topic Date Due   ? ASTHMA ACTION PLAN  Never done   ? COLORECTAL CANCER SCREENING  Never done   ? ZOSTER VACCINES (2 of 3) 02/15/2012   ? DIABETIC EYE EXAM  10/14/2020

## 2021-08-09 NOTE — PROGRESS NOTES
Medication Therapy Management (MTM) Encounter    ASSESSMENT:                            Type 2 Diabetes: Last A1c was not at goal less than 7%, and I would anticipate that his current A1c will be much higher.  In general his blood sugars are not at goal, we reviewed blood glucose goals today.  We did discuss initiating Metformin and we reviewed the side effects, mechanism of action.  He would like to work on diet and exercise, therefore we will start with lifestyle changes and then if no improvement in a month, would recommend starting Metformin.  I did review that if he would like to start Metformin sooner, then to give me a call.  Would recommend starting with 500 mg once daily and titrating up based off of his home blood glucose readings.  Possible that the prednisone he had in July contributed to his worsening in blood sugar.  Continue checking blood sugars once daily at alternating times.  His last renal function is normal.  I will place a referral for DME to discuss diet further.    Hypertension: Last blood pressure not at goal less than 140/90, encouraged him to check blood pressure at home and bring values to follow-up with Dr. Geronimo.  In addition, we will check his blood pressure next month and consider increasing losartan if needed.    BPH: Patient continues to have some symptoms, but will defer to urology.    Shortness of breath: Patient is using minimally.    Pain: Patient to continue to follow with neurology and use acetaminophen minimally.    GERD: He appears to be using Tums on a daily basis.  Recommended initiating famotidine, but he was not interested at this time.    PLAN:                            1.  Work on lifestyle changes until you see Dr. Geronimo.  If no improvement, start Metformin  mg once daily  2.  Referral to diabetes Ed  3.  Check blood pressure at home    Follow-up: After appointment with Dr Geronimo     SUBJECTIVE/OBJECTIVE:                          Tashi Cuellar is a 75 year  old male called for an initial visit. He was referred to me from Dr. Mccray for diabetes.      Reason for visit: Discuss diabetes.  Medication Adherence/Access: Cost is a concern, has a limited income. [place] some eye drops [place] due to the [place]. He did have a list that he was following on the phone and was familiar with his medications.     Type 2 Diabetes: Currently taking no medication.   Tests BG 1 times daily AM and in the evening alternating days. Reports blood sugars all of a sudden were up. AM is normally lower 150-170s, but in the evening in the 200s. Higher than it was in the past, and he does think it is partially his fault with portion control. Reports in April his BG in the evening was 122 and the following morning was 126.   He has heard of metformin. Reports in the last few years he has been taking Metamucil in the morning which helps him stay regular, but he will hold once in a while due to loose stools.   [place] or [place] but with COVID he stopped. He lives in a condo.   He is not a cook and does frozen meals but that forces his portion control. AllBran with soy milk for breakfast. He would be interested in talking with DME more about food.   Diabetes is in his family (mother and sister).   Last A1c checked 5/11/2021 =7.2%  Last GFR >60 ml/min on 5/11/2021  Hyperglycemia symptoms: tired, thirsty  Microalbumin checked 5/11/2021  Is taking atorvastatin 20 mg daily.  Last lipids checked 5/11/2021  Is not taking aspirin 81 mg. Was getting blood in his eyes per eye clinic it was stopped.     Hypertension: Currently taking losartan 50 mg daily. Reports no longer taking amlodipine. Patient does not monitor BP at home, but has a monitor at home that he does not use.   BP Readings from Last 3 Encounters:   08/05/21 (!) 158/80   07/09/21 (!) 159/96   05/11/21 (!) 154/84       BPH: Currently taking tamsulosin 0.4 mg daily. Last night no nocturia, but some nights 2-3x. Not  always emptying his bladder. Steady stream, no hesitation. Follows with urology in October.     Shortness of breath: Has Albuterol HFA - he uses as needed once a year maybe.     Pain: Was taking APAP 1300 mg BID. Was having pain in the head and was seeing neurology. The head pain is sometimes still there. He is no longer on nortriptyline or tizanidine. He got hallucinations with the medications. He was also on prednisone on 7/9/21.    GERD: Tums as needed but practically every day for acid. He is not on any other medications and is not interested.       Today's Vitals: There were no vitals taken for this visit.     Allergies/ADRs: Reviewed in chart  Past Medical History: Reviewed in chart  Tobacco: He reports that he has been smoking pipe occasionally. He has never used smokeless tobacco.Tobacco Cessation Action Plan:   Information offered: Patient not interested at this time  Alcohol: reviewed in chart      ----------------    I spent 60 minutes with this patient today. All changes were made via collaborative practice agreement with Dr. Mccray. A copy of the visit note was provided to the patient's primary care provider.    The patient was given a summary of these recommendations via MAP.     Tasha Calabrese, Pharm.D., BCACP   Medication Therapy Management Pharmacist   [facility]     Telemedicine Visit Details  Type of service:  Telephone visit  Start Time: 10:36 AM  End Time: 11:36 AM  Originating Location (patient location): Home  Distant Location (provider location):  [facility]     Medication Therapy Recommendations  DM2 (diabetes mellitus, type 2) (H)    Rationale: Untreated condition - Needs additional medication therapy - Indication   Recommendation: Start Medication - metFORMIN 500 MG 24 hr tablet   Status: Declined per Patient

## 2021-08-10 ENCOUNTER — VIRTUAL VISIT (OUTPATIENT)
Dept: PHARMACY | Facility: CLINIC | Age: 76
End: 2021-08-10
Attending: FAMILY MEDICINE
Payer: COMMERCIAL

## 2021-08-10 DIAGNOSIS — I25.10 CARDIOVASCULAR DISEASE: ICD-10-CM

## 2021-08-10 DIAGNOSIS — J45.909 UNCOMPLICATED ASTHMA, UNSPECIFIED ASTHMA SEVERITY, UNSPECIFIED WHETHER PERSISTENT: ICD-10-CM

## 2021-08-10 DIAGNOSIS — R35.1 BENIGN PROSTATIC HYPERPLASIA WITH NOCTURIA: Primary | ICD-10-CM

## 2021-08-10 DIAGNOSIS — E11.65 TYPE 2 DIABETES MELLITUS WITH HYPERGLYCEMIA, WITHOUT LONG-TERM CURRENT USE OF INSULIN (H): ICD-10-CM

## 2021-08-10 DIAGNOSIS — M50.30 DEGENERATION OF CERVICAL INTERVERTEBRAL DISC: ICD-10-CM

## 2021-08-10 DIAGNOSIS — N40.1 BENIGN PROSTATIC HYPERPLASIA WITH NOCTURIA: Primary | ICD-10-CM

## 2021-08-10 DIAGNOSIS — K21.9 GASTROESOPHAGEAL REFLUX DISEASE, UNSPECIFIED WHETHER ESOPHAGITIS PRESENT: ICD-10-CM

## 2021-08-10 PROCEDURE — 99605 MTMS BY PHARM NP 15 MIN: CPT | Performed by: PHARMACIST

## 2021-08-10 PROCEDURE — 99607 MTMS BY PHARM ADDL 15 MIN: CPT | Performed by: PHARMACIST

## 2021-08-10 NOTE — LETTER
"        Date: 2021    Tashi Cuellar  [address] [place] [address] [address]   [facility] [id number]    Dear Mr. Cuellar,    Thank you for talking with me on 8/10/21 about your health and medications. Medicare s MTM (Medication Therapy Management) program helps you understand your medications and use them safely.      This letter includes an action plan (Medication Action Plan) and medication list (Personal Medication List). The action plan has steps you should take to help you get the best results from your medications. The medication list will help you keep track of your medications and how to use them the right way.       Have your action plan and medication list with you when you talk with your doctors, pharmacists, and other healthcare providers in your care team.     Ask your doctors, pharmacists, and other healthcare providers to update the action plan and medication list at every visit.     Take your medication list with you if you go to the hospital or emergency room.     Give a copy of the action plan and medication list to your family or caregivers.     If you want to talk about this letter or any of the papers with it, please call   [phone].We look forward to working with you, your doctors, and other healthcare providers to help you stay healthy through the [facility] MTM program.    Sincerely,  Tasha Calabrese, PharmD    Enclosed: Medication Action Plan and Personal Medication List    MEDICATION ACTION PLAN FOR Tashi Cuellar,  1945     This action plan will help you get the best results from your medications if you:   1. Read \"What we talked about.\"   2. Take the steps listed in the \"What I need to do\" boxes.   3. Fill in \"What I did and when I did it.\"   4. Fill in \"My follow-up plan\" and \"Questions I want to ask.\"     Have this action plan with you when you talk with your doctors, pharmacists, and other healthcare providers in your care team. Share this with your family or caregivers " too.  DATE PREPARED: 2021  What we talked about: What my medicines are for, how to know if my medicines are working, made sure my medicines are safe for me and reviewed how to take my medicines.                                                   What I need to do: Take my medicines every day     What I did and when I did it:                                              My follow-up plan:                 Questions I want to ask:              If you have any questions about your action plan, call Tasha Calabrese PharmD, Phone: [phone] , Monday-Friday 8-4:30pm.           PERSONAL MEDICATION LIST FOR Tashi Cuellar,  1945     This medication list was made for you after we talked. We also used information from your doctor's chart.      Use blank rows to add new medications. Then fill in the dates you started using them.    Cross out medications when you no longer use them. Then write the date and why you stopped using them.    Ask your doctors, pharmacists, and other healthcare providers to update this list at every visit. Keep this list up-to-date with:       Prescription medications    Over the counter drugs     Herbals    Vitamins    Minerals      If you go to the hospital or emergency room, take this list with you. Share this with your family or caregivers too.     DATE PREPARED: 2021  Allergies or side effects: Patient has no known allergies.     Medication:  ACETAMINOPHEN PO      How I use it:  Take 1,300 mg by mouth 2 times daily as needed pain      Why I use it:      Prescriber:  Patient Reported      Date I started using it:       Date I stopped using it:         Why I stopped using it:            Medication:  BETAXOLOL HCL 0.5 % OP SOLN      How I use it:  INSTILL 1 DROP INTO BOTH EYES TWICE A DAY      Why I use it:  Eye health    Prescriber:  Clifton Mccray MD      Date I started using it:       Date I stopped using it:         Why I stopped using it:            Medication:   ONETOUCH VERIO TEST STRIPS strips      How I use it:   Used to TEST DAILY      Why I use it: Diabetes type 2    Prescriber:  Clifton Mccray MD      Date I started using it:       Date I stopped using it:         Why I stopped using it:            Medication:  albuterol (PROAIR HFA) 90 mcg/actuation inhaler      How I use it:  Inhale 1 puff every 4 (four) hours as needed. Every 4-6 hours as needed.      Why I use it: Mild intermittent asthma     Prescriber:  Derrick Geronimo MD      Date I started using it:       Date I stopped using it:         Why I stopped using it:            Medication:  atorvastatin (LIPITOR) 20 MG tablet      How I use it:  Take 1 tablet (20 mg total) by mouth daily.      Why I use it: Type 2 diabetes     Prescriber:  Derrick Geronimo MD      Date I started using it:       Date I stopped using it:         Why I stopped using it:            Medication:  bimatoprost (LUMIGAN) 0.03 % ophthalmic drops      How I use it:  Administer 1 drop to both eyes every evening.      Why I use it:  Eye Health    Prescriber:  Clifton Mccray MD      Date I started using it:       Date I stopped using it:         Why I stopped using it:            Medication:  blood-glucose meter (ONETOUCH VERIO IQ METER) Misc      How I use it:  Use 1 each As Directed as needed.      Why I use it: Diabetes mellitus, type 2    Prescriber:  Clifton Mccray MD      Date I started using it:       Date I stopped using it:         Why I stopped using it:            Medication:  brimonidine (ALPHAGAN) 0.15 % ophthalmic solution      How I use it:  Administer 1 drop to both eyes 3 (three) times a day.       Why I use it:  Eye Health    Prescriber:  Clifton Mccray MD      Date I started using it:       Date I stopped using it:         Why I stopped using it:            Medication:  calcium carbonate 300 mg (750 mg) Chew      How I use it:  Chew 3 tablets 2 (two) times a day as needed.      Why I use it:  Heartburn     Prescriber:  Clifton CHAMBERS  MD Mahendra      Date I started using it:       Date I stopped using it:         Why I stopped using it:            Medication:  dorzolamide (TRUSOPT) 2 % ophthalmic solution      How I use it:   INSTILL 1 DROP INTO BOTH EYES 3 TIMES A DAY      Why I use it:  Eye Health    Prescriber:  Clifton Mccray MD      Date I started using it:       Date I stopped using it:         Why I stopped using it:            Medication:  losartan (COZAAR) 50 MG tablet      How I use it:  Take 1 tablet (50 mg total) by mouth daily.      Why I use it:  Blood pressure    Prescriber:  Derrick Geronimo MD      Date I started using it:       Date I stopped using it:         Why I stopped using it:            Medication:  tamsulosin (FLOMAX) 0.4 mg Cp24      How I use it:  Take 0.4 mg by mouth daily.      Why I use it:  Prostate    Prescriber:  Clifton Mccray MD      Date I started using it:       Date I stopped using it:         Why I stopped using it:            Medication:         How I use it:         Why I use it:      Prescriber:         Date I started using it:       Date I stopped using it:         Why I stopped using it:            Medication:         How I use it:         Why I use it:      Prescriber:         Date I started using it:       Date I stopped using it:         Why I stopped using it:            Medication:         How I use it:         Why I use it:      Prescriber:         Date I started using it:       Date I stopped using it:         Why I stopped using it:              Other Information:     If you have any questions about your medication list, call Tasha Calabrese PharmD, Phone: [phone] , Monday-Friday 8-4:30pm.    According to the Paperwork Reduction Act of 1995, no persons are required to respond to a collection of information unless it displays a valid OMB control number. The valid OMB number for this information collection is 5563-1662. The time required to complete this information collection is  estimated to average 40 minutes per response, including the time to review instructions, searching existing data resources, gather the data needed, and complete and review the information collection. If you have any comments concerning the accuracy of the time estimate(s) or suggestions for improving this form, please write to: CMS, Attn: ODELL Reports Clearance Officer, [address] [place] [phone].

## 2021-08-12 ENCOUNTER — HOSPITAL ENCOUNTER (OUTPATIENT)
Dept: PHYSICAL THERAPY | Facility: REHABILITATION | Age: 76
End: 2021-08-12
Attending: PSYCHIATRY & NEUROLOGY
Payer: COMMERCIAL

## 2021-08-12 DIAGNOSIS — G44.219 EPISODIC TENSION-TYPE HEADACHE, NOT INTRACTABLE: ICD-10-CM

## 2021-08-12 DIAGNOSIS — M54.2 NECK PAIN: Primary | ICD-10-CM

## 2021-08-12 DIAGNOSIS — M43.6 STIFFNESS OF NECK: ICD-10-CM

## 2021-08-12 DIAGNOSIS — R29.3 POOR POSTURE: ICD-10-CM

## 2021-08-12 PROCEDURE — 97161 PT EVAL LOW COMPLEX 20 MIN: CPT | Mod: GP | Performed by: PHYSICAL THERAPIST

## 2021-08-12 PROCEDURE — 97110 THERAPEUTIC EXERCISES: CPT | Mod: GP | Performed by: PHYSICAL THERAPIST

## 2021-08-12 PROCEDURE — 97140 MANUAL THERAPY 1/> REGIONS: CPT | Mod: GP | Performed by: PHYSICAL THERAPIST

## 2021-08-12 NOTE — ADDENDUM NOTE
Encounter addended by: Xenia Campos, PT on: 8/12/2021 12:00 PM   Actions taken: Document created, Document edited

## 2021-08-12 NOTE — PROGRESS NOTES
[facility]          OUTPATIENT PHYSICAL THERAPY ORTHOPEDIC EVALUATION  PLAN OF TREATMENT FOR OUTPATIENT REHABILITATION  (COMPLETE FOR INITIAL CLAIMS ONLY)  Patient's Last Name, First Name, M.I.  YOB: 1945  PoloTashi WEST    Provider s Name:  [facility]   Medical Record No.  [id number]   Start of Care Date:  08/12/21   Onset Date:  03/01/21   Type:     _X__PT   ___OT   ___SLP Medical Diagnosis:  M54.2 (ICD-10-CM) - Neck pain; G44.219 (ICD-10-CM) - Episodic tension-type headache, not intractable     PT Diagnosis:  Neck pain and headaches secondary to poor posture   Visits from SOC:  1      _________________________________________________________________________________  Plan of Treatment/Functional Goals:  joint mobilization, manual therapy, neuromuscular re-education, ROM, strengthening, stretching           Goals  Goal Identifier: Self-management/HEP  Goal Description: Patient will be independent in self-management of condition and HEP.  Target Date: 10/11/21    Goal Identifier: Turning head  Goal Description: Patient will demonstrate functional cervical ROM without pain or discomfort in the neck or head.  Target Date: 10/26/21    Goal Identifier: Scissors  Goal Description: Patient will be able to use scissors without pain in the thumb.  Target Date: 10/26/21    Goal Identifier: Headaches  Goal Description: Patient will report no headache symptoms.  Target Date: 10/26/21         Therapy Frequency:  1 time/week  Predicted Duration of Therapy Intervention:  6-12 weeks    Xenia Campos, PT, DPT, MHA                 I CERTIFY THE NEED FOR THESE SERVICES FURNISHED UNDER        THIS PLAN OF TREATMENT AND WHILE UNDER MY CARE     (Physician co-signature of this document indicates review and certification of the therapy plan).                       Certification Date From:  08/12/21   Certification Date To:  11/09/21    Referring  Provider:  Issac Owen MD    Initial Assessment        See Epic Evaluation Start of Care Date: 08/12/21 08/12/21 0800   General Information   Type of Visit Initial OP Ortho PT Evaluation   Start of Care Date 08/12/21   Referring Physician Issac Owen MD   Patient/Family Goals Statement Get rid of head and neck pain   Orders Evaluate and Treat   Date of Order 07/09/21   Certification Required? Yes   Medical Diagnosis M54.2 (ICD-10-CM) - Neck pain; G44.219 (ICD-10-CM) - Episodic tension-type headache, not intractable   Surgical/Medical history reviewed Yes   Precautions/Limitations no known precautions/limitations       Present No   Body Part(s)   Body Part(s) Cervical Spine   Presentation and Etiology   Pertinent history of current problem (include personal factors and/or comorbidities that impact the POC) The patient reports that his neck pain and headaches started at the beginning of this year, possibly after Jan 1st.  He thinks he has had neck pain before and he was on a mm relaxer.  This time, he got a painful headache across the front of his head where he couldn't lie down to sleep.  If he slept in more of an upright position, he could sleep better.  Now, his pain is much better compared to what it was before.  He can still feel something there in the head and the neck.  The R had, he has a problem with.  It has been this way for years, but has recently gotten worse.  The pain travels up the arm from the hand and he'll get pain in the neck and it will be sore.  Tylenol seems to be helping and the neurologist told him he shouldn't be taking that much medication.  He now just takes it as needed.  Headaches seem to be in the forehead only.  There is no numbness or tingling he is noticing.   Impairments A. Pain;N. Headaches   Functional Limitations perform activities of daily living   Symptom Location Neck and head   How/Where did it occur From insidious onset   Onset  date of current episode/exacerbation 03/01/21   Chronicity New   Pain rating (0-10 point scale) Best (/10);Worst (/10)   Best (/10) 1   Worst (/10) 7   Pain quality B. Dull;C. Aching   Frequency of pain/symptoms B. Intermittent   Pain/symptoms are: Worse in the morning   Pain/symptoms exacerbated by G. Certain positions   Pain/symptoms eased by I. OTC medication(s)   Progression of symptoms since onset: Improved   Current / Previous Interventions   Diagnostic Tests: MRI   MRI Results Results   MRI results 1.  Mild cervical spondylosis without significant spinal canal stenosis or cord signal abnormality. 2.  At C4-5, moderate right and mild left neural foraminal stenosis. Correlate with right C5 radicular symptoms.   Current Level of Function   Patient role/employment history F. Retired   Living environment Apartment/condo   Fall Risk Screen   Fall screen completed by PT   Have you fallen 2 or more times in the past year? No   Have you fallen and had an injury in the past year? No   Is patient a fall risk? No   Abuse Screen (yes response referral indicated)   Feels Unsafe at Home or Work/School no   Feels Threatened by Someone no   Does Anyone Try to Keep You From Having Contact with Others or Doing Things Outside Your Home? no   Physical Signs of Abuse Present no   System Outcome Measures   Outcome Measures   (NDI: 2%)   Cervical Spine   Posture Increased thoracic kyphosis, forward head, scapular protraction   Cervical Flexion ROM 31   Cervical Extension ROM 52   Cervical Right Side Bending ROM 15   Cervical Left Side Bending ROM 15   Cervical Right Rotation ROM 71   Cervical Left Rotation ROM 57   Thoracic Flexion ROM WNL   Thoracic Extension ROM Mod limited   Thoracic Right Side Bending ROM Min limited   Thoracic Left Sidebending ROM  Min limited   Thoracic Right Rotation Mod limited   Thoracic Left Rotation Mod limited   Shoulder AROM Screen WFL - limited into flexion and abduction bilaterally L > R   Shoulder  Shrug (C2-C4) Strength 5   Shoulder Abd (C5) Strength 4+   Elbow Flexion (C5, C6) Strength 5   Elbow Extension (C7) Strength 5   Spurling Test Neg   Cervical Distraction Test Neg   Segmental Mobility-Cervical Hypomobile cervical spine centrally and bilateral facet joints; no pain   Palpation No palpatpry tenderness   Planned Therapy Interventions   Planned Therapy Interventions joint mobilization;manual therapy;neuromuscular re-education;ROM;strengthening;stretching   Clinical Impression   Criteria for Skilled Therapeutic Interventions Met yes, treatment indicated   PT Diagnosis Neck pain and headaches secondary to poor posture   Influenced by the following impairments Impaired ROM, strength, posture, and mobility of spine   Functional limitations due to impairments Turning L with full ROM, headaches, using scissors   Clinical Presentation Stable/Uncomplicated   Clinical Decision Making (Complexity) Low complexity   Therapy Frequency 1 time/week   Predicted Duration of Therapy Intervention (days/wks) 6-12 weeks   Risk & Benefits of therapy have been explained Yes   Patient, Family & other staff in agreement with plan of care Yes   Clinical Impression Comments Tashi Cuellar is a 75 year old male who presents to PT with complaints of neck pain and headaches, as well as pain symptoms in his R UE/thumb.  His PMH is positive for arteriosclerotic cardiovascular disease, asthma, type 2 diabetes, and HTN.  Onset of symptoms was the beginning of March 2021 with initial neck pain and inability to lie supine without a headache.  Symptoms have improved since onset, but he continue to have difficulty with turning his head to the L, using a scissors, and intermittent HA symptoms.  He demonstrates impaired ROM, strength, and posture, and is appropriate for skilled PT in order to address the listed impairments and functional limitations.   ORTHO GOALS   PT Ortho Eval Goals 1;2;3;4   Ortho Goal 1   Goal Identifier  Self-management/HEP   Goal Description Patient will be independent in self-management of condition and HEP.   Target Date 10/11/21   Ortho Goal 2   Goal Identifier Turning head   Goal Description Patient will demonstrate functional cervical ROM without pain or discomfort in the neck or head.   Target Date 10/26/21   Ortho Goal 3   Goal Identifier Scissors   Goal Description Patient will be able to use scissors without pain in the thumb.   Target Date 10/26/21   Ortho Goal 4   Goal Identifier Headaches   Goal Description Patient will report no headache symptoms.   Target Date 10/26/21   Total Evaluation Time   PT Eval, Low Complexity Minutes (24883) 30   Therapy Certification   Certification date from 08/12/21   Certification date to 11/09/21   Medical Diagnosis M54.2 (ICD-10-CM) - Neck pain; G44.219 (ICD-10-CM) - Episodic tension-type headache, not intractable     Xenia Campos, PT, DPT, [facility]  8/12/2021

## 2021-08-16 ENCOUNTER — HOSPITAL ENCOUNTER (OUTPATIENT)
Dept: PHYSICAL THERAPY | Facility: REHABILITATION | Age: 76
End: 2021-08-16
Payer: COMMERCIAL

## 2021-08-16 DIAGNOSIS — G44.219 EPISODIC TENSION-TYPE HEADACHE, NOT INTRACTABLE: ICD-10-CM

## 2021-08-16 DIAGNOSIS — R29.3 POOR POSTURE: ICD-10-CM

## 2021-08-16 DIAGNOSIS — M43.6 STIFFNESS OF NECK: ICD-10-CM

## 2021-08-16 DIAGNOSIS — M54.2 NECK PAIN: Primary | ICD-10-CM

## 2021-08-16 PROCEDURE — 97140 MANUAL THERAPY 1/> REGIONS: CPT | Mod: GP | Performed by: PHYSICAL THERAPIST

## 2021-08-16 PROCEDURE — 97110 THERAPEUTIC EXERCISES: CPT | Mod: GP | Performed by: PHYSICAL THERAPIST

## 2021-08-26 ENCOUNTER — HOSPITAL ENCOUNTER (OUTPATIENT)
Dept: PHYSICAL THERAPY | Facility: REHABILITATION | Age: 76
End: 2021-08-26
Payer: COMMERCIAL

## 2021-08-26 DIAGNOSIS — M43.6 STIFFNESS OF NECK: ICD-10-CM

## 2021-08-26 DIAGNOSIS — R29.3 POOR POSTURE: ICD-10-CM

## 2021-08-26 DIAGNOSIS — M54.2 NECK PAIN: Primary | ICD-10-CM

## 2021-08-26 DIAGNOSIS — G44.219 EPISODIC TENSION-TYPE HEADACHE, NOT INTRACTABLE: ICD-10-CM

## 2021-08-26 PROCEDURE — 97140 MANUAL THERAPY 1/> REGIONS: CPT | Mod: GP | Performed by: PHYSICAL THERAPIST

## 2021-08-26 PROCEDURE — 97110 THERAPEUTIC EXERCISES: CPT | Mod: GP | Performed by: PHYSICAL THERAPIST

## 2021-08-27 ENCOUNTER — OFFICE VISIT (OUTPATIENT)
Dept: NEUROLOGY | Facility: CLINIC | Age: 76
End: 2021-08-27
Payer: COMMERCIAL

## 2021-08-27 VITALS
BODY MASS INDEX: 28.61 KG/M2 | SYSTOLIC BLOOD PRESSURE: 158 MMHG | DIASTOLIC BLOOD PRESSURE: 86 MMHG | WEIGHT: 178 LBS | HEIGHT: 66 IN | HEART RATE: 74 BPM

## 2021-08-27 DIAGNOSIS — M54.2 NECK PAIN: ICD-10-CM

## 2021-08-27 DIAGNOSIS — M79.644 PAIN OF FINGER OF RIGHT HAND: Primary | ICD-10-CM

## 2021-08-27 DIAGNOSIS — G44.219 EPISODIC TENSION-TYPE HEADACHE, NOT INTRACTABLE: ICD-10-CM

## 2021-08-27 PROCEDURE — 99214 OFFICE O/P EST MOD 30 MIN: CPT | Performed by: PSYCHIATRY & NEUROLOGY

## 2021-08-27 ASSESSMENT — MIFFLIN-ST. JEOR: SCORE: 1485.15

## 2021-08-27 NOTE — LETTER
8/27/2021         RE: Tashi Cuellar  [address] [place] [address] [address]  [facility] [address]        Dear Colleague,    Thank you for referring your patient, Tashi Cuellar, to the [facility] [place]. Please see a copy of my visit note below.    NEUROLOGY OUTPATIENT PROGRESS NOTE  Aug 27, 2021     CHIEF COMPLAINT/REASON FOR VISIT/REASON FOR CONSULT  Patient presents with:  Extremity Pain: Discuss EMG results     REASON FOR CONSULTATION- Headaches    REFERRAL SOURCE  Dr. Marga Brown  CC Dr. Marga Brown    HISTORY OF PRESENT ILLNESS  Tashi Cuellar is a 75 year old male seen today for evaluation of headaches. Patient reports that he has been having headaches for several years. He currently takes 1300 mg of Tylenol twice a day which keeps the headaches at bay. He barely has any headaches if he does this all the time. Denies any side effects with the Tylenol. Has not tried any preventive medications or abortive medications in the past. Was prescribed a medication that starts with a N which cause a lot of stomach pain. The headaches are more frontal in nature. The bilateral. Small for dull pain with no photophobia phonophobia or auras. Reports no triggers for his pain    He also complains of some neck pain. There is no significant numbness though does complain of some pain radiating from his right thumb to his neck. There is some pain in his right thumb also. Has had a MRI of the cervical spine which shows possible C5 impingement/neuroforaminal stenosis. Is also being worked up in the spine center. Was referred to neurology for further evaluation.    8/27/21  Patient returns today.  Reports that his headaches have completely resolved.  He was overusing the Tylenol which he did stop which caused significant benefit with the headaches.  He was also prescribed some nortriptyline which he is no longer using.  He had side effects with that.  Also had side effects with the steroids.    He  reports that his hand pain is about the same.  He is done physical therapy with no benefit.  EMG came back negative.  Discussed that it might be more nonneurological arthritis pain.  Treatment for that would be Tylenol.  He can use the Tylenol sparingly.  If he uses the Tylenol for his hand it should not cause medication overuse headaches.    Reports no other new issues.    Previous history is reviewed and this is unchanged.    PAST MEDICAL/SURGICAL HISTORY  Past Medical History:   Diagnosis Date     ASCVD (arteriosclerotic cardiovascular disease)      Asthma      Diabetes mellitus, type II (H)      Gallstones      Glaucoma      Gout      HTN (hypertension)      Hydronephrosis of right kidney      Patient Active Problem List   Diagnosis     Asthma     Verruca Warts     DM2 (diabetes mellitus, type 2) (H)     Gout     Benign essential hypertension     HTN (hypertension)     Glaucoma     Plantar warts     Plantar fascial fibromatosis of right foot     Cervical Disc Degeneration     Neck pain     SIRS (systemic inflammatory response syndrome) (H)     Renal colic on right side     Lactic acid acidosis     Arteriosclerotic cardiovascular disease (ASCVD)     Right ureteral stone   Significant for asthma, diabetes, gout, hypertension, cataract, blood in the eyes, glaucoma surgery    FAMILY HISTORY  No family history on file.  Family history negative for headaches  SOCIAL HISTORY  Social History     Tobacco Use     Smoking status: Current Some Day Smoker     Types: Pipe     Smokeless tobacco: Never Used   Substance Use Topics     Alcohol use: Not Currently     Drug use: Not Currently       SYSTEMS REVIEW  Twelve-system ROS was done and other than the HPI this was negative. Pertinent positives noted in the HPI.  No new complaints today.    MEDICATIONS  ACETAMINOPHEN PO, Take 1,300 mg by mouth 2 times daily as needed  atorvastatin (LIPITOR) 20 MG tablet, [ATORVASTATIN (LIPITOR) 20 MG TABLET] Take 1 tablet (20 mg total) by  "mouth daily.  betaxolol (BETOPTIC) 0.5 % ophthalmic solution, INSTILL 1 DROP INTO BOTH EYES TWICE A DAY  bimatoprost (LUMIGAN) 0.03 % ophthalmic drops, [BIMATOPROST (LUMIGAN) 0.03 % OPHTHALMIC DROPS] Administer 1 drop to both eyes every evening.  brimonidine (ALPHAGAN) 0.15 % ophthalmic solution, [BRIMONIDINE (ALPHAGAN) 0.15 % OPHTHALMIC SOLUTION] Administer 1 drop to both eyes 3 (three) times a day.   calcium carbonate 300 mg (750 mg) Chew, [CALCIUM CARBONATE 300 MG (750 MG) CHEW] Chew 3 tablets 2 (two) times a day as needed.  dorzolamide (TRUSOPT) 2 % ophthalmic solution, [DORZOLAMIDE (TRUSOPT) 2 % OPHTHALMIC SOLUTION] INSTILL 1 DROP INTO BOTH EYES 3 TIMES A DAY  FIBER PO,   losartan (COZAAR) 50 MG tablet, [LOSARTAN (COZAAR) 50 MG TABLET] Take 1 tablet (50 mg total) by mouth daily.  tamsulosin (FLOMAX) 0.4 mg Cp24, [TAMSULOSIN (FLOMAX) 0.4 MG CP24] Take 0.4 mg by mouth daily.  albuterol (PROAIR HFA) 90 mcg/actuation inhaler, [ALBUTEROL (PROAIR HFA) 90 MCG/ACTUATION INHALER] Inhale 1 puff every 4 (four) hours as needed. Every 4-6 hours as needed.  aspirin (ASA) 81 MG EC tablet, TAKE 1 TABLET BY MOUTH EVERY DAY (Patient not taking: Reported on 8/27/2021)  blood-glucose meter (ONETOUCH VERIO IQ METER) Misc, [BLOOD-GLUCOSE METER (ONETOUCH VERIO IQ METER) MISC] Use 1 each As Directed as needed.  ONETOUCH VERIO TEST STRIPS strips, [ONETOUCH VERIO TEST STRIPS STRIPS] TEST DAILY    No current facility-administered medications on file prior to visit.       PHYSICAL EXAMINATION  VITALS: BP (!) 158/86 (BP Location: Right arm, Patient Position: Sitting)   Pulse 74   Ht 1.676 m (5' 6\")   Wt 80.7 kg (178 lb)   BMI 28.73 kg/m    GENERAL: Healthy appearing, alert, no acute distress, normal habitus.  CARDIOVASCULAR: Extremities warm and well perfused. Pulses present.   NEUROLOGICAL:  Patient is awake and oriented to self, place and time.  Attention span is normal.  Memory is grossly intact.  Language is fluent and follows " commands appropriately.  Appropriate fund of knowledge. Cranial nerves 2-12 are intact. There is no pronator drift.  Motor exam shows 5/5 strength in all extremities.  Tone is symmetric bilaterally in upper and lower extremities.  (Previously reflexes are symmetric and 2+ in upper extremities and lower extremities. Sensory exam is grossly intact to light touch, pin prick and vibration.)  Finger to nose and heel to shin is without dysmetria.  Romberg is negative.  Gait is normal and the patient is able to do tandem walk and walk on toes and heels.      DIAGNOSTICS  MRI  IMPRESSION:  1.  No acute intracranial process.  2.  Cluster of presumed prominent perivascular spaces and adjacent gliosis within the left parietal/periatrial white matter.  3.  Generalized brain atrophy and presumed microvascular ischemic changes as detailed above.    MRI C spine  IMPRESSION:  1.  Mild cervical spondylosis without significant spinal canal stenosis or cord signal abnormality.  2.  At C4-5, moderate right and mild left neural foraminal stenosis. Correlate with right C5 radicular symptoms.    OUTSIDE RECORDS  Outside referral notes and chart notes were reviewed and pertinent information has been summarized (in addition to the HPI):-Patient was seen in the spine center.  X-ray of the cervical spine was normal.  Patient was thought to have right upper extremity pain in a C6 pattern.  Patient also has headaches not typical for cervicogenic's.  Was referred to neurology for further evaluation.  These are more in the frontal and temporal region.  No other associated symptoms.  Did have MRI brain and C-spine.    A1c 7.2    EMG  CLINICAL INTERPRETATION:  This is a normal nerve conduction and EMG study. Further clinical correlation is needed.     IMPRESSION/REPORT/PLAN  Neck pain  Tension headaches-resolved  Medication overuse headaches-resolved  Pain of finger of right hand -rule out radiculopathy    This is a 75 year old male with bifrontal  dull headache suggestive of tension versus migraine headaches. These are not related to his neck pain. Cervicogenic headaches were more occipital. MRI brain is negative for any structural lesions causing his headaches.  Headaches have improved with stopping the Tylenol.  Most likely these were medication overuse headaches.  He is no longer on the nortriptyline and prednisone that was previously prescribed.  We will continue to monitor for recurrence of these headaches.      In terms of his neck pain/right thumb pain and radiating pain from the thumb to the neck. MRI C-spine shows moderate right C5 neuroforaminal stenosis.  EMG was normal.  Currently is doing physical therapy with some improvement.  Most likely this pain is nonneurological in nature.  Steroids did not help with the symptoms.  Could consider rheumatology consultation.  For right now he can use Tylenol.  Explained to him that if he uses Tylenol for his hand it should not cause medication overuse headaches.    He can follow back in the neurology clinic on a as needed basis.    -     PHYSICAL THERAPY REFERRAL; Future  -     Hold of nortriptyline and steroids.  -     Avoid overuse of Tylenol.    Return if symptoms worsen or fail to improve.    Over 31 minutes were spent coordinating the care for the patient on the day of the encounter.  This includes previsit, during visit and post visit activities as documented above.  Prescription management with multiple complaints answered with the test reviewed with patient.  Discussing differential of thumb pain with the patient.  Discussion of medication overuse headaches and how to properly take the Tylenol.  (Activities include but not inclusive of reviewing chart, reviewing outside records, reviewing labs and imaging study results as well as the images, patient visit time including getting history and exam,  use if applicable, review of test results with the patient and coming up with a plan in a  shared model, counseling patient and family, education and answering patient questions, EMR , EMR diagnosis entry and problem list management, medication reconciliation and prescription management if applicable, paperwork if applicable, printing documents and documentation of the visit activities.)    Issac Owen MD  Neurologist  [facility]  Tel:- [phone]    This note was dictated using voice recognition software.  Any grammatical or context distortions are unintentional and inherent to the software.        Again, thank you for allowing me to participate in the care of your patient.        Sincerely,        Issac Owen MD

## 2021-08-27 NOTE — NURSING NOTE
Chief Complaint   Patient presents with     Extremity Pain     Discuss EMG results      Rosa Isela Denney CMA on 8/27/2021 at 1:01 PM

## 2021-08-27 NOTE — PROGRESS NOTES
NEUROLOGY OUTPATIENT PROGRESS NOTE  Aug 27, 2021     CHIEF COMPLAINT/REASON FOR VISIT/REASON FOR CONSULT  Patient presents with:  Extremity Pain: Discuss EMG results     REASON FOR CONSULTATION- Headaches    REFERRAL SOURCE  Dr. Marga Brown  CC Dr. Marga Brown    HISTORY OF PRESENT ILLNESS  Tashi Cuellar is a 75 year old male seen today for evaluation of headaches. Patient reports that he has been having headaches for several years. He currently takes 1300 mg of Tylenol twice a day which keeps the headaches at bay. He barely has any headaches if he does this all the time. Denies any side effects with the Tylenol. Has not tried any preventive medications or abortive medications in the past. Was prescribed a medication that starts with a N which cause a lot of stomach pain. The headaches are more frontal in nature. The bilateral. Small for dull pain with no photophobia phonophobia or auras. Reports no triggers for his pain    He also complains of some neck pain. There is no significant numbness though does complain of some pain radiating from his right thumb to his neck. There is some pain in his right thumb also. Has had a MRI of the cervical spine which shows possible C5 impingement/neuroforaminal stenosis. Is also being worked up in the spine center. Was referred to neurology for further evaluation.    8/27/21  Patient returns today.  Reports that his headaches have completely resolved.  He was overusing the Tylenol which he did stop which caused significant benefit with the headaches.  He was also prescribed some nortriptyline which he is no longer using.  He had side effects with that.  Also had side effects with the steroids.    He reports that his hand pain is about the same.  He is done physical therapy with no benefit.  EMG came back negative.  Discussed that it might be more nonneurological arthritis pain.  Treatment for that would be Tylenol.  He can use the Tylenol sparingly.  If he uses the Tylenol for  his hand it should not cause medication overuse headaches.    Reports no other new issues.    Previous history is reviewed and this is unchanged.    PAST MEDICAL/SURGICAL HISTORY  Past Medical History:   Diagnosis Date     ASCVD (arteriosclerotic cardiovascular disease)      Asthma      Diabetes mellitus, type II (H)      Gallstones      Glaucoma      Gout      HTN (hypertension)      Hydronephrosis of right kidney      Patient Active Problem List   Diagnosis     Asthma     Verruca Warts     DM2 (diabetes mellitus, type 2) (H)     Gout     Benign essential hypertension     HTN (hypertension)     Glaucoma     Plantar warts     Plantar fascial fibromatosis of right foot     Cervical Disc Degeneration     Neck pain     SIRS (systemic inflammatory response syndrome) (H)     Renal colic on right side     Lactic acid acidosis     Arteriosclerotic cardiovascular disease (ASCVD)     Right ureteral stone   Significant for asthma, diabetes, gout, hypertension, cataract, blood in the eyes, glaucoma surgery    FAMILY HISTORY  No family history on file.  Family history negative for headaches  SOCIAL HISTORY  Social History     Tobacco Use     Smoking status: Current Some Day Smoker     Types: Pipe     Smokeless tobacco: Never Used   Substance Use Topics     Alcohol use: Not Currently     Drug use: Not Currently       SYSTEMS REVIEW  Twelve-system ROS was done and other than the HPI this was negative. Pertinent positives noted in the HPI.  No new complaints today.    MEDICATIONS  ACETAMINOPHEN PO, Take 1,300 mg by mouth 2 times daily as needed  atorvastatin (LIPITOR) 20 MG tablet, [ATORVASTATIN (LIPITOR) 20 MG TABLET] Take 1 tablet (20 mg total) by mouth daily.  betaxolol (BETOPTIC) 0.5 % ophthalmic solution, INSTILL 1 DROP INTO BOTH EYES TWICE A DAY  bimatoprost (LUMIGAN) 0.03 % ophthalmic drops, [BIMATOPROST (LUMIGAN) 0.03 % OPHTHALMIC DROPS] Administer 1 drop to both eyes every evening.  brimonidine (ALPHAGAN) 0.15 %  "ophthalmic solution, [BRIMONIDINE (ALPHAGAN) 0.15 % OPHTHALMIC SOLUTION] Administer 1 drop to both eyes 3 (three) times a day.   calcium carbonate 300 mg (750 mg) Chew, [CALCIUM CARBONATE 300 MG (750 MG) CHEW] Chew 3 tablets 2 (two) times a day as needed.  dorzolamide (TRUSOPT) 2 % ophthalmic solution, [DORZOLAMIDE (TRUSOPT) 2 % OPHTHALMIC SOLUTION] INSTILL 1 DROP INTO BOTH EYES 3 TIMES A DAY  FIBER PO,   losartan (COZAAR) 50 MG tablet, [LOSARTAN (COZAAR) 50 MG TABLET] Take 1 tablet (50 mg total) by mouth daily.  tamsulosin (FLOMAX) 0.4 mg Cp24, [TAMSULOSIN (FLOMAX) 0.4 MG CP24] Take 0.4 mg by mouth daily.  albuterol (PROAIR HFA) 90 mcg/actuation inhaler, [ALBUTEROL (PROAIR HFA) 90 MCG/ACTUATION INHALER] Inhale 1 puff every 4 (four) hours as needed. Every 4-6 hours as needed.  aspirin (ASA) 81 MG EC tablet, TAKE 1 TABLET BY MOUTH EVERY DAY (Patient not taking: Reported on 8/27/2021)  blood-glucose meter (ONETOUCH VERIO IQ METER) Misc, [BLOOD-GLUCOSE METER (ONETOUCH VERIO IQ METER) MISC] Use 1 each As Directed as needed.  ONETOUCH VERIO TEST STRIPS strips, [ONETOUCH VERIO TEST STRIPS STRIPS] TEST DAILY    No current facility-administered medications on file prior to visit.       PHYSICAL EXAMINATION  VITALS: BP (!) 158/86 (BP Location: Right arm, Patient Position: Sitting)   Pulse 74   Ht 1.676 m (5' 6\")   Wt 80.7 kg (178 lb)   BMI 28.73 kg/m    GENERAL: Healthy appearing, alert, no acute distress, normal habitus.  CARDIOVASCULAR: Extremities warm and well perfused. Pulses present.   NEUROLOGICAL:  Patient is awake and oriented to self, place and time.  Attention span is normal.  Memory is grossly intact.  Language is fluent and follows commands appropriately.  Appropriate fund of knowledge. Cranial nerves 2-12 are intact. There is no pronator drift.  Motor exam shows 5/5 strength in all extremities.  Tone is symmetric bilaterally in upper and lower extremities.  (Previously reflexes are symmetric and 2+ in upper " extremities and lower extremities. Sensory exam is grossly intact to light touch, pin prick and vibration.)  Finger to nose and heel to shin is without dysmetria.  Romberg is negative.  Gait is normal and the patient is able to do tandem walk and walk on toes and heels.      DIAGNOSTICS  MRI  IMPRESSION:  1.  No acute intracranial process.  2.  Cluster of presumed prominent perivascular spaces and adjacent gliosis within the left parietal/periatrial white matter.  3.  Generalized brain atrophy and presumed microvascular ischemic changes as detailed above.    MRI C spine  IMPRESSION:  1.  Mild cervical spondylosis without significant spinal canal stenosis or cord signal abnormality.  2.  At C4-5, moderate right and mild left neural foraminal stenosis. Correlate with right C5 radicular symptoms.    OUTSIDE RECORDS  Outside referral notes and chart notes were reviewed and pertinent information has been summarized (in addition to the HPI):-Patient was seen in the spine center.  X-ray of the cervical spine was normal.  Patient was thought to have right upper extremity pain in a C6 pattern.  Patient also has headaches not typical for cervicogenic's.  Was referred to neurology for further evaluation.  These are more in the frontal and temporal region.  No other associated symptoms.  Did have MRI brain and C-spine.    A1c 7.2    EMG  CLINICAL INTERPRETATION:  This is a normal nerve conduction and EMG study. Further clinical correlation is needed.     IMPRESSION/REPORT/PLAN  Neck pain  Tension headaches-resolved  Medication overuse headaches-resolved  Pain of finger of right hand -rule out radiculopathy    This is a 75 year old male with bifrontal dull headache suggestive of tension versus migraine headaches. These are not related to his neck pain. Cervicogenic headaches were more occipital. MRI brain is negative for any structural lesions causing his headaches.  Headaches have improved with stopping the Tylenol.  Most  likely these were medication overuse headaches.  He is no longer on the nortriptyline and prednisone that was previously prescribed.  We will continue to monitor for recurrence of these headaches.      In terms of his neck pain/right thumb pain and radiating pain from the thumb to the neck. MRI C-spine shows moderate right C5 neuroforaminal stenosis.  EMG was normal.  Currently is doing physical therapy with some improvement.  Most likely this pain is nonneurological in nature.  Steroids did not help with the symptoms.  Could consider rheumatology consultation.  For right now he can use Tylenol.  Explained to him that if he uses Tylenol for his hand it should not cause medication overuse headaches.    He can follow back in the neurology clinic on a as needed basis.    -     PHYSICAL THERAPY REFERRAL; Future  -     Hold of nortriptyline and steroids.  -     Avoid overuse of Tylenol.    Return if symptoms worsen or fail to improve.    Over 31 minutes were spent coordinating the care for the patient on the day of the encounter.  This includes previsit, during visit and post visit activities as documented above.  Prescription management with multiple complaints answered with the test reviewed with patient.  Discussing differential of thumb pain with the patient.  Discussion of medication overuse headaches and how to properly take the Tylenol.  (Activities include but not inclusive of reviewing chart, reviewing outside records, reviewing labs and imaging study results as well as the images, patient visit time including getting history and exam,  use if applicable, review of test results with the patient and coming up with a plan in a shared model, counseling patient and family, education and answering patient questions, EMR , EMR diagnosis entry and problem list management, medication reconciliation and prescription management if applicable, paperwork if applicable, printing documents and  documentation of the visit activities.)    Issac Owen MD  Neurologist  [facility] [place] [facility]  Tel:- [phone]    This note was dictated using voice recognition software.  Any grammatical or context distortions are unintentional and inherent to the software.

## 2021-08-31 ENCOUNTER — OFFICE VISIT (OUTPATIENT)
Dept: EDUCATION SERVICES | Facility: CLINIC | Age: 76
End: 2021-08-31
Payer: COMMERCIAL

## 2021-08-31 ENCOUNTER — HOSPITAL ENCOUNTER (OUTPATIENT)
Dept: PHYSICAL THERAPY | Facility: REHABILITATION | Age: 76
End: 2021-08-31
Payer: COMMERCIAL

## 2021-08-31 VITALS — BODY MASS INDEX: 28.79 KG/M2 | WEIGHT: 178.4 LBS

## 2021-08-31 DIAGNOSIS — R29.3 POOR POSTURE: ICD-10-CM

## 2021-08-31 DIAGNOSIS — E11.65 TYPE 2 DIABETES MELLITUS WITH HYPERGLYCEMIA, WITHOUT LONG-TERM CURRENT USE OF INSULIN (H): ICD-10-CM

## 2021-08-31 DIAGNOSIS — G44.219 EPISODIC TENSION-TYPE HEADACHE, NOT INTRACTABLE: ICD-10-CM

## 2021-08-31 DIAGNOSIS — M43.6 STIFFNESS OF NECK: ICD-10-CM

## 2021-08-31 DIAGNOSIS — M54.2 NECK PAIN: Primary | ICD-10-CM

## 2021-08-31 PROCEDURE — 97110 THERAPEUTIC EXERCISES: CPT | Mod: GP | Performed by: PHYSICAL THERAPIST

## 2021-08-31 PROCEDURE — 97140 MANUAL THERAPY 1/> REGIONS: CPT | Mod: GP | Performed by: PHYSICAL THERAPIST

## 2021-08-31 PROCEDURE — G0108 DIAB MANAGE TRN  PER INDIV: HCPCS | Mod: AE

## 2021-08-31 NOTE — Clinical Note
Please call with any questions and/or concerns  Thank You,  Abbi Witt RN, BSN [facility]  Certified Diabetes Care and   [phone]

## 2021-08-31 NOTE — LETTER
8/31/2021         RE: Tashi Cuellar  [address] [place] [address] [address]  [facility] [address]        Dear Colleague,    Thank you for referring your patient, Tashi uCellar, to the [facility]. Please see a copy of my visit note below.    Diabetes Self-Management Education & Support    Presents for: Individual review    Type of Visit: In Person    How would patient like to obtain AVS? Will receive today at end of visit    ASSESSMENT: Tashi is a 75-year-old gentleman who presents today for a diabetes consult and education to assess his diabetes self-management skills with a A1c not at ADA goal of less than 7%.  He arrived today unaccompanied and unfortunately did not bring his meter or logbook with him.  Per his report he has been checking his BG once daily, either a.m. fasting or 2+ hours post dinner.  Tashi states he has had diabetes for greater than 10 years and is it has always been controlled by diet alone.  He has noticed in the last several months he has not been able to maintain a constant BG within target and is here today for assessment and recommendations to help improve his glycemic control.  Says it has been several years since Tashi is received any type of formal education regarding his diabetes management it was agreed to do a full review today on diabetes pathophysiology, medications, BG targets and significance of A1c, and importance and benefits of exercise.  Nutritional counseling included identification of foods that will/will not affect blood sugars, ADA recommendations for CHO intake for both meals and snacks, label reading and the importance of balance and timing with meals.      Patient's most recent   Lab Results   Component Value Date    A1C 7.2 05/11/2021    is not meeting goal of <7.0    Diabetes knowledge and skills assessment:   Patient is knowledgeable in diabetes management concepts related to: Being Active and Healthy Coping    Continue education with  the following diabetes management concepts: Healthy Eating, Monitoring, Problem Solving and Reducing Risks    Based on learning assessment above, most appropriate setting for further diabetes education would be: Individual setting.    INTERVENTIONS:    Education provided today on:  AADE Self-Care Behaviors:  Diabetes Pathophysiology  Healthy Eating: carbohydrate counting, consistency in amount, composition, and timing of food intake, heart healthy diet, portion control and label reading  Being Active: describe appropriate activity program  Monitoring: purpose, individual blood glucose targets and frequency of monitoring  Problem Solving: high blood glucose - causes, signs/symptoms, treatment and prevention and when to call health care provider  Reducing Risks: major complications of diabetes, prevention, early diagnostic measures and treatment of complications and A1C - goals, relating to blood glucose levels, how often to check  Healthy Coping: benefits of making appropriate lifestyle changes, utilize support systems and methods for coping with stress    Opportunities for ongoing education and support in diabetes-self management were discussed. Pt verbalized understanding of concepts discussed and recommendations provided today.       Education Materials Provided:  [employer] Healthy Living with Diabetes Book and My Plate Planner          PLAN: Eat 3 balanced meals each day monitoring CHO intake and following the parameters discussed today from ADA guidelines, work on incorporating 30 minutes of moderate exercise/activity daily, check BG once daily-alternating times between a.m. fasting and 2 hours post dinner.  Tashi has appointment with PCP on 9/17/2021 and scheduled follow-up with PETER VERONICA in first week of November.  Also discussed with Tashi today was the addition of Metformin to his therapeutic regimen.  Explained to him the mechanism of action and expected effects to have on blood glucose.  Did note  "history of lactic acid acidosis in 2016.    Topics to cover at upcoming visits: Healthy Eating, Problem Solving and Reducing Risks  Follow-up: 11/2/2021    See Goals Section for co-developed, patient-stated behavior change goals.  AVS provided to patient today.      SUBJECTIVE / OBJECTIVE:  Presents for: Individual review  Accompanied by: Self  Diabetes education in the past 24 mo: No  Focus of Visit: Patient Unsure  Diabetes type: Type 2  Date of diagnosis: >10 yrs  Disease course: Getting harder to manage  How confident are you filling out medical forms by yourself:: Quite a bit  Transportation concerns: No  Difficulty affording diabetes testing supplies?: Sometimes  Other concerns:: None  Cultural Influences/Ethnic Background:  Not  or       Diabetes Symptoms & Complications:  Fatigue: Sometimes  Neuropathy: No  Polydipsia: No  Polyphagia: No  Polyuria: No  Visual change: No  Slow healing wounds: No  Complications assessed today?: Yes  CVA: Yes  Heart disease: Yes  Nephropathy: Yes  Retinopathy: Yes    Patient Problem List and Family Medical History reviewed for relevant medical history, current medical status, and diabetes risk factors.    Vitals:  Wt 80.9 kg (178 lb 6.4 oz)   BMI 28.79 kg/m    Estimated body mass index is 28.79 kg/m  as calculated from the following:    Height as of 8/27/21: 1.676 m (5' 6\").    Weight as of this encounter: 80.9 kg (178 lb 6.4 oz).   Last 3 BP:   BP Readings from Last 3 Encounters:   08/27/21 (!) 158/86   08/05/21 (!) 158/80   07/09/21 (!) 159/96       History   Smoking Status     Current Some Day Smoker     Types: Pipe   Smokeless Tobacco     Never Used       Labs:  Lab Results   Component Value Date    A1C 7.2 05/11/2021     Lab Results   Component Value Date     05/11/2021     Lab Results   Component Value Date    LDL 64 05/11/2021     02/25/2014     Direct Measure HDL   Date Value Ref Range Status   05/11/2021 43 >=40 mg/dL Final   ]  GFR " "Estimate   Date Value Ref Range Status   05/11/2021 >60 >60 mL/min/1.73m2 Final     GFR Estimate If Black   Date Value Ref Range Status   05/11/2021 >60 >60 mL/min/1.73m2 Final     Lab Results   Component Value Date    CR 0.80 05/11/2021     No results found for: MICROALBUMIN    Healthy Eating:  Healthy Eating Assessed Today: Yes  Cultural/Yarsanism diet restrictions?: No  Meal planning/habits: Smaller portions  How many times a week on average do you eat food made away from home (restaurant/take-out)?: 0  Meals include: Breakfast, Lunch, Dinner, Afternoon Snack  Breakfast: 1 egg, 2 slices toast with margarine- sometimes will mix bell peppers or goat cream cheese in with egg  Lunch: yesterday was chicken and baby brussel sprouts - sometimes a sandwich  Dinner: yesterday same as lunch - typically will include some type of protein and vegetable  Snacks: apple ( will have 1/2 in am and 1/2 in pm, lilliana doone cookies  Other: +sweet tooth - reports today he has \"fallen off the wagon\" for several months now - has difficulties with portion control  Beverages: Water, Milk, Juice, Diet soda (lactose intolerant o drinks soy milk and SF juices)  Has patient met with a dietitian in the past?: No    Being Active:  Being Active Assessed Today: Yes  Exercise:: Yes  How intense was your typical exercise? : Light (like stretching or slow walking) (when weather permits [place], other days he will walk at [facility] (4 laps upstairs & 4 laps downstairs) or [facility]- during COVID exercise decreased dramatically)  Barrier to exercise: None    Monitoring:  Monitoring Assessed Today: Yes  Did patient bring glucose meter to appointment? : No  Blood Glucose Meter: One Touch  Times checking blood sugar at home (number): 1  Times checking blood sugar at home (per): Day  Blood glucose trend: Fluctuating    Glucose data:  Tashi unfortunately did not bring his meter or logbook with him today so " all BG data was solely from his recall alone.  When testing in the a.m. before breakfast blood glucose typically can range 160-200 mg/dL.  When he checks after dinner blood glucose typically in the 150- 160 range.      Current Treatments: Diet    Problem Solving:  Problem Solving Assessed Today: Yes  Is the patient at risk for hypoglycemia?: No  Is the patient at risk for DKA?: No  Does patient have severe weather/disaster plan for diabetes management?: Not Needed  Does patient have sick day plan for diabetes management?: Not Needed     Reducing Risks:  Reducing Risks Assessed Today: Yes  Diabetes Risks: Age over 45 years, Sedentary Lifestyle, Family History  CAD Risks: Diabetes Mellitus, Hypertension  Feet checked by healthcare provider in the last year?: Yes    Healthy Coping:  Healthy Coping Assessed Today: Yes  Emotional response to diabetes: Ready to learn, Acceptance  Stage of change: PREPARATION (Decided to change - considering how)  Support resources: In-person Offerings  Patient Activation Measure Survey Score:  No flowsheet data found.          Thank you,  Abbi Witt RN [facility]  Certified Diabetes Care and   Visit type : DSMT      Time Spent: 60 minutes  Encounter Type: Individual        Any diabetes medication dose changes were made via the Certified Diabetes Care & Education Protocol in collaboration with the patient's primary care provider. A copy of this encounter was shared with the provider    Much or all of the text in this note was generated through the use of the Dragon Dictate voice-to-text software.Errors in spelling or words which seem out of context are unintentional. Sound alike errors, in particular, may have escaped editing.  .

## 2021-08-31 NOTE — PATIENT INSTRUCTIONS
1. Eat 3 balanced meals each day - Monitor carb intake and limit to 60-75 grams per meal  This would be equal to 4-5 choices ~  1 choice = 15 grams    Do not wait longer than 4-5 hours to eat something  Snacks limit to no more than 30 grams of carbohydrates or 2 choices  Make sure you include protein source with each meal and at bedtime - this has been shown to help with blood glucose elevations    2. Check blood sugars once each day - vary the times you are checking to get a more accurate idea of what blood sugars are doing throughout the day  Foe example 3 days a week check when you wake up (fasting) and the other times check 2 hours AFTER a meal   Fasting and before meal target is 80 - 130   2 hours after a meal target is < 180  remember to bring meter and log book to all appointments    3. Incorporate 30 minutes activity into each day - does not need to be all at one time & walking counts    Continue to work on diet changes and increase in activity - If unable to get Blood glucose within targets would recommend starting Metformin as was discussed with your provider    YOU CAN DO THIS !!!

## 2021-08-31 NOTE — PROGRESS NOTES
Diabetes Self-Management Education & Support    Presents for: Individual review    Type of Visit: In Person    How would patient like to obtain AVS? Will receive today at end of visit    ASSESSMENT: Tashi is a 75-year-old gentleman who presents today for a diabetes consult and education to assess his diabetes self-management skills with a A1c not at ADA goal of less than 7%.  He arrived today unaccompanied and unfortunately did not bring his meter or logbook with him.  Per his report he has been checking his BG once daily, either a.m. fasting or 2+ hours post dinner.  Tashi states he has had diabetes for greater than 10 years and is it has always been controlled by diet alone.  He has noticed in the last several months he has not been able to maintain a constant BG within target and is here today for assessment and recommendations to help improve his glycemic control.  Says it has been several years since Tashi is received any type of formal education regarding his diabetes management it was agreed to do a full review today on diabetes pathophysiology, medications, BG targets and significance of A1c, and importance and benefits of exercise.  Nutritional counseling included identification of foods that will/will not affect blood sugars, ADA recommendations for CHO intake for both meals and snacks, label reading and the importance of balance and timing with meals.      Patient's most recent   Lab Results   Component Value Date    A1C 7.2 05/11/2021    is not meeting goal of <7.0    Diabetes knowledge and skills assessment:   Patient is knowledgeable in diabetes management concepts related to: Being Active and Healthy Coping    Continue education with the following diabetes management concepts: Healthy Eating, Monitoring, Problem Solving and Reducing Risks    Based on learning assessment above, most appropriate setting for further diabetes education would be: Individual setting.    INTERVENTIONS:    Education  provided today on:  KIRILL Self-Care Behaviors:  Diabetes Pathophysiology  Healthy Eating: carbohydrate counting, consistency in amount, composition, and timing of food intake, heart healthy diet, portion control and label reading  Being Active: describe appropriate activity program  Monitoring: purpose, individual blood glucose targets and frequency of monitoring  Problem Solving: high blood glucose - causes, signs/symptoms, treatment and prevention and when to call health care provider  Reducing Risks: major complications of diabetes, prevention, early diagnostic measures and treatment of complications and A1C - goals, relating to blood glucose levels, how often to check  Healthy Coping: benefits of making appropriate lifestyle changes, utilize support systems and methods for coping with stress    Opportunities for ongoing education and support in diabetes-self management were discussed. Pt verbalized understanding of concepts discussed and recommendations provided today.       Education Materials Provided:  [employer] Healthy Living with Diabetes Book and My Plate Planner          PLAN: Eat 3 balanced meals each day monitoring CHO intake and following the parameters discussed today from ADA guidelines, work on incorporating 30 minutes of moderate exercise/activity daily, check BG once daily-alternating times between a.m. fasting and 2 hours post dinner.  Tashi has appointment with PCP on 9/17/2021 and scheduled follow-up with CD GLENYS in first week of November.  Also discussed with Tashi today was the addition of Metformin to his therapeutic regimen.  Explained to him the mechanism of action and expected effects to have on blood glucose.  Did note history of lactic acid acidosis in 2016.    Topics to cover at upcoming visits: Healthy Eating, Problem Solving and Reducing Risks  Follow-up: 11/2/2021    See Goals Section for co-developed, patient-stated behavior change goals.  AVS provided to patient  "today.      SUBJECTIVE / OBJECTIVE:  Presents for: Individual review  Accompanied by: Self  Diabetes education in the past 24 mo: No  Focus of Visit: Patient Unsure  Diabetes type: Type 2  Date of diagnosis: >10 yrs  Disease course: Getting harder to manage  How confident are you filling out medical forms by yourself:: Quite a bit  Transportation concerns: No  Difficulty affording diabetes testing supplies?: Sometimes  Other concerns:: None  Cultural Influences/Ethnic Background:  Not  or       Diabetes Symptoms & Complications:  Fatigue: Sometimes  Neuropathy: No  Polydipsia: No  Polyphagia: No  Polyuria: No  Visual change: No  Slow healing wounds: No  Complications assessed today?: Yes  CVA: Yes  Heart disease: Yes  Nephropathy: Yes  Retinopathy: Yes    Patient Problem List and Family Medical History reviewed for relevant medical history, current medical status, and diabetes risk factors.    Vitals:  Wt 80.9 kg (178 lb 6.4 oz)   BMI 28.79 kg/m    Estimated body mass index is 28.79 kg/m  as calculated from the following:    Height as of 8/27/21: 1.676 m (5' 6\").    Weight as of this encounter: 80.9 kg (178 lb 6.4 oz).   Last 3 BP:   BP Readings from Last 3 Encounters:   08/27/21 (!) 158/86   08/05/21 (!) 158/80   07/09/21 (!) 159/96       History   Smoking Status     Current Some Day Smoker     Types: Pipe   Smokeless Tobacco     Never Used       Labs:  Lab Results   Component Value Date    A1C 7.2 05/11/2021     Lab Results   Component Value Date     05/11/2021     Lab Results   Component Value Date    LDL 64 05/11/2021     02/25/2014     Direct Measure HDL   Date Value Ref Range Status   05/11/2021 43 >=40 mg/dL Final   ]  GFR Estimate   Date Value Ref Range Status   05/11/2021 >60 >60 mL/min/1.73m2 Final     GFR Estimate If Black   Date Value Ref Range Status   05/11/2021 >60 >60 mL/min/1.73m2 Final     Lab Results   Component Value Date    CR 0.80 05/11/2021     No results found " "for: MICROALBUMIN    Healthy Eating:  Healthy Eating Assessed Today: Yes  Cultural/Jew diet restrictions?: No  Meal planning/habits: Smaller portions  How many times a week on average do you eat food made away from home (restaurant/take-out)?: 0  Meals include: Breakfast, Lunch, Dinner, Afternoon Snack  Breakfast: 1 egg, 2 slices toast with margarine- sometimes will mix bell peppers or goat cream cheese in with egg  Lunch: yesterday was chicken and baby brussel sprouts - sometimes a sandwich  Dinner: yesterday same as lunch - typically will include some type of protein and vegetable  Snacks: apple ( will have 1/2 in am and 1/2 in pm, lilliana doone cookies  Other: +sweet tooth - reports today he has \"fallen off the wagon\" for several months now - has difficulties with portion control  Beverages: Water, Milk, Juice, Diet soda (lactose intolerant o drinks soy milk and SF juices)  Has patient met with a dietitian in the past?: No    Being Active:  Being Active Assessed Today: Yes  Exercise:: Yes  How intense was your typical exercise? : Light (like stretching or slow walking) (when weather permits he [place], other days he will walk at [facility] (4 laps upstairs & 4 laps downstairs) or [facility]- during COVID exercise decreased dramatically)  Barrier to exercise: None    Monitoring:  Monitoring Assessed Today: Yes  Did patient bring glucose meter to appointment? : No  Blood Glucose Meter: One Touch  Times checking blood sugar at home (number): 1  Times checking blood sugar at home (per): Day  Blood glucose trend: Fluctuating    Glucose data:  Tashi unfortunately did not bring his meter or logbook with him today so all BG data was solely from his recall alone.  When testing in the a.m. before breakfast blood glucose typically can range 160-200 mg/dL.  When he checks after dinner blood glucose typically in the 150- 160 range.      Current Treatments: Diet    Problem " Solving:  Problem Solving Assessed Today: Yes  Is the patient at risk for hypoglycemia?: No  Is the patient at risk for DKA?: No  Does patient have severe weather/disaster plan for diabetes management?: Not Needed  Does patient have sick day plan for diabetes management?: Not Needed     Reducing Risks:  Reducing Risks Assessed Today: Yes  Diabetes Risks: Age over 45 years, Sedentary Lifestyle, Family History  CAD Risks: Diabetes Mellitus, Hypertension  Feet checked by healthcare provider in the last year?: Yes    Healthy Coping:  Healthy Coping Assessed Today: Yes  Emotional response to diabetes: Ready to learn, Acceptance  Stage of change: PREPARATION (Decided to change - considering how)  Support resources: In-person Offerings  Patient Activation Measure Survey Score:  No flowsheet data found.          Thank you,  Abbi Witt RN [facility]  Certified Diabetes Care and   Visit type : DSMT      Time Spent: 60 minutes  Encounter Type: Individual        Any diabetes medication dose changes were made via the Certified Diabetes Care & Education Protocol in collaboration with the patient's primary care provider. A copy of this encounter was shared with the provider    Much or all of the text in this note was generated through the use of the Dragon Dictate voice-to-text software.Errors in spelling or words which seem out of context are unintentional. Sound alike errors, in particular, may have escaped editing.  .

## 2021-09-07 ENCOUNTER — HOSPITAL ENCOUNTER (OUTPATIENT)
Dept: PHYSICAL THERAPY | Facility: REHABILITATION | Age: 76
End: 2021-09-07
Payer: COMMERCIAL

## 2021-09-07 DIAGNOSIS — R29.3 POOR POSTURE: ICD-10-CM

## 2021-09-07 DIAGNOSIS — M43.6 STIFFNESS OF NECK: ICD-10-CM

## 2021-09-07 DIAGNOSIS — M54.2 NECK PAIN: Primary | ICD-10-CM

## 2021-09-07 DIAGNOSIS — G44.219 EPISODIC TENSION-TYPE HEADACHE, NOT INTRACTABLE: ICD-10-CM

## 2021-09-07 PROCEDURE — 97110 THERAPEUTIC EXERCISES: CPT | Mod: GP | Performed by: PHYSICAL THERAPIST

## 2021-09-17 ENCOUNTER — OFFICE VISIT (OUTPATIENT)
Dept: FAMILY MEDICINE | Facility: CLINIC | Age: 76
End: 2021-09-17
Payer: COMMERCIAL

## 2021-09-17 VITALS
OXYGEN SATURATION: 96 % | HEART RATE: 80 BPM | WEIGHT: 177 LBS | DIASTOLIC BLOOD PRESSURE: 80 MMHG | BODY MASS INDEX: 28.57 KG/M2 | SYSTOLIC BLOOD PRESSURE: 120 MMHG

## 2021-09-17 DIAGNOSIS — R53.83 FATIGUE, UNSPECIFIED TYPE: ICD-10-CM

## 2021-09-17 DIAGNOSIS — M54.2 NECK PAIN: ICD-10-CM

## 2021-09-17 DIAGNOSIS — E11.65 TYPE 2 DIABETES MELLITUS WITH HYPERGLYCEMIA, WITHOUT LONG-TERM CURRENT USE OF INSULIN (H): Primary | ICD-10-CM

## 2021-09-17 DIAGNOSIS — J45.20 MILD INTERMITTENT ASTHMA WITHOUT COMPLICATION: ICD-10-CM

## 2021-09-17 DIAGNOSIS — E78.5 HYPERLIPIDEMIA LDL GOAL <100: ICD-10-CM

## 2021-09-17 DIAGNOSIS — I10 ESSENTIAL HYPERTENSION, BENIGN: ICD-10-CM

## 2021-09-17 PROBLEM — N13.8 BENIGN PROSTATIC HYPERPLASIA WITH URINARY OBSTRUCTION: Status: ACTIVE | Noted: 2021-09-01

## 2021-09-17 PROBLEM — N40.1 BENIGN PROSTATIC HYPERPLASIA WITH URINARY OBSTRUCTION: Status: ACTIVE | Noted: 2021-09-01

## 2021-09-17 PROBLEM — R35.1 NOCTURIA: Status: ACTIVE | Noted: 2021-09-01

## 2021-09-17 LAB
ALBUMIN SERPL-MCNC: 4 G/DL (ref 3.5–5)
ALP SERPL-CCNC: 76 U/L (ref 45–120)
ALT SERPL W P-5'-P-CCNC: 16 U/L (ref 0–45)
ANION GAP SERPL CALCULATED.3IONS-SCNC: 9 MMOL/L (ref 5–18)
AST SERPL W P-5'-P-CCNC: 20 U/L (ref 0–40)
BILIRUB SERPL-MCNC: 1 MG/DL (ref 0–1)
BUN SERPL-MCNC: 14 MG/DL (ref 8–28)
CALCIUM SERPL-MCNC: 9.2 MG/DL (ref 8.5–10.5)
CHLORIDE BLD-SCNC: 104 MMOL/L (ref 98–107)
CO2 SERPL-SCNC: 26 MMOL/L (ref 22–31)
CREAT SERPL-MCNC: 0.82 MG/DL (ref 0.7–1.3)
ERYTHROCYTE [DISTWIDTH] IN BLOOD BY AUTOMATED COUNT: 12.6 % (ref 10–15)
GFR SERPL CREATININE-BSD FRML MDRD: 87 ML/MIN/1.73M2
GLUCOSE BLD-MCNC: 149 MG/DL (ref 70–125)
HBA1C MFR BLD: 7.9 % (ref 0–5.6)
HCT VFR BLD AUTO: 45.9 % (ref 40–53)
HGB BLD-MCNC: 15.4 G/DL (ref 13.3–17.7)
MCH RBC QN AUTO: 29.4 PG (ref 26.5–33)
MCHC RBC AUTO-ENTMCNC: 33.6 G/DL (ref 31.5–36.5)
MCV RBC AUTO: 88 FL (ref 78–100)
PLATELET # BLD AUTO: 167 10E3/UL (ref 150–450)
POTASSIUM BLD-SCNC: 4.2 MMOL/L (ref 3.5–5)
PROT SERPL-MCNC: 6.7 G/DL (ref 6–8)
RBC # BLD AUTO: 5.23 10E6/UL (ref 4.4–5.9)
SODIUM SERPL-SCNC: 139 MMOL/L (ref 136–145)
TSH SERPL DL<=0.005 MIU/L-ACNC: 1.32 UIU/ML (ref 0.3–5)
WBC # BLD AUTO: 5.8 10E3/UL (ref 4–11)

## 2021-09-17 PROCEDURE — 99214 OFFICE O/P EST MOD 30 MIN: CPT | Performed by: FAMILY MEDICINE

## 2021-09-17 PROCEDURE — 84443 ASSAY THYROID STIM HORMONE: CPT | Performed by: FAMILY MEDICINE

## 2021-09-17 PROCEDURE — 83036 HEMOGLOBIN GLYCOSYLATED A1C: CPT | Performed by: FAMILY MEDICINE

## 2021-09-17 PROCEDURE — 80053 COMPREHEN METABOLIC PANEL: CPT | Performed by: FAMILY MEDICINE

## 2021-09-17 PROCEDURE — 85027 COMPLETE CBC AUTOMATED: CPT | Performed by: FAMILY MEDICINE

## 2021-09-17 PROCEDURE — 36415 COLL VENOUS BLD VENIPUNCTURE: CPT | Performed by: FAMILY MEDICINE

## 2021-09-17 RX ORDER — ATORVASTATIN CALCIUM 20 MG/1
20 TABLET, FILM COATED ORAL DAILY
Qty: 90 TABLET | Refills: 3 | Status: SHIPPED | OUTPATIENT
Start: 2021-09-17 | End: 2022-09-19

## 2021-09-17 RX ORDER — METFORMIN HCL 500 MG
1000 TABLET, EXTENDED RELEASE 24 HR ORAL
Qty: 180 TABLET | Refills: 3 | Status: SHIPPED | OUTPATIENT
Start: 2021-09-17 | End: 2022-11-14

## 2021-09-17 NOTE — PROGRESS NOTES
Assessment/Plan:    Type 2 diabetes mellitus with hyperglycemia, without long-term current use of insulin (H)  Type 2 diabetes with worsening control.  Diet controlled currently.  Will initiate Metformin extended release 500 mg titrating to 2 tablets with dinner.  Basic metabolic panel to ensure normal renal function.  Reassess no later than 4-month follow-up in office.  Monofilament testing today was normal.  Does have routine eye exams.  Microalbumin screen abnormal May 11, 2021 with microalbumin ratio 45.5 and continues angiotensin receptor blocker for renal protective benefits.  - Hemoglobin A1c  - metFORMIN (GLUCOPHAGE-XR) 500 MG 24 hr tablet  Dispense: 180 tablet; Refill: 3  - Basic metabolic panel  - Comprehensive metabolic panel    Essential hypertension, benign  Hypertension well-controlled on losartan 50 mg daily which was utilized in place of lisinopril due to it described tickle.  - Basic metabolic panel  - Comprehensive metabolic panel    Hyperlipidemia LDL goal <100  Atorvastatin 20 mg daily for lipid management continues with cholesterol results near goal May 11, 2021.  - atorvastatin (LIPITOR) 20 MG tablet  Dispense: 90 tablet; Refill: 3    Mild intermittent asthma without complication  Asthma control test 25 out of 25 but not utilizing albuterol MDI.    Neck pain  Tylenol utilized for neck pain.  Prior evaluation without obvious benefits and will continue avoidance of exacerbating triggers.    Fatigue, unspecified type  Fatigue likely multifactorial.  CBC, TSH and comprehensive metabolic panel obtained today.  Notify with results.  - CBC with platelets  - Comprehensive metabolic panel  - TSH        Subjective:    Tashi Cuellar is seen today for follow-up assessment.  Diet-controlled diabetes noted.  Working with diabetes educator.  Blood sugars have been more elevated since the spring.  Describes levels up to 220 or 240 at times.  Does have underlying history of hypertension now treated with  losartan 50 mg daily.  Lisinopril had caused a described tickle.  Atorvastatin 20 mg daily for lipid management.  Neck pain persists.  Feels that is part of aging.  Fatigue in general.  Can fall asleep easily while sitting.  Headaches have been adequately controlled.  Had Zostavax immunization nearly 10 years ago.  Wondering about Shingrix immunization.  Usually gets high-dose flu shot around  each fall.  Tylenol for neck pain management.  Comprehensive review of systems as above otherwise all negative.    Single   No children   Tobacco:  never   EtOH:  infrequent   Mom -  69 Alzheimer's disease   Dad -  buried alive as part of construction accident  (patient was only 15)   2 older sis - one  and one in nursing home   Surgeries:  none   Hospitalizations:  kidney stone (possibly right side)     Retired:  worked for [employer] company   Hobbies:  nothing especially    Past Surgical History:   Procedure Laterality Date     COMBINED CYSTOSCOPY, INSERT STENT URETER(S) Right 3/27/2016    Procedure: CYSTOSCOPY, RIGHT URETERAL STENT PLACEMENT, RIGHT RETROGRADE PYLOGRAM;  Surgeon: Salvador Mina MD;  Location: [facility];  Service:         History reviewed. No pertinent family history.     Past Medical History:   Diagnosis Date     ASCVD (arteriosclerotic cardiovascular disease)      Asthma      Diabetes mellitus, type II (H)      Gallstones      Glaucoma      Gout      HTN (hypertension)      Hydronephrosis of right kidney         Social History     Tobacco Use     Smoking status: Current Some Day Smoker     Types: Pipe     Smokeless tobacco: Never Used   Substance Use Topics     Alcohol use: Not Currently     Drug use: Not Currently        Current Outpatient Medications   Medication Sig Dispense Refill     ACETAMINOPHEN PO Take 1,300 mg by mouth 2 times daily as needed       albuterol (PROAIR HFA) 90 mcg/actuation inhaler [ALBUTEROL (PROAIR HFA) 90 MCG/ACTUATION INHALER]  Inhale 1 puff every 4 (four) hours as needed. Every 4-6 hours as needed. 1 Inhaler 2     atorvastatin (LIPITOR) 20 MG tablet Take 1 tablet (20 mg) by mouth daily 90 tablet 3     betaxolol (BETOPTIC) 0.5 % ophthalmic solution INSTILL 1 DROP INTO BOTH EYES TWICE A DAY       bimatoprost (LUMIGAN) 0.03 % ophthalmic drops [BIMATOPROST (LUMIGAN) 0.03 % OPHTHALMIC DROPS] Administer 1 drop to both eyes every evening.       blood-glucose meter (ONETOUCH VERIO IQ METER) Misc [BLOOD-GLUCOSE METER (ONETOUCH VERIO IQ METER) MISC] Use 1 each As Directed as needed. 1 each 0     brimonidine (ALPHAGAN) 0.15 % ophthalmic solution [BRIMONIDINE (ALPHAGAN) 0.15 % OPHTHALMIC SOLUTION] Administer 1 drop to both eyes 3 (three) times a day.        calcium carbonate 300 mg (750 mg) Chew [CALCIUM CARBONATE 300 MG (750 MG) CHEW] Chew 3 tablets 2 (two) times a day as needed.       dorzolamide (TRUSOPT) 2 % ophthalmic solution [DORZOLAMIDE (TRUSOPT) 2 % OPHTHALMIC SOLUTION] INSTILL 1 DROP INTO BOTH EYES 3 TIMES A DAY       FIBER PO        losartan (COZAAR) 50 MG tablet [LOSARTAN (COZAAR) 50 MG TABLET] Take 1 tablet (50 mg total) by mouth daily. 90 tablet 3     metFORMIN (GLUCOPHAGE-XR) 500 MG 24 hr tablet Take 2 tablets (1,000 mg) by mouth daily (with dinner) 180 tablet 3     ONETOUCH VERIO TEST STRIPS strips [ONETOUCH VERIO TEST STRIPS STRIPS] TEST DAILY 100 strip 2     tamsulosin (FLOMAX) 0.4 mg Cp24 [TAMSULOSIN (FLOMAX) 0.4 MG CP24] Take 0.4 mg by mouth daily.  3          Objective:    Vitals:    09/17/21 0928   BP: 120/80   Pulse: 80   SpO2: 96%   Weight: 80.3 kg (177 lb)      Body mass index is 28.57 kg/m .    Alert.  No apparent distress.  Chest clear.  Cardiac exam regular.  Monofilament testing is normal.  Excellent dorsalis pedis and posterior tibial pulses.  No ankle edema.      This note has been dictated using voice recognition software and as a result may contain minor grammatical errors and unintended word substitutions.

## 2021-09-21 ENCOUNTER — HOSPITAL ENCOUNTER (OUTPATIENT)
Dept: PHYSICAL THERAPY | Facility: REHABILITATION | Age: 76
End: 2021-09-21
Payer: COMMERCIAL

## 2021-09-21 DIAGNOSIS — M43.6 STIFFNESS OF NECK: ICD-10-CM

## 2021-09-21 DIAGNOSIS — G44.219 EPISODIC TENSION-TYPE HEADACHE, NOT INTRACTABLE: ICD-10-CM

## 2021-09-21 DIAGNOSIS — R29.3 POOR POSTURE: ICD-10-CM

## 2021-09-21 DIAGNOSIS — M54.2 NECK PAIN: Primary | ICD-10-CM

## 2021-09-21 PROCEDURE — 97110 THERAPEUTIC EXERCISES: CPT | Mod: GP | Performed by: PHYSICAL THERAPIST

## 2021-09-24 ENCOUNTER — TELEPHONE (OUTPATIENT)
Dept: PHARMACY | Facility: CLINIC | Age: 76
End: 2021-09-24

## 2021-09-24 NOTE — TELEPHONE ENCOUNTER
Call to follow-up since we last spoke.  His A1c did worsen, therefore he was started on Metformin.  Currently he is taking 1 tablet a day, but he plans on increasing to 2 tablets tomorrow.  Denies any loose stools or diarrhea.  He does take Metamucil.  He has so far not seen any change in his blood sugars.    Encouraged him to increase the Metformin to 2 tablets and closely monitor for loose stools/diarrhea.  He may need to back off on the Metamucil.  Continue to closely monitor blood sugars and he will be seeing diabetes Ed in November.  Will defer to [facility] for follow-up moving forward.

## 2021-10-05 ENCOUNTER — TELEPHONE (OUTPATIENT)
Dept: FAMILY MEDICINE | Facility: CLINIC | Age: 76
End: 2021-10-05

## 2021-10-05 NOTE — TELEPHONE ENCOUNTER
Pt called stating a week prior, he was taking 1 pill of Metformin daily as prescribed by Dr. Geronimo and when the 7 days was over, he started BID as receommended and developed a bad back ache over night so he discontinued taking the medication.     Please advise or send alternative medication if appropriate.

## 2021-12-31 ENCOUNTER — TRANSFERRED RECORDS (OUTPATIENT)
Dept: HEALTH INFORMATION MANAGEMENT | Facility: CLINIC | Age: 76
End: 2021-12-31
Payer: COMMERCIAL

## 2021-12-31 LAB — RETINOPATHY: NEGATIVE

## 2022-01-20 ENCOUNTER — OFFICE VISIT (OUTPATIENT)
Dept: FAMILY MEDICINE | Facility: CLINIC | Age: 77
End: 2022-01-20
Payer: COMMERCIAL

## 2022-01-20 VITALS
DIASTOLIC BLOOD PRESSURE: 80 MMHG | BODY MASS INDEX: 29.21 KG/M2 | HEART RATE: 73 BPM | SYSTOLIC BLOOD PRESSURE: 160 MMHG | OXYGEN SATURATION: 97 % | WEIGHT: 181 LBS

## 2022-01-20 DIAGNOSIS — I10 ESSENTIAL HYPERTENSION, BENIGN: ICD-10-CM

## 2022-01-20 DIAGNOSIS — R04.0 LEFT-SIDED EPISTAXIS: ICD-10-CM

## 2022-01-20 DIAGNOSIS — E78.5 HYPERLIPIDEMIA LDL GOAL <100: ICD-10-CM

## 2022-01-20 DIAGNOSIS — E11.65 TYPE 2 DIABETES MELLITUS WITH HYPERGLYCEMIA, WITHOUT LONG-TERM CURRENT USE OF INSULIN (H): Primary | ICD-10-CM

## 2022-01-20 DIAGNOSIS — R80.9 MICROALBUMINURIA: ICD-10-CM

## 2022-01-20 DIAGNOSIS — R42 DIZZINESS: ICD-10-CM

## 2022-01-20 LAB
ANION GAP SERPL CALCULATED.3IONS-SCNC: 8 MMOL/L (ref 5–18)
BUN SERPL-MCNC: 11 MG/DL (ref 8–28)
CALCIUM SERPL-MCNC: 9.2 MG/DL (ref 8.5–10.5)
CHLORIDE BLD-SCNC: 103 MMOL/L (ref 98–107)
CO2 SERPL-SCNC: 26 MMOL/L (ref 22–31)
CREAT SERPL-MCNC: 0.78 MG/DL (ref 0.7–1.3)
CREAT UR-MCNC: 62 MG/DL
ERYTHROCYTE [DISTWIDTH] IN BLOOD BY AUTOMATED COUNT: 12.9 % (ref 10–15)
GFR SERPL CREATININE-BSD FRML MDRD: >90 ML/MIN/1.73M2
GLUCOSE BLD-MCNC: 142 MG/DL (ref 70–125)
HBA1C MFR BLD: 7.6 % (ref 0–5.6)
HCT VFR BLD AUTO: 46.4 % (ref 40–53)
HGB BLD-MCNC: 15.5 G/DL (ref 13.3–17.7)
HOLD SPECIMEN: NORMAL
MCH RBC QN AUTO: 29.4 PG (ref 26.5–33)
MCHC RBC AUTO-ENTMCNC: 33.4 G/DL (ref 31.5–36.5)
MCV RBC AUTO: 88 FL (ref 78–100)
MICROALBUMIN UR-MCNC: 2.74 MG/DL (ref 0–1.99)
MICROALBUMIN/CREAT UR: 44.2 MG/G CR
PLATELET # BLD AUTO: 159 10E3/UL (ref 150–450)
POTASSIUM BLD-SCNC: 4.2 MMOL/L (ref 3.5–5)
RBC # BLD AUTO: 5.28 10E6/UL (ref 4.4–5.9)
SODIUM SERPL-SCNC: 137 MMOL/L (ref 136–145)
WBC # BLD AUTO: 6.4 10E3/UL (ref 4–11)

## 2022-01-20 PROCEDURE — 85027 COMPLETE CBC AUTOMATED: CPT | Performed by: FAMILY MEDICINE

## 2022-01-20 PROCEDURE — 82043 UR ALBUMIN QUANTITATIVE: CPT | Performed by: FAMILY MEDICINE

## 2022-01-20 PROCEDURE — 36415 COLL VENOUS BLD VENIPUNCTURE: CPT | Performed by: FAMILY MEDICINE

## 2022-01-20 PROCEDURE — 80048 BASIC METABOLIC PNL TOTAL CA: CPT | Performed by: FAMILY MEDICINE

## 2022-01-20 PROCEDURE — 99214 OFFICE O/P EST MOD 30 MIN: CPT | Performed by: FAMILY MEDICINE

## 2022-01-20 PROCEDURE — 83036 HEMOGLOBIN GLYCOSYLATED A1C: CPT | Performed by: FAMILY MEDICINE

## 2022-01-20 RX ORDER — PREDNISOLONE ACETATE 10 MG/ML
SUSPENSION/ DROPS OPHTHALMIC
COMMUNITY
Start: 2021-12-28

## 2022-01-20 RX ORDER — TIMOLOL MALEATE 5 MG/ML
SOLUTION/ DROPS OPHTHALMIC
COMMUNITY
Start: 2022-01-06

## 2022-01-20 NOTE — ADDENDUM NOTE
Encounter addended by: Tremayne Rodrigez, PT on: 1/20/2022 9:10 AM   Actions taken: Episode resolved, Clinical Note Signed

## 2022-01-20 NOTE — PROGRESS NOTES
[facility]    Outpatient Physical Therapy Discharge Note  Patient: Tashi Cuellar  : 1945    Beginning/End Dates of Reporting Period:  21 to 21    Referring Provider: Dr. Issac Owen MD    Therapy Diagnosis: Neck pain and headaches secondary to poor posture     Client Self Report: Doing so so with the neck, noticed more pain yesterday. He is feeling that he is going to cancel his last appointment. Hasn't had the headaches for quite a while.     Objective Measurements:  Objective Measure: Cervical ROM  Details: Cervical ROM Flex: 51 Ext: 43 SB R/L 35/20 Rotation R/L 75/72  Objective Measure: Finklestein's Thumb Test     Goals:  Goal Identifier Self-management/HEP   Goal Description Patient will be independent in self-management of condition and HEP.   Target Date 10/11/21   Date Met  21   Progress (detail required for progress note): Met     Goal Identifier Turning head   Goal Description Patient will demonstrate functional cervical ROM without pain or discomfort in the neck or head.   Target Date 10/26/21   Date Met  21   Progress (detail required for progress note): Improving ROM     Goal Identifier Scissors   Goal Description Patient will be able to use scissors without pain in the thumb.   Target Date 10/26/21   Date Met      Progress (detail required for progress note): Less pain; sometimes no issues with using scissors. Pain in the thumb continues to be variable     Goal Identifier Headaches   Goal Description Patient will report no headache symptoms.   Target Date 10/26/21   Date Met  21   Progress (detail required for progress note): Mild headaches when he gets them     Plan:  Discharge from therapy.    Discharge:    Reason for Discharge: Patient has met all goals.    Equipment Issued: NA    Discharge Plan: Patient to continue home program.

## 2022-01-20 NOTE — LETTER
January 20, 2022      Tashi Cuellar  [address] [place] [address] [address]   [facility] [id number]        Dear ,    We are writing to inform you of your test results.    Continue your current diabetes management as discussed in order to further improve.  Goal A1c < 7.5% initially, < 7.0% ideally.     Your kidney function tests (i.e. Basic Metabolic Panel) were normal.     Your complete blood count results were normal.  No evidence for anemia, etc.     Your urine screen remains mildly abnormal.  Continue your current medication (losartan) as discussed which helps to protect your kidneys.   We will plan to repeat this test in 6-12 months to ensure stable or further improvement.    Resulted Orders   Hemoglobin A1c   Result Value Ref Range    Hemoglobin A1C 7.6 (H) 0.0 - 5.6 %      Comment:      Normal <5.7%   Prediabetes 5.7-6.4%    Diabetes 6.5% or higher     Note: Adopted from ADA consensus guidelines.   Basic metabolic panel   Result Value Ref Range    Sodium 137 136 - 145 mmol/L    Potassium 4.2 3.5 - 5.0 mmol/L    Chloride 103 98 - 107 mmol/L    Carbon Dioxide (CO2) 26 22 - 31 mmol/L    Anion Gap 8 5 - 18 mmol/L    Urea Nitrogen 11 8 - 28 mg/dL    Creatinine 0.78 0.70 - 1.30 mg/dL    Calcium 9.2 8.5 - 10.5 mg/dL    Glucose 142 (H) 70 - 125 mg/dL    GFR Estimate >90 >60 mL/min/1.73m2      Comment:      Effective December 21, 2021 eGFRcr in adults is calculated using the 2021 CKD-EPI creatinine equation which includes age and gender (Javier et al., [employer] [facility], DOI: 10.1056/NLBYtp7908931)   CBC with platelets   Result Value Ref Range    WBC Count 6.4 4.0 - 11.0 10e3/uL    RBC Count 5.28 4.40 - 5.90 10e6/uL    Hemoglobin 15.5 13.3 - 17.7 g/dL    Hematocrit 46.4 40.0 - 53.0 %    MCV 88 78 - 100 fL    MCH 29.4 26.5 - 33.0 pg    MCHC 33.4 31.5 - 36.5 g/dL    RDW 12.9 10.0 - 15.0 %    Platelet Count 159 150 - 450 10e3/uL   Albumin Random Urine Quantitative with Creat Ratio   Result Value Ref Range    Microalbumin Urine  mg/dL 2.74 (H) 0.00 - 1.99 mg/dL    Creatinine Urine mg/dL 62 mg/dL    Microalbumin Urine mg/g Cr 44.2 (H) <=19.9 mg/g Cr    Narrative    Microalbumin, Random Urine   <2.0 mg/dL . . . . . . . . Normal   3.0-30.0 mg/dL . . . . . . Microalbuminuria   >30.0 mg/dL . . . . . .  . Clinical Proteinuria     Microalbumin/Creatinine Ratio, Random Urine   <20 mg/g . . . . .. . . . Normal    mg/g . . . . . . . Microalbuminuria   >300 mg/g . . . . . . . . Clinical Proteinuria       If you have any questions or concerns, please call the clinic at the number listed above.       Sincerely,      Derrick Geronimo MD

## 2022-01-20 NOTE — PROGRESS NOTES
Assessment/Plan:    Type 2 diabetes mellitus with hyperglycemia, without long-term current use of insulin (H)  Type 2 diabetes with slight improvement with A1c decreasing from 7.9% down to 7.6% after utilizing metformin  mg once daily with supper.  Did instruct patient to increase to 2 tablets daily with supper and reassess at annual wellness visit in 4 months.  Microalbumin screen to be updated today.  Continue regular eye exams through [facility].  - Hemoglobin A1c  - Hemoglobin A1c  - Basic metabolic panel  - Basic metabolic panel    Microalbuminuria  Update microalbumin screen today.  Continues losartan 50 mg daily with renal protective benefits anticipated.  - Albumin Random Urine Quantitative with Creat Ratio    Essential hypertension, benign  Blood pressure 130/86 on recheck and continues losartan 50 mg daily.  - Basic metabolic panel  - Basic metabolic panel    Hyperlipidemia LDL goal <100  Recent lipid cascade at goal May 11, 2021.  Continues atorvastatin 20 mg daily.  Reassess at follow-up in 4 months.    Left-sided epistaxis  Left-sided epistaxis.  Petroleum jelly, and nasal saline sprays, humidify environment at home etc.  Check CBC.  - CBC with platelets  - CBC with platelets    Dizziness  Intermittent dizziness.  May last 30 seconds.  Typically when he is out of the house.  Passes on its own.  Did discuss benefits of Holter versus event monitor etc. for further evaluation of cardiac arrhythmia etc.  Patient declines at this time.  Declines stress testing etc.        Subjective:    Tashi Cuellar is seen today for follow-up assessment.  Type 2 diabetes.  Was diet controlled previously however A1c increased from 7.2% up to 7.9% September 17, 2021 we did initiate metformin  mg and using 1 tablet with supper.  Never increased to 2 tablets with supper.  Blood sugars typically still somewhat elevated however blood sugar this morning 135 and typically never over 200 and less dietary  indiscretion with dessert.  Atorvastatin 20 mg daily for lipid management.  Losartan 50 mg daily for hypertension and renal protective benefits with microalbuminuria.  Occasional dizziness when he is out and about.  Can happen when he is at [employer].  May last 30 seconds and then it passes.  Not specific to exertion or chest pain.  No orthopnea or PND.  Eye irritation continues after cataract surgery previously.  Works with [place] [facility].  Asking about potential other eye specialist and I did mention Dr. Williams Olguin.    Single   No children   Tobacco:  never   EtOH:  infrequent   Mom -  69 Alzheimer's disease   Dad -  buried alive as part of construction accident  (patient was only 15)   2 older sis - one  and one in nursing home   Surgeries:  none   Hospitalizations:  kidney stone (possibly right side)     Retired:  worked for [employer]   Hobbies:  nothing especially    Past Surgical History:   Procedure Laterality Date     COMBINED CYSTOSCOPY, INSERT STENT URETER(S) Right 3/27/2016    Procedure: CYSTOSCOPY, RIGHT URETERAL STENT PLACEMENT, RIGHT RETROGRADE PYLOGRAM;  Surgeon: Salvador Mina MD;  Location: [facility];  Service:         No family history on file.     Past Medical History:   Diagnosis Date     ASCVD (arteriosclerotic cardiovascular disease)      Asthma      Diabetes mellitus, type II (H)      Gallstones      Glaucoma      Gout      HTN (hypertension)      Hydronephrosis of right kidney         Social History     Tobacco Use     Smoking status: Current Some Day Smoker     Types: Pipe     Smokeless tobacco: Never Used   Substance Use Topics     Alcohol use: Not Currently     Drug use: Not Currently        Current Outpatient Medications   Medication Sig Dispense Refill     ACETAMINOPHEN PO Take 1,300 mg by mouth 2 times daily as needed       albuterol (PROAIR HFA) 90 mcg/actuation inhaler [ALBUTEROL (PROAIR HFA) 90 MCG/ACTUATION INHALER] Inhale 1  puff every 4 (four) hours as needed. Every 4-6 hours as needed. 1 Inhaler 2     atorvastatin (LIPITOR) 20 MG tablet Take 1 tablet (20 mg) by mouth daily 90 tablet 3     bimatoprost (LUMIGAN) 0.03 % ophthalmic drops [BIMATOPROST (LUMIGAN) 0.03 % OPHTHALMIC DROPS] Administer 1 drop to both eyes every evening.       blood-glucose meter (ONETOUCH VERIO IQ METER) Misc [BLOOD-GLUCOSE METER (ONETOUCH VERIO IQ METER) MISC] Use 1 each As Directed as needed. 1 each 0     brimonidine (ALPHAGAN) 0.15 % ophthalmic solution [BRIMONIDINE (ALPHAGAN) 0.15 % OPHTHALMIC SOLUTION] Administer 1 drop to both eyes 3 (three) times a day.        calcium carbonate 300 mg (750 mg) Chew [CALCIUM CARBONATE 300 MG (750 MG) CHEW] Chew 3 tablets 2 (two) times a day as needed.       dorzolamide (TRUSOPT) 2 % ophthalmic solution [DORZOLAMIDE (TRUSOPT) 2 % OPHTHALMIC SOLUTION] INSTILL 1 DROP INTO BOTH EYES 3 TIMES A DAY       FIBER PO        losartan (COZAAR) 50 MG tablet [LOSARTAN (COZAAR) 50 MG TABLET] Take 1 tablet (50 mg total) by mouth daily. 90 tablet 3     metFORMIN (GLUCOPHAGE-XR) 500 MG 24 hr tablet Take 2 tablets (1,000 mg) by mouth daily (with dinner) 180 tablet 3     ONETOUCH VERIO TEST STRIPS strips [ONETOUCH VERIO TEST STRIPS STRIPS] TEST DAILY 100 strip 2     prednisoLONE acetate (PRED FORTE) 1 % ophthalmic suspension        tamsulosin (FLOMAX) 0.4 mg Cp24 [TAMSULOSIN (FLOMAX) 0.4 MG CP24] Take 0.4 mg by mouth daily.  3     timolol maleate (TIMOPTIC) 0.5 % ophthalmic solution             Objective:    Vitals:    01/20/22 0906   BP: (!) 160/80   Pulse: 73   SpO2: 97%   Weight: 82.1 kg (181 lb)      Body mass index is 29.21 kg/m .    Alert.  No apparent distress.  Left epistaxis previously noted no current bleeding however.  Nasal septum is likely small crust.  Blood pressure 130/86 on recheck otherwise cardiac exam regular without cardiac ectopy.  Chest clear.      This note has been dictated using voice recognition software and as a  result may contain minor grammatical errors and unintended word substitutions.

## 2022-01-25 DIAGNOSIS — E11.9 DM2 (DIABETES MELLITUS, TYPE 2) (H): ICD-10-CM

## 2022-01-26 RX ORDER — BLOOD SUGAR DIAGNOSTIC
STRIP MISCELLANEOUS
Qty: 100 STRIP | Refills: 2 | Status: SHIPPED | OUTPATIENT
Start: 2022-01-26 | End: 2022-11-16

## 2022-01-26 NOTE — TELEPHONE ENCOUNTER
"    Last Written Prescription Date:  5/10/21  Last Fill Quantity: 100,  # refills: 2   Last office visit provider: 9/17/21      Requested Prescriptions   Pending Prescriptions Disp Refills     ONETOUCH VERIO IQ test strip [Pharmacy Med Name: ONE TOUCH VERIO TEST STRIP] 100 strip 2     Sig: TEST DAILY       Diabetic Supplies Protocol Passed - 1/25/2022 12:01 AM        Passed - Medication is active on med list        Passed - Patient is 18 years of age or older        Passed - Recent (6 mo) or future (30 days) visit within the authorizing provider's specialty     Patient had office visit in the last 6 months or has a visit in the next 30 days with authorizing provider.  See \"Patient Info\" tab in inbasket, or \"Choose Columns\" in Meds & Orders section of the refill encounter.                 Velma Harper RN 01/26/22 12:44 PM  "

## 2022-02-08 DIAGNOSIS — I10 ESSENTIAL HYPERTENSION, BENIGN: ICD-10-CM

## 2022-02-10 NOTE — TELEPHONE ENCOUNTER
"Routing refill request to provider for review/approval because:  BP outside parameters.    Last Written Prescription Date:  5/11/2021  Last Fill Quantity: 90,  # refills: 3   Last office visit provider:  1/20/2022     Requested Prescriptions   Pending Prescriptions Disp Refills     losartan (COZAAR) 50 MG tablet [Pharmacy Med Name: LOSARTAN POTASSIUM 50 MG TAB] 90 tablet 3     Sig: TAKE 1 TABLET BY MOUTH EVERY DAY       Angiotensin-II Receptors Failed - 2/8/2022  3:47 PM        Failed - Last blood pressure under 140/90 in past 12 months     BP Readings from Last 3 Encounters:   01/20/22 (!) 160/80   09/17/21 120/80   08/27/21 (!) 158/86                 Passed - Recent (12 mo) or future (30 days) visit within the authorizing provider's specialty     Patient has had an office visit with the authorizing provider or a provider within the authorizing providers department within the previous 12 mos or has a future within next 30 days. See \"Patient Info\" tab in inbasket, or \"Choose Columns\" in Meds & Orders section of the refill encounter.              Passed - Medication is active on med list        Passed - Patient is age 18 or older        Passed - Normal serum creatinine on file in past 12 months     Recent Labs   Lab Test 01/20/22  0914   CR 0.78       Ok to refill medication if creatinine is low          Passed - Normal serum potassium on file in past 12 months     Recent Labs   Lab Test 01/20/22  0914   POTASSIUM 4.2                         Grisel Metz RN 02/10/22 12:56 PM  "

## 2022-02-11 RX ORDER — LOSARTAN POTASSIUM 50 MG/1
TABLET ORAL
Qty: 90 TABLET | Refills: 3 | Status: SHIPPED | OUTPATIENT
Start: 2022-02-11 | End: 2023-01-01

## 2022-05-04 ENCOUNTER — IMMUNIZATION (OUTPATIENT)
Dept: NURSING | Facility: CLINIC | Age: 77
End: 2022-05-04
Payer: COMMERCIAL

## 2022-05-04 PROCEDURE — 0054A COVID-19,PF,PFIZER (12+ YRS): CPT

## 2022-05-04 PROCEDURE — 91305 COVID-19,PF,PFIZER (12+ YRS): CPT

## 2022-05-18 ENCOUNTER — OFFICE VISIT (OUTPATIENT)
Dept: FAMILY MEDICINE | Facility: CLINIC | Age: 77
End: 2022-05-18
Payer: COMMERCIAL

## 2022-05-18 VITALS
WEIGHT: 183.2 LBS | HEIGHT: 66 IN | SYSTOLIC BLOOD PRESSURE: 140 MMHG | TEMPERATURE: 98.6 F | DIASTOLIC BLOOD PRESSURE: 80 MMHG | OXYGEN SATURATION: 98 % | HEART RATE: 70 BPM | BODY MASS INDEX: 29.44 KG/M2

## 2022-05-18 DIAGNOSIS — I10 BENIGN ESSENTIAL HYPERTENSION: ICD-10-CM

## 2022-05-18 DIAGNOSIS — Z00.00 ENCOUNTER FOR MEDICARE ANNUAL WELLNESS EXAM: Primary | ICD-10-CM

## 2022-05-18 DIAGNOSIS — E11.65 TYPE 2 DIABETES MELLITUS WITH HYPERGLYCEMIA, WITHOUT LONG-TERM CURRENT USE OF INSULIN (H): ICD-10-CM

## 2022-05-18 DIAGNOSIS — G62.9 NEUROPATHY: ICD-10-CM

## 2022-05-18 DIAGNOSIS — J45.20 MILD INTERMITTENT ASTHMA WITHOUT COMPLICATION: ICD-10-CM

## 2022-05-18 LAB
ALBUMIN UR-MCNC: NEGATIVE MG/DL
APPEARANCE UR: CLEAR
BILIRUB UR QL STRIP: NEGATIVE
COLOR UR AUTO: YELLOW
GLUCOSE UR STRIP-MCNC: 100 MG/DL
HBA1C MFR BLD: 7.7 % (ref 0–5.6)
HGB UR QL STRIP: NEGATIVE
KETONES UR STRIP-MCNC: NEGATIVE MG/DL
LEUKOCYTE ESTERASE UR QL STRIP: NEGATIVE
NITRATE UR QL: NEGATIVE
PH UR STRIP: 5.5 [PH] (ref 5–8)
SP GR UR STRIP: 1.02 (ref 1–1.03)
UROBILINOGEN UR STRIP-ACNC: 0.2 E.U./DL

## 2022-05-18 PROCEDURE — 81003 URINALYSIS AUTO W/O SCOPE: CPT | Performed by: FAMILY MEDICINE

## 2022-05-18 PROCEDURE — 83036 HEMOGLOBIN GLYCOSYLATED A1C: CPT | Performed by: FAMILY MEDICINE

## 2022-05-18 PROCEDURE — 99397 PER PM REEVAL EST PAT 65+ YR: CPT | Performed by: FAMILY MEDICINE

## 2022-05-18 PROCEDURE — 36415 COLL VENOUS BLD VENIPUNCTURE: CPT | Performed by: FAMILY MEDICINE

## 2022-05-18 RX ORDER — LATANOPROST 50 UG/ML
SOLUTION/ DROPS OPHTHALMIC
COMMUNITY
Start: 2022-03-28

## 2022-05-18 RX ORDER — ALBUTEROL SULFATE 90 UG/1
2 AEROSOL, METERED RESPIRATORY (INHALATION) EVERY 6 HOURS
Qty: 18 G | Refills: 1 | Status: SHIPPED | OUTPATIENT
Start: 2022-05-18

## 2022-05-18 RX ORDER — KETOCONAZOLE 20 MG/G
CREAM TOPICAL
COMMUNITY
Start: 2022-03-25

## 2022-05-18 ASSESSMENT — ACTIVITIES OF DAILY LIVING (ADL): CURRENT_FUNCTION: NO ASSISTANCE NEEDED

## 2022-05-18 NOTE — PATIENT INSTRUCTIONS
Patient Education   Personalized Prevention Plan  You are due for the preventive services outlined below.  Your care team is available to assist you in scheduling these services.  If you have already completed any of these items, please share that information with your care team to update in your medical record.  Health Maintenance Due   Topic Date Due     ANNUAL REVIEW OF HM ORDERS  Never done     Asthma Action Plan - yearly  Never done     LUNG CANCER SCREENING  Never done     Zoster (Shingles) Vaccine (2 of 3) 02/15/2012     Asthma Control Test  07/07/2021     PHQ-2 (once per calendar year)  01/01/2022     Cholesterol Lab  05/11/2022     FALL RISK ASSESSMENT  05/11/2022

## 2022-05-18 NOTE — PROGRESS NOTES
"  Assessment & Plan     Encounter for Medicare annual wellness exam  Medications were reviewed today    Type 2 diabetes mellitus with hyperglycemia, without long-term current use of insulin (H)  Following will be ordered  - Hemoglobin A1c    Benign essential hypertension  No change in medication    Mild intermittent asthma without complication  New prescription for the following  - albuterol (PROAIR HFA/PROVENTIL HFA/VENTOLIN HFA) 108 (90 Base) MCG/ACT inhaler  Dispense: 18 g; Refill: 1    Neuropathy    - Orthopedic  Referral               BMI:   Estimated body mass index is 29.58 kg/m  as calculated from the following:    Height as of this encounter: 1.676 m (5' 5.98\").    Weight as of this encounter: 83.1 kg (183 lb 3.2 oz).           Return in about 53 weeks (around 5/24/2023) for Annual Wellness Visit.    Clifton Mccray MD  [facility] WENDY Akins is a 76 year old who presents for the following health issues patient is here for his diabetes check we will do an A1c and a urine.  His blood sugars run between 130 and 150; patient is having difficulty with his eyes he has had some bleeding since his cataract bilateral phacoemulsification of 2 years ago he is following with  ophthalmic surgery.  He complains of pain in his left foot and ankle consistent with a mild neuropathy.  He could use inserts we will send him off to podiatry for evaluation for orthotics.  Medications were reviewed today he will continue his cholesterol medication.  No change in other medications all medical questions asked were answered.  Pleasure to see him again we will follow along with podiatry.    HPI           Review of Systems   Constitutional, HEENT, cardiovascular, pulmonary, gi and gu systems are negative, except as otherwise noted.      Objective    BP (!) 140/80 (BP Location: Right arm, Patient Position: Sitting, Cuff Size: Adult Regular)   Pulse 70   Temp 98.6  F (37  C) (Oral)   Ht " "1.676 m (5' 5.98\")   Wt 83.1 kg (183 lb 3.2 oz)   SpO2 98%   BMI 29.58 kg/m    Body mass index is 29.58 kg/m .  Physical Exam   GENERAL: healthy, alert and no distress  NECK: no adenopathy, no asymmetry, masses, or scars and thyroid normal to palpation  RESP: lungs clear to auscultation - no rales, rhonchi or wheezes  CV: regular rate and rhythm, normal S1 S2, no S3 or S4, no murmur, click or rub, no peripheral edema and peripheral pulses strong  ABDOMEN: soft, nontender, no hepatosplenomegaly, no masses and bowel sounds normal  MS: no gross musculoskeletal defects noted, no edema        Given the right        "

## 2022-05-26 ENCOUNTER — OFFICE VISIT (OUTPATIENT)
Dept: PODIATRY | Facility: CLINIC | Age: 77
End: 2022-05-26
Attending: FAMILY MEDICINE
Payer: COMMERCIAL

## 2022-05-26 VITALS — HEART RATE: 71 BPM | HEIGHT: 66 IN | OXYGEN SATURATION: 99 % | BODY MASS INDEX: 27.32 KG/M2 | WEIGHT: 170 LBS

## 2022-05-26 DIAGNOSIS — M21.6X1 PRONATION DEFORMITY OF BOTH FEET: Primary | ICD-10-CM

## 2022-05-26 DIAGNOSIS — G62.9 NEUROPATHY: ICD-10-CM

## 2022-05-26 DIAGNOSIS — M21.6X2 PRONATION DEFORMITY OF BOTH FEET: Primary | ICD-10-CM

## 2022-05-26 DIAGNOSIS — E11.9 TYPE 2 DIABETES MELLITUS WITHOUT COMPLICATION, WITH NO HISTORY OF INSULIN USE (H): ICD-10-CM

## 2022-05-26 PROCEDURE — 99203 OFFICE O/P NEW LOW 30 MIN: CPT | Performed by: PODIATRIST

## 2022-05-26 ASSESSMENT — PAIN SCALES - GENERAL: PAINLEVEL: MILD PAIN (2)

## 2022-05-26 NOTE — PATIENT INSTRUCTIONS
What are Prescription Custom Orthotics?  Custom orthotics are specially-made devices designed to support and comfort your feet. Prescription orthotics are crafted for you and no one else. They match the contours of your feet precisely and are designed for the way you move. Orthotics are only manufactured after a podiatrist has conducted a complete evaluation of your feet, ankles, and legs, so the orthotic can accommodate your unique foot structure and pathology.  Prescription orthotics are divided into two categories:  Functional orthotics are designed to control abnormal motion. They may be used to treat foot pain caused by abnormal motion; they can also be used to treat injuries such as shin splints or tendinitis. Functional orthotics are usually crafted of a semi-rigid material such as plastic or graphite.  Accommodative orthotics are softer and meant to provide additional cushioning and support. They can be used to treat diabetic foot ulcers, painful calluses on the bottom of the foot, and other uncomfortable conditions.  Podiatrists use orthotics to treat foot problems such as plantar fasciitis, bursitis, tendinitis, diabetic foot ulcers, and foot, ankle, and heel pain. Clinical research studies have shown that podiatrist-prescribed foot orthotics decrease foot pain and improve function.  Orthotics typically cost more than shoe inserts purchased in a retail store, but the additional cost is usually well worth it. Unlike shoe inserts, orthotics are molded to fit each individual foot, so you can be sure that your orthotics fit and do what they're supposed to do. Prescription orthotics are also made of top-notch materials and last many years when cared for properly. Insurance often helps pay for prescription orthotics.  What are Shoe Inserts?   You've seen them at the grocery store and at the mall. You've probably even seen them on TV and online. Shoe inserts are any kind of non-prescription foot support designed  to be worn inside a shoe. Pre-packaged, mass produced, arch supports are shoe inserts. So are the  custom-made  insoles and foot supports that you can order online or at retail stores. Unless the device has been prescribed by a doctor and crafted for your specific foot, it's a shoe insert, not a custom orthotic device--despite what the ads might say.  Shoe inserts can be very helpful for a variety of foot ailments, including flat arches and foot and leg pain. They can cushion your feet, provide comfort, and support your arches, but they can't correct biomechanical foot problems or cure long-standing foot issues.  The most common types of shoe inserts are:  Arch supports: Some people have high arches. Others have low arches or flat feet. Arch supports generally have a  bumped-up  appearance and are designed to support the foot's natural arch.   Insoles: Insoles slip into your shoe to provide extra cushioning and support. Insoles are often made of gel, foam, or plastic.   Heel liners: Heel liners, sometimes called heel pads or heel cups, provide extra cushioning in the heel region. They may be especially useful for patients who have foot pain caused by age-related thinning of the heels' natural fat pads.   Foot cushions: Do your shoes rub against your heel or your toes? Foot cushions come in many different shapes and sizes and can be used as a barrier between you and your shoe.  Choosing an Over-the-Counter Shoe Insert  Selecting a shoe insert from the wide variety of devices on the market can be overwhelming. Here are some podiatrist-tested tips to help you find the insert that best meets your needs:  Consider your health. Do you have diabetes? Problems with circulation? An over-the-counter insert may not be your best bet. Diabetes and poor circulation increase your risk of foot ulcers and infections, so schedule an appointment with a podiatrist. He or she can help you select a solution that won't cause additional  health problems.   Think about the purpose. Are you planning to run a marathon, or do you just need a little arch support in your work shoes? Look for a product that fits your planned level of activity.   Bring your shoes. For the insert to be effective, it has to fit into your shoes. So bring your sneakers, dress shoes, or work boots--whatever you plan to wear with your insert. Look for an insert that will fit the contours of your shoe.   Try them on. If all possible, slip the insert into your shoe and try it out. Walk around a little. How does it feel? Don't assume that feelings of pressure will go away with continued wear. (If you can't try the inserts at the store, ask about the store's return policy and hold on to your receipt.)    Please call one of the [place] locations below to schedule an appointment. If you received a prescription please bring it with you to your appointment. Some locations are limited to what they carry.    Office Locations    [facility]  [address] [place] [address]  Home Medical Equipment, [address]   Phone: [phone]   Orthotics and Prosthetics, [address]   Phone: [phone]    [facility]  [address]  [address]   [place] [address]   Phone: [phone]    [facility]  [address]  [place] [address]  Phone: [phone]    [facility]   [address]  [place] [address]  Phone: [phone]    [facility]  [address]  [place] [address]  Phone: [phone]    [facility]  [address]  [place] [address]  Phone: [phone]    [place]   [address] [address]   Phone: [phone]    WEARING YOUR CUSTOM FOOT ORTHOTICS   Most  insurance plans cover one pair of orthotics per year. You must check with your   insurance plan to see what your payment responsibility will be. Please call your   insurance company by calling the number on the back of your insurance card.   Orthotic's are non-refundable and non-returnable.   Orthotics are made of various designs. Some orthotics are covered with material that extends beyond your toes. If your orthotic is of this design, you will likely need to trim the toe end to get a proper fit. The insole from your shoe can be used as a template. Simply overlay the shoe insert on top of the custom orthotic. Align the heel end while tracing the length of the insert onto the custom orthotic. Use a large scissor to trim the toe end until you get a proper fit in the shoe.   The orthotic needs to be pushed as far back in the shoe as possible. The heel portion should not ride forward so as not to irritate your heel.   Orthotics are designed to work with socks. Excessive perspiration will shorten the life span of the orthotics. Remove the orthotic from the shoe frequently for proper drying.   The break-in period lasts for weeks. People new to orthotics will likely experience new aches and pains. The orthotic is forcing your foot into a new position. Arch, foot and leg muscle aches and fatigue are common during these weeks. Minor discomfort can be considered normal break in phenomenon. Start wearing your orthotic around your home your first day. Limited activity for one to two hours is recommended. You can increase one or two additional hours each day provided the aches and pains are subsiding. The degree of discomfort, fatigue and problems will dictate the speed of break in. You may require multiple weeks to work up to full time use.   Do not continue wearing your orthotics if they are creating problems such as blisters or sores. Do not hesitate to call the clinic to speak with a nurse regarding orthotic   break in,  fit, trimming, etc. You may also need to see the doctor if the orthotics are   simply not working out. Adjustments are sometimes made to improve orthotic   function.     Orthotics will only work in certain styles and types of shoes. Orthotics rarely work in dress shoes. Slip-ons, clogs, sandals and heels are particularly troublesome. Specially designed orthotics may be necessary for these types of shoes. Your custom orthotic was designed for activities that require appropriate walking or running shoes. Lace up athletic shoes, walking shoes or work boots should work appropriately. You may need a wider or longer shoe. Shoes with a removable  or insert work best. In general, you want to remove an insert from the shoe before placing the orthotic into the shoe. Shoes without a removable liner may not work as well.     When purchasing new shoes, bring your orthotics along to get a proper fit. Shop at stores that are familiar with orthotics.   Frequent washing of the orthotic may shorten the life span of the top cover. The top cover can be replaced but will generally last one to five years depending on use and foot perspiration.

## 2022-05-26 NOTE — LETTER
5/26/2022         RE: Tashi Cuellar  [address] [place] [address] [address]  [place] [address]        Dear Colleague,    Thank you for referring your patient, Tashi Cuellar, to the [facility]. Please see a copy of my visit note below.    FOOT AND ANKLE SURGERY/PODIATRY CONSULT NOTE         ASSESSMENT:   Pronation deformity  Type 2 diabetes without complication      TREATMENT:  I have recommended diabetic orthotics.  The patient is to return to the clinic as needed.        HPI: I was asked to see Tashi Cuellar today to evaluate bilateral feet.  The patient stated that he is diabetic and he does have some mild tingling involving the left foot primarily near the big toe.  He stated that he has had this sensation for the past several months.  He indicated that several weeks ago he had severe pain in the left ankle which has markedly improved.  He feels somewhat unsteady with ambulation but he attributes this to his decreased peripheral vision.  He has no numbness in his feet.  He has no tingling or pins-and-needles other than what was previously mentioned.  He denies any trauma to his feet.  He has not had any swelling.  He is able to wear shoes with without much discomfort.  The patient was seen in consultation at the request of Clifton Mccray MD for evaluation of bilateral feet.     Past Medical History:   Diagnosis Date     ASCVD (arteriosclerotic cardiovascular disease)      Asthma      Diabetes mellitus, type II (H)      Gallstones      Glaucoma      Gout      HTN (hypertension)      Hydronephrosis of right kidney        Social History     Socioeconomic History     Marital status: Single     Spouse name: Not on file     Number of children: Not on file     Years of education: Not on file     Highest education level: Not on file   Occupational History     Not on file   Tobacco Use     Smoking status: Current Some Day Smoker     Types: Pipe     Smokeless tobacco: Never Used    Substance and Sexual Activity     Alcohol use: Not Currently     Drug use: Not Currently     Sexual activity: Not on file   Other Topics Concern     Parent/sibling w/ CABG, MI or angioplasty before 65F 55M? Not Asked   Social History Narrative     Not on file     Social Determinants of Health     Financial Resource Strain: Not on file   Food Insecurity: Not on file   Transportation Needs: Not on file   Physical Activity: Not on file   Stress: Not on file   Social Connections: Not on file   Intimate Partner Violence: Not on file   Housing Stability: Not on file        No Known Allergies       Current Outpatient Medications:      ACETAMINOPHEN PO, Take 1,300 mg by mouth 2 times daily as needed, Disp: , Rfl:      albuterol (PROAIR HFA) 90 mcg/actuation inhaler, [ALBUTEROL (PROAIR HFA) 90 MCG/ACTUATION INHALER] Inhale 1 puff every 4 (four) hours as needed. Every 4-6 hours as needed., Disp: 1 Inhaler, Rfl: 2     albuterol (PROAIR HFA/PROVENTIL HFA/VENTOLIN HFA) 108 (90 Base) MCG/ACT inhaler, Inhale 2 puffs into the lungs every 6 hours Give patient spacer to use with inhaler(VHC/Spacer), Disp: 18 g, Rfl: 1     atorvastatin (LIPITOR) 20 MG tablet, Take 1 tablet (20 mg) by mouth daily, Disp: 90 tablet, Rfl: 3     bimatoprost (LUMIGAN) 0.03 % ophthalmic drops, Place 1 drop into both eyes every evening, Disp: , Rfl:      blood glucose calibration (ONETOUCH ULTRA CONTROL) solution, , Disp: , Rfl:      blood-glucose meter (ONETOUCH VERIO IQ METER) Misc, [BLOOD-GLUCOSE METER (ONETOUCH VERIO IQ METER) MISC] Use 1 each As Directed as needed., Disp: 1 each, Rfl: 0     brimonidine (ALPHAGAN) 0.15 % ophthalmic solution, [BRIMONIDINE (ALPHAGAN) 0.15 % OPHTHALMIC SOLUTION] Administer 1 drop to both eyes 3 (three) times a day. , Disp: , Rfl:      calcium carbonate 300 mg (750 mg) Chew, [CALCIUM CARBONATE 300 MG (750 MG) CHEW] Chew 3 tablets 2 (two) times a day as needed., Disp: , Rfl:      dorzolamide (TRUSOPT) 2 % ophthalmic  solution, [DORZOLAMIDE (TRUSOPT) 2 % OPHTHALMIC SOLUTION] INSTILL 1 DROP INTO BOTH EYES 3 TIMES A DAY, Disp: , Rfl:      FIBER PO, , Disp: , Rfl:      ketoconazole (NIZORAL) 2 % external cream, , Disp: , Rfl:      latanoprost (XALATAN) 0.005 % ophthalmic solution, INSTILL 1 DROP INTO BOTH EYES EVERY EVENING., Disp: , Rfl:      losartan (COZAAR) 50 MG tablet, TAKE 1 TABLET BY MOUTH EVERY DAY, Disp: 90 tablet, Rfl: 3     metFORMIN (GLUCOPHAGE-XR) 500 MG 24 hr tablet, Take 2 tablets (1,000 mg) by mouth daily (with dinner), Disp: 180 tablet, Rfl: 3     ONETOUCH VERIO IQ test strip, TEST DAILY, Disp: 100 strip, Rfl: 2     prednisoLONE acetate (PRED FORTE) 1 % ophthalmic suspension, , Disp: , Rfl:      tamsulosin (FLOMAX) 0.4 mg Cp24, [TAMSULOSIN (FLOMAX) 0.4 MG CP24] Take 0.4 mg by mouth daily., Disp: , Rfl: 3     timolol maleate (TIMOPTIC) 0.5 % ophthalmic solution, , Disp: , Rfl:      No family history on file.     Social History     Socioeconomic History     Marital status: Single     Spouse name: Not on file     Number of children: Not on file     Years of education: Not on file     Highest education level: Not on file   Occupational History     Not on file   Tobacco Use     Smoking status: Current Some Day Smoker     Types: Pipe     Smokeless tobacco: Never Used   Substance and Sexual Activity     Alcohol use: Not Currently     Drug use: Not Currently     Sexual activity: Not on file   Other Topics Concern     Parent/sibling w/ CABG, MI or angioplasty before 65F 55M? Not Asked   Social History Narrative     Not on file     Social Determinants of Health     Financial Resource Strain: Not on file   Food Insecurity: Not on file   Transportation Needs: Not on file   Physical Activity: Not on file   Stress: Not on file   Social Connections: Not on file   Intimate Partner Violence: Not on file   Housing Stability: Not on file        Review of Systems - Patient denies fever, chills, rash, wound, stiffness, limping,  numbness, weakness, heart burn, blood in stool, chest pain with activity, calf pain when walking, shortness of breath with activity, chronic cough, easy bleeding/bruising, swelling of ankles, excessive thirst, fatigue, depression, anxiety.  Patient admits to mild tingling of the left foot.      OBJECTIVE:  Appearance: alert, well appearing, and in no distress.    There were no vitals taken for this visit.     There is no height or weight on file to calculate BMI.     General appearance: Patient is alert and fully cooperative with history & exam.  No sign of distress is noted during the visit.  Psychiatric: Affect is pleasant & appropriate.  Patient appears motivated to improve health.  Respiratory: Breathing is regular & unlabored while sitting.  HEENT: Hearing is intact to spoken word.  Speech is clear.  No gross evidence of visual impairment that would impact ambulation.    Vascular: Dorsalis pedis and posterior tibial pulses are palpable. There is no pedal hair growth bilaterally.  CFT < 3 sec from anterior tibial surface to distal digits bilaterally. There is no appreciable edema noted.  Dermatologic: Turgor and texture are within normal limits. No coloration or temperature changes. No primary or secondary lesions noted.  Neurologic: All epicritic and proprioceptive sensations are grossly intact bilaterally.  Negative Tinel sign when percussing the tarsal tunnel and the deep peroneal nerve bilaterally  Musculoskeletal: All active and passive ankle, subtalar, midtarsal, and 1st MPJ range of motion are grossly intact without pain or crepitus, with the exception of none. Manual muscle strength is within normal limits bilaterally. All dorsiflexors, plantarflexors, invertors, evertors are intact bilaterally.  No tenderness present to bilateral feet or ankles on palpation.  No tenderness to bilateral feet or ankles with range of motion.  Flattening of the medial longitudinal arch noted bilaterally.  Calf is  soft/non-tender without warmth/induration    Imaging:       No images are attached to the encounter or orders placed in the encounter.     No results found.   No results found.       Mauro Haiedr DPM  [facility]le Surgery/Podiatry         Again, thank you for allowing me to participate in the care of your patient.        Sincerely,        Mauro Muniz DPM

## 2022-05-26 NOTE — PROGRESS NOTES
FOOT AND ANKLE SURGERY/PODIATRY CONSULT NOTE         ASSESSMENT:   Pronation deformity  Type 2 diabetes without complication      TREATMENT:  I have recommended diabetic orthotics.  The patient is to return to the clinic as needed.        HPI: I was asked to see Tashi Cuellar today to evaluate bilateral feet.  The patient stated that he is diabetic and he does have some mild tingling involving the left foot primarily near the big toe.  He stated that he has had this sensation for the past several months.  He indicated that several weeks ago he had severe pain in the left ankle which has markedly improved.  He feels somewhat unsteady with ambulation but he attributes this to his decreased peripheral vision.  He has no numbness in his feet.  He has no tingling or pins-and-needles other than what was previously mentioned.  He denies any trauma to his feet.  He has not had any swelling.  He is able to wear shoes with without much discomfort.  The patient was seen in consultation at the request of Clifton Mccray MD for evaluation of bilateral feet.     Past Medical History:   Diagnosis Date     ASCVD (arteriosclerotic cardiovascular disease)      Asthma      Diabetes mellitus, type II (H)      Gallstones      Glaucoma      Gout      HTN (hypertension)      Hydronephrosis of right kidney        Social History     Socioeconomic History     Marital status: Single     Spouse name: Not on file     Number of children: Not on file     Years of education: Not on file     Highest education level: Not on file   Occupational History     Not on file   Tobacco Use     Smoking status: Current Some Day Smoker     Types: Pipe     Smokeless tobacco: Never Used   Substance and Sexual Activity     Alcohol use: Not Currently     Drug use: Not Currently     Sexual activity: Not on file   Other Topics Concern     Parent/sibling w/ CABG, MI or angioplasty before 65F 55M? Not Asked   Social History Narrative     Not on file     Social  Determinants of Health     Financial Resource Strain: Not on file   Food Insecurity: Not on file   Transportation Needs: Not on file   Physical Activity: Not on file   Stress: Not on file   Social Connections: Not on file   Intimate Partner Violence: Not on file   Housing Stability: Not on file        No Known Allergies       Current Outpatient Medications:      ACETAMINOPHEN PO, Take 1,300 mg by mouth 2 times daily as needed, Disp: , Rfl:      albuterol (PROAIR HFA) 90 mcg/actuation inhaler, [ALBUTEROL (PROAIR HFA) 90 MCG/ACTUATION INHALER] Inhale 1 puff every 4 (four) hours as needed. Every 4-6 hours as needed., Disp: 1 Inhaler, Rfl: 2     albuterol (PROAIR HFA/PROVENTIL HFA/VENTOLIN HFA) 108 (90 Base) MCG/ACT inhaler, Inhale 2 puffs into the lungs every 6 hours Give patient spacer to use with inhaler(VHC/Spacer), Disp: 18 g, Rfl: 1     atorvastatin (LIPITOR) 20 MG tablet, Take 1 tablet (20 mg) by mouth daily, Disp: 90 tablet, Rfl: 3     bimatoprost (LUMIGAN) 0.03 % ophthalmic drops, Place 1 drop into both eyes every evening, Disp: , Rfl:      blood glucose calibration (ONETOUCH ULTRA CONTROL) solution, , Disp: , Rfl:      blood-glucose meter (ONETOUCH VERIO IQ METER) Misc, [BLOOD-GLUCOSE METER (ONETOUCH VERIO IQ METER) MISC] Use 1 each As Directed as needed., Disp: 1 each, Rfl: 0     brimonidine (ALPHAGAN) 0.15 % ophthalmic solution, [BRIMONIDINE (ALPHAGAN) 0.15 % OPHTHALMIC SOLUTION] Administer 1 drop to both eyes 3 (three) times a day. , Disp: , Rfl:      calcium carbonate 300 mg (750 mg) Chew, [CALCIUM CARBONATE 300 MG (750 MG) CHEW] Chew 3 tablets 2 (two) times a day as needed., Disp: , Rfl:      dorzolamide (TRUSOPT) 2 % ophthalmic solution, [DORZOLAMIDE (TRUSOPT) 2 % OPHTHALMIC SOLUTION] INSTILL 1 DROP INTO BOTH EYES 3 TIMES A DAY, Disp: , Rfl:      FIBER PO, , Disp: , Rfl:      ketoconazole (NIZORAL) 2 % external cream, , Disp: , Rfl:      latanoprost (XALATAN) 0.005 % ophthalmic solution, INSTILL 1 DROP  INTO BOTH EYES EVERY EVENING., Disp: , Rfl:      losartan (COZAAR) 50 MG tablet, TAKE 1 TABLET BY MOUTH EVERY DAY, Disp: 90 tablet, Rfl: 3     metFORMIN (GLUCOPHAGE-XR) 500 MG 24 hr tablet, Take 2 tablets (1,000 mg) by mouth daily (with dinner), Disp: 180 tablet, Rfl: 3     ONETOUCH VERIO IQ test strip, TEST DAILY, Disp: 100 strip, Rfl: 2     prednisoLONE acetate (PRED FORTE) 1 % ophthalmic suspension, , Disp: , Rfl:      tamsulosin (FLOMAX) 0.4 mg Cp24, [TAMSULOSIN (FLOMAX) 0.4 MG CP24] Take 0.4 mg by mouth daily., Disp: , Rfl: 3     timolol maleate (TIMOPTIC) 0.5 % ophthalmic solution, , Disp: , Rfl:      No family history on file.     Social History     Socioeconomic History     Marital status: Single     Spouse name: Not on file     Number of children: Not on file     Years of education: Not on file     Highest education level: Not on file   Occupational History     Not on file   Tobacco Use     Smoking status: Current Some Day Smoker     Types: Pipe     Smokeless tobacco: Never Used   Substance and Sexual Activity     Alcohol use: Not Currently     Drug use: Not Currently     Sexual activity: Not on file   Other Topics Concern     Parent/sibling w/ CABG, MI or angioplasty before 65F 55M? Not Asked   Social History Narrative     Not on file     Social Determinants of Health     Financial Resource Strain: Not on file   Food Insecurity: Not on file   Transportation Needs: Not on file   Physical Activity: Not on file   Stress: Not on file   Social Connections: Not on file   Intimate Partner Violence: Not on file   Housing Stability: Not on file        Review of Systems - Patient denies fever, chills, rash, wound, stiffness, limping, numbness, weakness, heart burn, blood in stool, chest pain with activity, calf pain when walking, shortness of breath with activity, chronic cough, easy bleeding/bruising, swelling of ankles, excessive thirst, fatigue, depression, anxiety.  Patient admits to mild tingling of the left  foot.      OBJECTIVE:  Appearance: alert, well appearing, and in no distress.    There were no vitals taken for this visit.     There is no height or weight on file to calculate BMI.     General appearance: Patient is alert and fully cooperative with history & exam.  No sign of distress is noted during the visit.  Psychiatric: Affect is pleasant & appropriate.  Patient appears motivated to improve health.  Respiratory: Breathing is regular & unlabored while sitting.  HEENT: Hearing is intact to spoken word.  Speech is clear.  No gross evidence of visual impairment that would impact ambulation.    Vascular: Dorsalis pedis and posterior tibial pulses are palpable. There is no pedal hair growth bilaterally.  CFT < 3 sec from anterior tibial surface to distal digits bilaterally. There is no appreciable edema noted.  Dermatologic: Turgor and texture are within normal limits. No coloration or temperature changes. No primary or secondary lesions noted.  Neurologic: All epicritic and proprioceptive sensations are grossly intact bilaterally.  Negative Tinel sign when percussing the tarsal tunnel and the deep peroneal nerve bilaterally  Musculoskeletal: All active and passive ankle, subtalar, midtarsal, and 1st MPJ range of motion are grossly intact without pain or crepitus, with the exception of none. Manual muscle strength is within normal limits bilaterally. All dorsiflexors, plantarflexors, invertors, evertors are intact bilaterally.  No tenderness present to bilateral feet or ankles on palpation.  No tenderness to bilateral feet or ankles with range of motion.  Flattening of the medial longitudinal arch noted bilaterally.  Calf is soft/non-tender without warmth/induration    Imaging:       No images are attached to the encounter or orders placed in the encounter.     No results found.   No results found.       Mauro Haider DPM  [facility] Foot & Ankle Surgery/Podiatry

## 2022-06-20 ENCOUNTER — TRANSFERRED RECORDS (OUTPATIENT)
Dept: HEALTH INFORMATION MANAGEMENT | Facility: CLINIC | Age: 77
End: 2022-06-20

## 2022-06-20 LAB — RETINOPATHY: NEGATIVE

## 2022-09-18 DIAGNOSIS — E78.5 HYPERLIPIDEMIA LDL GOAL <100: ICD-10-CM

## 2022-09-18 DIAGNOSIS — E11.65 TYPE 2 DIABETES MELLITUS WITH HYPERGLYCEMIA, WITHOUT LONG-TERM CURRENT USE OF INSULIN (H): ICD-10-CM

## 2022-09-19 RX ORDER — ATORVASTATIN CALCIUM 20 MG/1
20 TABLET, FILM COATED ORAL DAILY
Qty: 90 TABLET | Refills: 0 | Status: SHIPPED | OUTPATIENT
Start: 2022-09-19 | End: 2022-12-21

## 2022-09-19 NOTE — TELEPHONE ENCOUNTER
"Routing refill request to provider for review/approval because:  Labs not current:  LDL    Last Written Prescription Date:  9/17/21  Last Fill Quantity: 90,  # refills: 3   Last office visit provider:  5/18/22     Requested Prescriptions   Pending Prescriptions Disp Refills     atorvastatin (LIPITOR) 20 MG tablet [Pharmacy Med Name: ATORVASTATIN 20 MG TABLET] 90 tablet 3     Sig: TAKE 1 TABLET BY MOUTH EVERY DAY       Statins Protocol Failed - 9/19/2022 12:07 PM        Failed - LDL on file in past 12 months     Recent Labs   Lab Test 05/11/21  0856   LDL 64             Passed - No abnormal creatine kinase in past 12 months     No lab results found.             Passed - Recent (12 mo) or future (30 days) visit within the authorizing provider's specialty     Patient has had an office visit with the authorizing provider or a provider within the authorizing providers department within the previous 12 mos or has a future within next 30 days. See \"Patient Info\" tab in inbasket, or \"Choose Columns\" in Meds & Orders section of the refill encounter.              Passed - Medication is active on med list        Passed - Patient is age 18 or older             Anson Lopez RN 09/19/22 12:07 PM  "

## 2022-10-03 ENCOUNTER — NURSE TRIAGE (OUTPATIENT)
Dept: NURSING | Facility: CLINIC | Age: 77
End: 2022-10-03

## 2022-10-03 NOTE — TELEPHONE ENCOUNTER
Patient calling to find out if he can get a booster. He reports his last Booster was in May 2022.    Patient was advised ok to get most recent Bivalent vaccine Booster, and he was advised on where to get the vaccine.    Yajaira Cage RN   [facility] Nurse Advisor            Reason for Disposition    Information only question and nurse able to answer    Protocols used: INFORMATION ONLY CALL - NO TRIAGE-A-[place]

## 2022-11-11 DIAGNOSIS — E11.65 TYPE 2 DIABETES MELLITUS WITH HYPERGLYCEMIA, WITHOUT LONG-TERM CURRENT USE OF INSULIN (H): ICD-10-CM

## 2022-11-13 NOTE — TELEPHONE ENCOUNTER
"Due to be seen with labs  Has appt 11/23/22    Last Written Prescription Date:  9/17/21  Last Fill Quantity: 180,  # refills: 3   Last office visit provider:  5/18/22     Requested Prescriptions   Pending Prescriptions Disp Refills     metFORMIN (GLUCOPHAGE XR) 500 MG 24 hr tablet [Pharmacy Med Name: METFORMIN HCL  MG TABLET] 180 tablet 3     Sig: TAKE 2 TABLETS (1,000 MG) BY MOUTH DAILY (WITH DINNER)       Biguanide Agents Passed - 11/11/2022 10:52 AM        Passed - Patient is age 10 or older        Passed - Patient has documented A1c within the specified period of time.     If HgbA1C is 8 or greater, it needs to be on file within the past 3 months.  If less than 8, must be on file within the past 6 months.     Recent Labs   Lab Test 05/18/22  0842   A1C 7.7*             Passed - Patient's CR is NOT>1.4 OR Patient's EGFR is NOT<45 within past 12 mos.     Recent Labs   Lab Test 01/20/22  0914 09/17/21  1100 05/11/21  0856   GFRESTIMATED >90   < > >60   GFRESTBLACK  --   --  >60    < > = values in this interval not displayed.       Recent Labs   Lab Test 01/20/22  0914   CR 0.78             Passed - Patient does NOT have a diagnosis of CHF.        Passed - Medication is active on med list        Passed - Recent (6 mo) or future (30 days) visit within the authorizing provider's specialty     Patient had office visit in the last 6 months or has a visit in the next 30 days with authorizing provider or within the authorizing provider's specialty.  See \"Patient Info\" tab in inbasket, or \"Choose Columns\" in Meds & Orders section of the refill encounter.                 Ramona Jon, RN 11/12/22 8:44 PM  "

## 2022-11-14 RX ORDER — METFORMIN HCL 500 MG
1000 TABLET, EXTENDED RELEASE 24 HR ORAL
Qty: 180 TABLET | Refills: 3 | Status: SHIPPED | OUTPATIENT
Start: 2022-11-14 | End: 2023-01-01

## 2022-11-16 DIAGNOSIS — E11.9 DM2 (DIABETES MELLITUS, TYPE 2) (H): ICD-10-CM

## 2022-11-16 RX ORDER — BLOOD SUGAR DIAGNOSTIC
STRIP MISCELLANEOUS
Qty: 100 STRIP | Refills: 0 | Status: SHIPPED | OUTPATIENT
Start: 2022-11-16 | End: 2023-01-01

## 2022-11-17 NOTE — TELEPHONE ENCOUNTER
"Last Written Prescription Date:  1/26/22  Last Fill Quantity: 100,  # refills: 2   Last office visit provider:  5/18/22     Requested Prescriptions   Pending Prescriptions Disp Refills     ONETOUCH VERIO IQ test strip [Pharmacy Med Name: ONE TOUCH VERIO TEST STRIP] 100 strip 2     Sig: USE TO TEST DAILY       Diabetic Supplies Protocol Passed - 11/16/2022 12:01 AM        Passed - Medication is active on med list        Passed - Patient is 18 years of age or older        Passed - Recent (6 mo) or future (30 days) visit within the authorizing provider's specialty     Patient had office visit in the last 6 months or has a visit in the next 30 days with authorizing provider.  See \"Patient Info\" tab in inbasket, or \"Choose Columns\" in Meds & Orders section of the refill encounter.                 Ramona Jon RN 11/16/22 6:14 PM  "

## 2022-11-21 DIAGNOSIS — E11.9 TYPE 2 DIABETES MELLITUS WITHOUT COMPLICATIONS (H): ICD-10-CM

## 2022-11-22 NOTE — TELEPHONE ENCOUNTER
"Routing refill request to provider for review/approval because:  Drug not active on patient's medication list    Last Written Prescription Date:  ???  Last Fill Quantity: ???,  # refills: ???   Last office visit provider:  5/18/22     Requested Prescriptions   Pending Prescriptions Disp Refills     blood glucose calibration (ONETOUCH ULTRA CONTROL) solution [Pharmacy Med Name: ONE TOUCH ULTRA CONTROL SOLN]  2     Sig: USE 1 EACH AS DIRECTED AS NEEDED.       Diabetic Supplies Protocol Passed - 11/22/2022 10:32 AM        Passed - Medication is active on med list        Passed - Patient is 18 years of age or older        Passed - Recent (6 mo) or future (30 days) visit within the authorizing provider's specialty     Patient had office visit in the last 6 months or has a visit in the next 30 days with authorizing provider.  See \"Patient Info\" tab in inbasket, or \"Choose Columns\" in Meds & Orders section of the refill encounter.                 Anson Lopez RN 11/22/22 10:33 AM  "

## 2022-11-23 ENCOUNTER — OFFICE VISIT (OUTPATIENT)
Dept: FAMILY MEDICINE | Facility: CLINIC | Age: 77
End: 2022-11-23
Payer: COMMERCIAL

## 2022-11-23 VITALS
SYSTOLIC BLOOD PRESSURE: 128 MMHG | WEIGHT: 183 LBS | HEART RATE: 78 BPM | HEIGHT: 66 IN | RESPIRATION RATE: 18 BRPM | OXYGEN SATURATION: 98 % | BODY MASS INDEX: 29.41 KG/M2 | DIASTOLIC BLOOD PRESSURE: 76 MMHG

## 2022-11-23 DIAGNOSIS — Z00.00 ENCOUNTER FOR MEDICARE ANNUAL WELLNESS EXAM: ICD-10-CM

## 2022-11-23 DIAGNOSIS — E78.00 HYPERCHOLESTEREMIA: ICD-10-CM

## 2022-11-23 DIAGNOSIS — E11.65 TYPE 2 DIABETES MELLITUS WITH HYPERGLYCEMIA, WITHOUT LONG-TERM CURRENT USE OF INSULIN (H): ICD-10-CM

## 2022-11-23 DIAGNOSIS — Z12.5 ENCOUNTER FOR SCREENING FOR MALIGNANT NEOPLASM OF PROSTATE: ICD-10-CM

## 2022-11-23 DIAGNOSIS — E78.5 HYPERLIPIDEMIA LDL GOAL <100: Primary | ICD-10-CM

## 2022-11-23 LAB
ALBUMIN SERPL BCG-MCNC: 4.1 G/DL (ref 3.5–5.2)
ALP SERPL-CCNC: 76 U/L (ref 40–129)
ALT SERPL W P-5'-P-CCNC: 18 U/L (ref 10–50)
ANION GAP SERPL CALCULATED.3IONS-SCNC: 8 MMOL/L (ref 7–15)
AST SERPL W P-5'-P-CCNC: 23 U/L (ref 10–50)
BILIRUB SERPL-MCNC: 0.4 MG/DL
BUN SERPL-MCNC: 15.6 MG/DL (ref 8–23)
CALCIUM SERPL-MCNC: 8.9 MG/DL (ref 8.8–10.2)
CHLORIDE SERPL-SCNC: 103 MMOL/L (ref 98–107)
CHOLEST SERPL-MCNC: 111 MG/DL
CREAT SERPL-MCNC: 0.71 MG/DL (ref 0.67–1.17)
CREAT UR-MCNC: 149 MG/DL
DEPRECATED HCO3 PLAS-SCNC: 24 MMOL/L (ref 22–29)
GFR SERPL CREATININE-BSD FRML MDRD: >90 ML/MIN/1.73M2
GLUCOSE SERPL-MCNC: 187 MG/DL (ref 70–99)
HBA1C MFR BLD: 8 % (ref 0–5.6)
HDLC SERPL-MCNC: 46 MG/DL
LDLC SERPL CALC-MCNC: 51 MG/DL
MICROALBUMIN UR-MCNC: 73 MG/L
MICROALBUMIN/CREAT UR: 48.99 MG/G CR (ref 0–17)
NONHDLC SERPL-MCNC: 65 MG/DL
POTASSIUM SERPL-SCNC: 4.4 MMOL/L (ref 3.4–5.3)
PROT SERPL-MCNC: 6.7 G/DL (ref 6.4–8.3)
PSA SERPL-MCNC: 0.89 NG/ML (ref 0–6.5)
SODIUM SERPL-SCNC: 135 MMOL/L (ref 136–145)
TRIGL SERPL-MCNC: 69 MG/DL

## 2022-11-23 PROCEDURE — G0103 PSA SCREENING: HCPCS | Performed by: FAMILY MEDICINE

## 2022-11-23 PROCEDURE — 80053 COMPREHEN METABOLIC PANEL: CPT | Performed by: FAMILY MEDICINE

## 2022-11-23 PROCEDURE — 36415 COLL VENOUS BLD VENIPUNCTURE: CPT | Performed by: FAMILY MEDICINE

## 2022-11-23 PROCEDURE — 80061 LIPID PANEL: CPT | Performed by: FAMILY MEDICINE

## 2022-11-23 PROCEDURE — 99214 OFFICE O/P EST MOD 30 MIN: CPT | Performed by: FAMILY MEDICINE

## 2022-11-23 PROCEDURE — 83036 HEMOGLOBIN GLYCOSYLATED A1C: CPT | Performed by: FAMILY MEDICINE

## 2022-11-23 PROCEDURE — 82043 UR ALBUMIN QUANTITATIVE: CPT | Performed by: FAMILY MEDICINE

## 2022-11-23 ASSESSMENT — ASTHMA QUESTIONNAIRES
QUESTION_4 LAST FOUR WEEKS HOW OFTEN HAVE YOU USED YOUR RESCUE INHALER OR NEBULIZER MEDICATION (SUCH AS ALBUTEROL): NOT AT ALL
ACT_TOTALSCORE: 25
QUESTION_1 LAST FOUR WEEKS HOW MUCH OF THE TIME DID YOUR ASTHMA KEEP YOU FROM GETTING AS MUCH DONE AT WORK, SCHOOL OR AT HOME: NONE OF THE TIME
ACT_TOTALSCORE: 25
QUESTION_5 LAST FOUR WEEKS HOW WOULD YOU RATE YOUR ASTHMA CONTROL: COMPLETELY CONTROLLED
QUESTION_2 LAST FOUR WEEKS HOW OFTEN HAVE YOU HAD SHORTNESS OF BREATH: NOT AT ALL
QUESTION_3 LAST FOUR WEEKS HOW OFTEN DID YOUR ASTHMA SYMPTOMS (WHEEZING, COUGHING, SHORTNESS OF BREATH, CHEST TIGHTNESS OR PAIN) WAKE YOU UP AT NIGHT OR EARLIER THAN USUAL IN THE MORNING: NOT AT ALL

## 2022-11-23 ASSESSMENT — PAIN SCALES - GENERAL: PAINLEVEL: NO PAIN (0)

## 2022-11-23 NOTE — PROGRESS NOTES
"  Assessment & Plan     Type 2 diabetes mellitus without complications (H)  Continue metformin    Hyperlipidemia LDL goal <100  Continue atorvastatin  - Lipid panel reflex to direct LDL Fasting    Encounter for Medicare annual wellness exam  Work-up as follows  - Lipid panel reflex to direct LDL Fasting    DM2 (diabetes mellitus, type 2) (H)  Work-up as follows  - Comprehensive metabolic panel (BMP + Alb, Alk Phos, ALT, AST, Total. Bili, TP)  - Hemoglobin A1c  - Albumin Random Urine Quantitative with Creat Ratio    Hypercholesteremia    - Lipid panel reflex to direct LDL Fasting    Encounter for screening for malignant neoplasm of prostate    - PSA, screen               Nicotine/Tobacco Cessation:  He reports that he has been smoking pipe. He has never used smokeless tobacco.  Nicotine/Tobacco Cessation Plan:         BMI:   Estimated body mass index is 29.54 kg/m  as calculated from the following:    Height as of this encounter: 1.676 m (5' 6\").    Weight as of this encounter: 83 kg (183 lb).           No follow-ups on file.    Clifton Mccray MD  [facility]   Tashi is a 77 year old, presenting for the following health issues:  Diabetes and Recheck Medication  Assessment: To follow  Chief complaint I am here for my checkup and my diabetes management  HPI and assessment; Tashi presents here for follow-up of his diabetes mellitus he is on metformin also has high lipids takes atorvastatin.  Blood pressure welcome controlled with losartan.  He has prostatism takes tamsulosin for that.  Eyedrops for glaucoma.  He is doing well weight is stable he is active bowels are moving normally he has no complaints about his prostate says the flow is much better since he is taking the Flomax all medical questions asked were answered I have personally reviewed family social history surgeries allergies problems list.  We will do a PSA today another routine blood work including comprehensive " "profile A1c and urinary albumin.  Nice to see him again          Review of Systems   Constitutional, HEENT, cardiovascular, pulmonary, GI, , musculoskeletal, neuro, skin, endocrine and psych systems are negative, except as otherwise noted.      Objective    /76   Pulse 78   Resp 18   Ht 1.676 m (5' 6\")   Wt 83 kg (183 lb)   SpO2 98%   BMI 29.54 kg/m    Body mass index is 29.54 kg/m .  Physical Exam   GENERAL: healthy, alert and no distress  EYES: Eyes grossly normal to inspection, PERRL and conjunctivae and sclerae normal  HENT: ear canals and TM's normal, nose and mouth without ulcers or lesions  NECK: no adenopathy, no asymmetry, masses, or scars and thyroid normal to palpation  RESP: lungs clear to auscultation - no rales, rhonchi or wheezes  CV: regular rate and rhythm, normal S1 S2, no S3 or S4, no murmur, click or rub, no peripheral edema and peripheral pulses strong  ABDOMEN: soft, nontender, no hepatosplenomegaly, no masses and bowel sounds normal  MS: no gross musculoskeletal defects noted, no edema  SKIN: no suspicious lesions or rashes  NEURO: Normal strength and tone, mentation intact and speech normal  PSYCH: mentation appears normal, affect normal/bright    Rectal exam was deferred today                "

## 2022-11-24 NOTE — LETTER
November 28, 2022      Tashi Cuellar  [address] [place] [address] [address]   [facility] [id number]              Dear Tashi,    Medina Schulz, CORTNEY        Sincerely,      Clifton Mccray {PROVIDER CREDENTIALS:[id number]}

## 2022-12-21 DIAGNOSIS — E11.65 TYPE 2 DIABETES MELLITUS WITH HYPERGLYCEMIA, WITHOUT LONG-TERM CURRENT USE OF INSULIN (H): ICD-10-CM

## 2022-12-21 DIAGNOSIS — E78.5 HYPERLIPIDEMIA LDL GOAL <100: ICD-10-CM

## 2022-12-21 RX ORDER — ATORVASTATIN CALCIUM 20 MG/1
TABLET, FILM COATED ORAL
Qty: 90 TABLET | Refills: 0 | Status: SHIPPED | OUTPATIENT
Start: 2022-12-21 | End: 2023-01-01

## 2022-12-22 NOTE — TELEPHONE ENCOUNTER
"Last Written Prescription Date:  9/19/2022  Last Fill Quantity: 90,  # refills: 0   Last office visit provider:  11/23/2022     Requested Prescriptions   Pending Prescriptions Disp Refills     atorvastatin (LIPITOR) 20 MG tablet [Pharmacy Med Name: ATORVASTATIN 20 MG TABLET] 90 tablet 0     Sig: TAKE 1 TABLET BY MOUTH EVERY DAY       Statins Protocol Passed - 12/21/2022 12:02 AM        Passed - LDL on file in past 12 months     Recent Labs   Lab Test 11/23/22  1024   LDL 51             Passed - No abnormal creatine kinase in past 12 months     No lab results found.             Passed - Recent (12 mo) or future (30 days) visit within the authorizing provider's specialty     Patient has had an office visit with the authorizing provider or a provider within the authorizing providers department within the previous 12 mos or has a future within next 30 days. See \"Patient Info\" tab in inbasket, or \"Choose Columns\" in Meds & Orders section of the refill encounter.              Passed - Medication is active on med list        Passed - Patient is age 18 or older             Annita Powell RN 12/21/22 6:32 PM  "

## 2023-01-01 ENCOUNTER — NURSE TRIAGE (OUTPATIENT)
Dept: NURSING | Facility: CLINIC | Age: 78
End: 2023-01-01
Payer: COMMERCIAL

## 2023-01-01 ENCOUNTER — OFFICE VISIT (OUTPATIENT)
Dept: PODIATRY | Facility: CLINIC | Age: 78
End: 2023-01-01
Payer: COMMERCIAL

## 2023-01-01 ENCOUNTER — TRANSFERRED RECORDS (OUTPATIENT)
Dept: HEALTH INFORMATION MANAGEMENT | Facility: CLINIC | Age: 78
End: 2023-01-01

## 2023-01-01 ENCOUNTER — TRANSFERRED RECORDS (OUTPATIENT)
Dept: HEALTH INFORMATION MANAGEMENT | Facility: CLINIC | Age: 78
End: 2023-01-01
Payer: COMMERCIAL

## 2023-01-01 ENCOUNTER — OFFICE VISIT (OUTPATIENT)
Dept: FAMILY MEDICINE | Facility: CLINIC | Age: 78
End: 2023-01-01
Payer: COMMERCIAL

## 2023-01-01 ENCOUNTER — PATIENT OUTREACH (OUTPATIENT)
Dept: CARE COORDINATION | Facility: CLINIC | Age: 78
End: 2023-01-01
Payer: COMMERCIAL

## 2023-01-01 ENCOUNTER — HOSPITAL ENCOUNTER (OUTPATIENT)
Dept: MRI IMAGING | Facility: CLINIC | Age: 78
Discharge: HOME | End: 2023-02-24
Attending: FAMILY MEDICINE | Admitting: FAMILY MEDICINE
Payer: COMMERCIAL

## 2023-01-01 ENCOUNTER — TELEPHONE (OUTPATIENT)
Dept: FAMILY MEDICINE | Facility: CLINIC | Age: 78
End: 2023-01-01
Payer: COMMERCIAL

## 2023-01-01 VITALS
OXYGEN SATURATION: 96 % | TEMPERATURE: 98.1 F | RESPIRATION RATE: 18 BRPM | HEIGHT: 66 IN | HEART RATE: 72 BPM | DIASTOLIC BLOOD PRESSURE: 80 MMHG | SYSTOLIC BLOOD PRESSURE: 130 MMHG | BODY MASS INDEX: 28.59 KG/M2 | WEIGHT: 177.9 LBS

## 2023-01-01 VITALS
TEMPERATURE: 98.6 F | HEIGHT: 66 IN | HEART RATE: 76 BPM | OXYGEN SATURATION: 98 % | RESPIRATION RATE: 18 BRPM | DIASTOLIC BLOOD PRESSURE: 82 MMHG | SYSTOLIC BLOOD PRESSURE: 132 MMHG | WEIGHT: 175 LBS | BODY MASS INDEX: 28.12 KG/M2

## 2023-01-01 VITALS
HEART RATE: 70 BPM | RESPIRATION RATE: 18 BRPM | BODY MASS INDEX: 28.51 KG/M2 | WEIGHT: 177.4 LBS | OXYGEN SATURATION: 98 % | DIASTOLIC BLOOD PRESSURE: 80 MMHG | SYSTOLIC BLOOD PRESSURE: 120 MMHG | HEIGHT: 66 IN | TEMPERATURE: 97.8 F

## 2023-01-01 VITALS
TEMPERATURE: 98.3 F | HEART RATE: 73 BPM | RESPIRATION RATE: 15 BRPM | DIASTOLIC BLOOD PRESSURE: 95 MMHG | OXYGEN SATURATION: 98 % | WEIGHT: 185 LBS | BODY MASS INDEX: 29.86 KG/M2 | SYSTOLIC BLOOD PRESSURE: 182 MMHG

## 2023-01-01 VITALS
DIASTOLIC BLOOD PRESSURE: 88 MMHG | BODY MASS INDEX: 28.73 KG/M2 | TEMPERATURE: 98.1 F | HEART RATE: 75 BPM | SYSTOLIC BLOOD PRESSURE: 136 MMHG | OXYGEN SATURATION: 99 % | RESPIRATION RATE: 15 BRPM | WEIGHT: 178 LBS

## 2023-01-01 VITALS
DIASTOLIC BLOOD PRESSURE: 88 MMHG | OXYGEN SATURATION: 97 % | RESPIRATION RATE: 12 BRPM | TEMPERATURE: 98.1 F | HEART RATE: 72 BPM | SYSTOLIC BLOOD PRESSURE: 160 MMHG

## 2023-01-01 DIAGNOSIS — M54.50 BILATERAL LOW BACK PAIN WITHOUT SCIATICA, UNSPECIFIED CHRONICITY: Primary | ICD-10-CM

## 2023-01-01 DIAGNOSIS — E11.65 TYPE 2 DIABETES MELLITUS WITH HYPERGLYCEMIA, WITHOUT LONG-TERM CURRENT USE OF INSULIN (H): ICD-10-CM

## 2023-01-01 DIAGNOSIS — E11.9 TYPE 2 DIABETES MELLITUS WITHOUT COMPLICATION, WITH NO HISTORY OF INSULIN USE (H): ICD-10-CM

## 2023-01-01 DIAGNOSIS — E11.9 DM2 (DIABETES MELLITUS, TYPE 2) (H): ICD-10-CM

## 2023-01-01 DIAGNOSIS — T78.40XA ALLERGIC REACTION, INITIAL ENCOUNTER: Primary | ICD-10-CM

## 2023-01-01 DIAGNOSIS — R51.9 ACUTE NONINTRACTABLE HEADACHE, UNSPECIFIED HEADACHE TYPE: ICD-10-CM

## 2023-01-01 DIAGNOSIS — R42 DIZZINESS: ICD-10-CM

## 2023-01-01 DIAGNOSIS — R42 DIZZINESS: Primary | ICD-10-CM

## 2023-01-01 DIAGNOSIS — Z23 ENCOUNTER FOR IMMUNIZATION: ICD-10-CM

## 2023-01-01 DIAGNOSIS — E78.5 HYPERLIPIDEMIA LDL GOAL <100: ICD-10-CM

## 2023-01-01 DIAGNOSIS — E11.65 TYPE 2 DIABETES MELLITUS WITH HYPERGLYCEMIA, WITHOUT LONG-TERM CURRENT USE OF INSULIN (H): Primary | ICD-10-CM

## 2023-01-01 DIAGNOSIS — M21.6X2 PRONATION DEFORMITY OF BOTH FEET: Primary | ICD-10-CM

## 2023-01-01 DIAGNOSIS — M21.6X1 PRONATION DEFORMITY OF BOTH FEET: Primary | ICD-10-CM

## 2023-01-01 DIAGNOSIS — E87.1 HYPONATREMIA: ICD-10-CM

## 2023-01-01 DIAGNOSIS — I10 BENIGN ESSENTIAL HYPERTENSION: ICD-10-CM

## 2023-01-01 DIAGNOSIS — I10 ESSENTIAL HYPERTENSION, BENIGN: ICD-10-CM

## 2023-01-01 DIAGNOSIS — L85.3 DRY SKIN: ICD-10-CM

## 2023-01-01 DIAGNOSIS — R05.2 SUBACUTE COUGH: Primary | ICD-10-CM

## 2023-01-01 LAB
ALBUMIN UR-MCNC: ABNORMAL MG/DL
ANION GAP SERPL CALCULATED.3IONS-SCNC: 10 MMOL/L (ref 7–15)
APPEARANCE UR: CLEAR
BACTERIA #/AREA URNS HPF: ABNORMAL /HPF
BILIRUB UR QL STRIP: NEGATIVE
BUN SERPL-MCNC: 17 MG/DL (ref 8–23)
CALCIUM SERPL-MCNC: 8.9 MG/DL (ref 8.8–10.2)
CHLORIDE SERPL-SCNC: 107 MMOL/L (ref 98–107)
COLOR UR AUTO: YELLOW
CREAT SERPL-MCNC: 0.9 MG/DL (ref 0.67–1.17)
DEPRECATED HCO3 PLAS-SCNC: 18 MMOL/L (ref 22–29)
ERYTHROCYTE [DISTWIDTH] IN BLOOD BY AUTOMATED COUNT: 13.2 % (ref 10–15)
ERYTHROCYTE [DISTWIDTH] IN BLOOD BY AUTOMATED COUNT: 13.3 % (ref 10–15)
GFR SERPL CREATININE-BSD FRML MDRD: 88 ML/MIN/1.73M2
GLUCOSE SERPL-MCNC: 225 MG/DL (ref 70–99)
GLUCOSE UR STRIP-MCNC: 500 MG/DL
HBA1C MFR BLD: 8.4 % (ref 0–5.6)
HBA1C MFR BLD: 8.8 % (ref 0–5.6)
HCT VFR BLD AUTO: 43.5 % (ref 40–53)
HCT VFR BLD AUTO: 45.6 % (ref 40–53)
HGB BLD-MCNC: 14.8 G/DL (ref 13.3–17.7)
HGB BLD-MCNC: 15.3 G/DL (ref 13.3–17.7)
HGB UR QL STRIP: NEGATIVE
HOLD SPECIMEN: NORMAL
HOLD SPECIMEN: NORMAL
KETONES UR STRIP-MCNC: NEGATIVE MG/DL
LEUKOCYTE ESTERASE UR QL STRIP: NEGATIVE
MCH RBC QN AUTO: 29.4 PG (ref 26.5–33)
MCH RBC QN AUTO: 29.6 PG (ref 26.5–33)
MCHC RBC AUTO-ENTMCNC: 33.6 G/DL (ref 31.5–36.5)
MCHC RBC AUTO-ENTMCNC: 34 G/DL (ref 31.5–36.5)
MCV RBC AUTO: 87 FL (ref 78–100)
MCV RBC AUTO: 88 FL (ref 78–100)
NITRATE UR QL: NEGATIVE
PH UR STRIP: 7 [PH] (ref 5–8)
PLATELET # BLD AUTO: 164 10E3/UL (ref 150–450)
PLATELET # BLD AUTO: 179 10E3/UL (ref 150–450)
POTASSIUM SERPL-SCNC: 4.2 MMOL/L (ref 3.4–5.3)
RBC # BLD AUTO: 5 10E6/UL (ref 4.4–5.9)
RBC # BLD AUTO: 5.2 10E6/UL (ref 4.4–5.9)
RBC #/AREA URNS AUTO: ABNORMAL /HPF
RETINOPATHY: NEGATIVE
RETINOPATHY: NEGATIVE
SODIUM SERPL-SCNC: 135 MMOL/L (ref 136–145)
SP GR UR STRIP: 1.02 (ref 1–1.03)
SQUAMOUS #/AREA URNS AUTO: ABNORMAL /LPF
UROBILINOGEN UR STRIP-ACNC: 0.2 E.U./DL
WBC # BLD AUTO: 7.2 10E3/UL (ref 4–11)
WBC # BLD AUTO: 7.5 10E3/UL (ref 4–11)
WBC #/AREA URNS AUTO: ABNORMAL /HPF

## 2023-01-01 PROCEDURE — 36415 COLL VENOUS BLD VENIPUNCTURE: CPT | Performed by: FAMILY MEDICINE

## 2023-01-01 PROCEDURE — 99213 OFFICE O/P EST LOW 20 MIN: CPT | Performed by: FAMILY MEDICINE

## 2023-01-01 PROCEDURE — 83036 HEMOGLOBIN GLYCOSYLATED A1C: CPT | Performed by: FAMILY MEDICINE

## 2023-01-01 PROCEDURE — 90662 IIV NO PRSV INCREASED AG IM: CPT | Performed by: FAMILY MEDICINE

## 2023-01-01 PROCEDURE — 70551 MRI BRAIN STEM W/O DYE: CPT

## 2023-01-01 PROCEDURE — 85027 COMPLETE CBC AUTOMATED: CPT | Performed by: FAMILY MEDICINE

## 2023-01-01 PROCEDURE — G0008 ADMIN INFLUENZA VIRUS VAC: HCPCS | Performed by: FAMILY MEDICINE

## 2023-01-01 PROCEDURE — 99213 OFFICE O/P EST LOW 20 MIN: CPT | Performed by: PODIATRIST

## 2023-01-01 PROCEDURE — 99214 OFFICE O/P EST MOD 30 MIN: CPT | Mod: 25 | Performed by: FAMILY MEDICINE

## 2023-01-01 PROCEDURE — 80048 BASIC METABOLIC PNL TOTAL CA: CPT | Performed by: FAMILY MEDICINE

## 2023-01-01 PROCEDURE — 81001 URINALYSIS AUTO W/SCOPE: CPT | Performed by: FAMILY MEDICINE

## 2023-01-01 PROCEDURE — 99215 OFFICE O/P EST HI 40 MIN: CPT | Performed by: FAMILY MEDICINE

## 2023-01-01 PROCEDURE — 99214 OFFICE O/P EST MOD 30 MIN: CPT | Performed by: FAMILY MEDICINE

## 2023-01-01 RX ORDER — AMMONIUM LACTATE 12 G/100G
LOTION TOPICAL 2 TIMES DAILY
Qty: 396 G | Refills: 3 | Status: SHIPPED | OUTPATIENT
Start: 2023-01-01

## 2023-01-01 RX ORDER — METFORMIN HCL 500 MG
1000 TABLET, EXTENDED RELEASE 24 HR ORAL
Qty: 180 TABLET | Refills: 3 | Status: SHIPPED | OUTPATIENT
Start: 2023-01-01

## 2023-01-01 RX ORDER — BLOOD SUGAR DIAGNOSTIC
STRIP MISCELLANEOUS
Qty: 100 STRIP | Refills: 1 | Status: SHIPPED | OUTPATIENT
Start: 2023-01-01

## 2023-01-01 RX ORDER — CYCLOPENTOLATE HYDROCHLORIDE 10 MG/ML
1 SOLUTION/ DROPS OPHTHALMIC ONCE
COMMUNITY

## 2023-01-01 RX ORDER — LOSARTAN POTASSIUM 50 MG/1
50 TABLET ORAL DAILY
Qty: 90 TABLET | Refills: 3 | Status: SHIPPED | OUTPATIENT
Start: 2023-01-01

## 2023-01-01 RX ORDER — BLOOD SUGAR DIAGNOSTIC
STRIP MISCELLANEOUS
Qty: 100 STRIP | Refills: 1 | Status: SHIPPED | OUTPATIENT
Start: 2023-01-01 | End: 2023-01-01

## 2023-01-01 RX ORDER — ACETAZOLAMIDE 500 MG/1
500 CAPSULE, EXTENDED RELEASE ORAL 2 TIMES DAILY
COMMUNITY

## 2023-01-01 RX ORDER — ATORVASTATIN CALCIUM 20 MG/1
TABLET, FILM COATED ORAL
Qty: 90 TABLET | Refills: 2 | Status: SHIPPED | OUTPATIENT
Start: 2023-01-01

## 2023-01-01 RX ORDER — ATORVASTATIN CALCIUM 20 MG/1
TABLET, FILM COATED ORAL
Qty: 90 TABLET | Refills: 2 | Status: SHIPPED | OUTPATIENT
Start: 2023-01-01 | End: 2023-01-01

## 2023-01-01 ASSESSMENT — ASTHMA QUESTIONNAIRES
ACT_TOTALSCORE: 25
QUESTION_2 LAST FOUR WEEKS HOW OFTEN HAVE YOU HAD SHORTNESS OF BREATH: NOT AT ALL
QUESTION_1 LAST FOUR WEEKS HOW MUCH OF THE TIME DID YOUR ASTHMA KEEP YOU FROM GETTING AS MUCH DONE AT WORK, SCHOOL OR AT HOME: NONE OF THE TIME
QUESTION_5 LAST FOUR WEEKS HOW WOULD YOU RATE YOUR ASTHMA CONTROL: COMPLETELY CONTROLLED
QUESTION_3 LAST FOUR WEEKS HOW OFTEN DID YOUR ASTHMA SYMPTOMS (WHEEZING, COUGHING, SHORTNESS OF BREATH, CHEST TIGHTNESS OR PAIN) WAKE YOU UP AT NIGHT OR EARLIER THAN USUAL IN THE MORNING: NOT AT ALL
ACT_TOTALSCORE: 25
QUESTION_4 LAST FOUR WEEKS HOW OFTEN HAVE YOU USED YOUR RESCUE INHALER OR NEBULIZER MEDICATION (SUCH AS ALBUTEROL): NOT AT ALL

## 2023-01-01 ASSESSMENT — PAIN SCALES - GENERAL
PAINLEVEL: MILD PAIN (3)
PAINLEVEL: NO PAIN (0)
PAINLEVEL: MILD PAIN (2)

## 2023-01-01 ASSESSMENT — ANXIETY QUESTIONNAIRES
GAD7 TOTAL SCORE: 3
8. IF YOU CHECKED OFF ANY PROBLEMS, HOW DIFFICULT HAVE THESE MADE IT FOR YOU TO DO YOUR WORK, TAKE CARE OF THINGS AT HOME, OR GET ALONG WITH OTHER PEOPLE?: SOMEWHAT DIFFICULT
6. BECOMING EASILY ANNOYED OR IRRITABLE: NOT AT ALL
2. NOT BEING ABLE TO STOP OR CONTROL WORRYING: SEVERAL DAYS
GAD7 TOTAL SCORE: 3
4. TROUBLE RELAXING: NOT AT ALL
5. BEING SO RESTLESS THAT IT IS HARD TO SIT STILL: NOT AT ALL
1. FEELING NERVOUS, ANXIOUS, OR ON EDGE: SEVERAL DAYS
7. FEELING AFRAID AS IF SOMETHING AWFUL MIGHT HAPPEN: NOT AT ALL
3. WORRYING TOO MUCH ABOUT DIFFERENT THINGS: SEVERAL DAYS
7. FEELING AFRAID AS IF SOMETHING AWFUL MIGHT HAPPEN: NOT AT ALL
GAD7 TOTAL SCORE: 3
IF YOU CHECKED OFF ANY PROBLEMS ON THIS QUESTIONNAIRE, HOW DIFFICULT HAVE THESE PROBLEMS MADE IT FOR YOU TO DO YOUR WORK, TAKE CARE OF THINGS AT HOME, OR GET ALONG WITH OTHER PEOPLE: SOMEWHAT DIFFICULT

## 2023-01-01 ASSESSMENT — PATIENT HEALTH QUESTIONNAIRE - PHQ9
SUM OF ALL RESPONSES TO PHQ QUESTIONS 1-9: 10
SUM OF ALL RESPONSES TO PHQ QUESTIONS 1-9: 7
SUM OF ALL RESPONSES TO PHQ QUESTIONS 1-9: 7
10. IF YOU CHECKED OFF ANY PROBLEMS, HOW DIFFICULT HAVE THESE PROBLEMS MADE IT FOR YOU TO DO YOUR WORK, TAKE CARE OF THINGS AT HOME, OR GET ALONG WITH OTHER PEOPLE: SOMEWHAT DIFFICULT
10. IF YOU CHECKED OFF ANY PROBLEMS, HOW DIFFICULT HAVE THESE PROBLEMS MADE IT FOR YOU TO DO YOUR WORK, TAKE CARE OF THINGS AT HOME, OR GET ALONG WITH OTHER PEOPLE: SOMEWHAT DIFFICULT
SUM OF ALL RESPONSES TO PHQ QUESTIONS 1-9: 10

## 2023-02-24 NOTE — LETTER
February 26, 2023      Tashi Cuellar  [address] [place] [address] [address]   [facility] [id number]        Dear ,    We are writing to inform you of your test results.    Continue your current diabetes management as discussed in order to further improve.  Goal A1c < 8.0% initially, < 7.0% ideally.     Your kidney function tests (i.e. Basic Metabolic Panel) were otherwise fine.  We will continue to follow closely.     Your complete blood count results were normal.  No evidence for anemia, etc.     Resulted Orders   Hemoglobin A1c   Result Value Ref Range    Hemoglobin A1C 8.4 (H) 0.0 - 5.6 %      Comment:      Normal <5.7%   Prediabetes 5.7-6.4%    Diabetes 6.5% or higher     Note: Adopted from ADA consensus guidelines.   Basic metabolic panel   Result Value Ref Range    Sodium 135 (L) 136 - 145 mmol/L    Potassium 4.2 3.4 - 5.3 mmol/L    Chloride 107 98 - 107 mmol/L    Carbon Dioxide (CO2) 18 (L) 22 - 29 mmol/L    Anion Gap 10 7 - 15 mmol/L    Urea Nitrogen 17.0 8.0 - 23.0 mg/dL    Creatinine 0.90 0.67 - 1.17 mg/dL    Calcium 8.9 8.8 - 10.2 mg/dL    Glucose 225 (H) 70 - 99 mg/dL    GFR Estimate 88 >60 mL/min/1.73m2      Comment:      eGFR calculated using 2021 CKD-EPI equation.   CBC with platelets   Result Value Ref Range    WBC Count 7.2 4.0 - 11.0 10e3/uL    RBC Count 5.00 4.40 - 5.90 10e6/uL    Hemoglobin 14.8 13.3 - 17.7 g/dL    Hematocrit 43.5 40.0 - 53.0 %    MCV 87 78 - 100 fL    MCH 29.6 26.5 - 33.0 pg    MCHC 34.0 31.5 - 36.5 g/dL    RDW 13.3 10.0 - 15.0 %    Platelet Count 179 150 - 450 10e3/uL       If you have any questions or concerns, please call the clinic at the number listed above.       Sincerely,      Derrick Geronimo MD

## 2023-02-24 NOTE — PROGRESS NOTES
"Assessment/Plan:    Dizziness  Dizziness concerns and off-balance.  Patient had noted headache right forehead in the last week or so with left sided pain in leg and extension into upper left body.  No current neurologic deficit.  This has improved following prior fall x2.  MR brain to be completed due to concern for possible recent TIA/CVA etiology.  Lab assessment completed as well.  Patient to contact 911 or present immediately to ER if recurrent symptoms.  - MR Brain w/o Contrast  - CBC with platelets    Acute nonintractable headache, unspecified headache type  Recent right \"forehead\" headache now resolved.  Saw ophthalmologist who apparently prescribed acetazolamide with glaucoma history and states that headache went away.  No recurrent issues.    Type 2 diabetes mellitus with hyperglycemia, without long-term current use of insulin (H)  Type 2 diabetes reviewed.  A1c increased slightly to 8.4% while on metformin  mg using 2 tablets with supper.  Reassess at scheduled follow-up in next 3 to 4 months with A1c goal remaining less than 8% initially, less than 7% ideally.  - Hemoglobin A1c  - Hemoglobin A1c  - Extra Tube  - Extra Tube  - Basic metabolic panel    Benign essential hypertension  Hypertension appears well controlled blood pressure 128/78 on recheck.  Losartan 50 mg daily continuing.  - Basic metabolic panel    Hyperlipidemia LDL goal <100  Remains on atorvastatin 20 mg daily.    Hyponatremia  Recent mild hyponatremia with sodium 135 and will update renal function electrolytes today.  - Basic metabolic panel            Subjective:    Tashi Cuellar is seen today for feeling \"lousy\".  States the left leg pain however difficult to describe symptoms that do radiate up left side and trunk perhaps.  When symptoms come on he leans to the left.  Had fallen and needed assistance getting up.  Returned to his apartment where he fell again.  Had difficulty initially with walking.  Described prior right " "\"forehead\" headache.  Seen by ophthalmologist in the last week prescribed acetazolamide which seemed to have headache resolved.  History of glaucoma.  Type 2 diabetes noted.  Does not utilize aspirin.  Utilizes metformin  mg taken 2 tablets with supper.  Losartan 50 mg daily for hypertension and atorvastatin 20 mg daily for lipid management.  Comprehensive review of systems as above otherwise all negative.    Single   No children   Tobacco:  never   EtOH:  infrequent   Mom -  69 Alzheimer's disease   Dad -  buried alive as part of construction accident  (patient was only 15)   2 older sis - one  and one in nursing home   Surgeries:  none   Hospitalizations:  kidney stone (possibly right side)     Retired:  worked for construction company   Hobbies:  nothing especially    Past Surgical History:   Procedure Laterality Date     COMBINED CYSTOSCOPY, INSERT STENT URETER(S) Right 3/27/2016    Procedure: CYSTOSCOPY, RIGHT URETERAL STENT PLACEMENT, RIGHT RETROGRADE PYLOGRAM;  Surgeon: Salvador Mina MD;  Location: [facility];  Service:         No family history on file.     Past Medical History:   Diagnosis Date     ASCVD (arteriosclerotic cardiovascular disease)      Asthma      Diabetes mellitus, type II (H)      Gallstones      Glaucoma      Gout      HTN (hypertension)      Hydronephrosis of right kidney         Social History     Tobacco Use     Smoking status: Some Days     Types: Pipe     Smokeless tobacco: Never   Substance Use Topics     Alcohol use: Not Currently     Drug use: Not Currently        Current Outpatient Medications   Medication Sig Dispense Refill     ACETAMINOPHEN PO Take 1,300 mg by mouth 2 times daily as needed       acetaZOLAMIDE (DIAMOX SEQUEL) 500 MG 12 hr capsule Take 500 mg by mouth 2 times daily       albuterol (PROAIR HFA) 90 mcg/actuation inhaler [ALBUTEROL (PROAIR HFA) 90 MCG/ACTUATION INHALER] Inhale 1 puff every 4 (four) hours as needed. Every " 4-6 hours as needed. 1 Inhaler 2     albuterol (PROAIR HFA/PROVENTIL HFA/VENTOLIN HFA) 108 (90 Base) MCG/ACT inhaler Inhale 2 puffs into the lungs every 6 hours Give patient spacer to use with inhaler(VHC/Spacer) 18 g 1     atorvastatin (LIPITOR) 20 MG tablet TAKE 1 TABLET BY MOUTH EVERY DAY 90 tablet 0     bimatoprost (LUMIGAN) 0.03 % ophthalmic drops Place 1 drop into both eyes every evening       blood glucose calibration (ONETOUCH ULTRA CONTROL) solution USE 1 EACH AS DIRECTED AS NEEDED. 100 each 3     blood-glucose meter (ONETOUCH VERIO IQ METER) Misc [BLOOD-GLUCOSE METER (ONETOUCH VERIO IQ METER) MISC] Use 1 each As Directed as needed. 1 each 0     brimonidine (ALPHAGAN) 0.15 % ophthalmic solution [BRIMONIDINE (ALPHAGAN) 0.15 % OPHTHALMIC SOLUTION] Administer 1 drop to both eyes 3 (three) times a day.        calcium carbonate 300 mg (750 mg) Chew [CALCIUM CARBONATE 300 MG (750 MG) CHEW] Chew 3 tablets 2 (two) times a day as needed.       cyclopentolate (CYCLOGYL) 1 % ophthalmic solution 1 drop once       dorzolamide (TRUSOPT) 2 % ophthalmic solution [DORZOLAMIDE (TRUSOPT) 2 % OPHTHALMIC SOLUTION] INSTILL 1 DROP INTO BOTH EYES 3 TIMES A DAY       FIBER PO        ketoconazole (NIZORAL) 2 % external cream        latanoprost (XALATAN) 0.005 % ophthalmic solution INSTILL 1 DROP INTO BOTH EYES EVERY EVENING.       losartan (COZAAR) 50 MG tablet TAKE 1 TABLET BY MOUTH EVERY DAY 90 tablet 3     metFORMIN (GLUCOPHAGE XR) 500 MG 24 hr tablet TAKE 2 TABLETS (1,000 MG) BY MOUTH DAILY (WITH DINNER) 180 tablet 3     ONETOUCH VERIO IQ test strip USE TO TEST DAILY 100 strip 0     prednisoLONE acetate (PRED FORTE) 1 % ophthalmic suspension        tamsulosin (FLOMAX) 0.4 mg Cp24 [TAMSULOSIN (FLOMAX) 0.4 MG CP24] Take 0.4 mg by mouth daily.  3     timolol maleate (TIMOPTIC) 0.5 % ophthalmic solution             Objective:    Vitals:    02/24/23 0745   BP: 136/88   Pulse: 75   Resp: 15   Temp: 98.1  F (36.7  C)   SpO2: 99%    Weight: 80.7 kg (178 lb)      Body mass index is 28.73 kg/m .    Alert.  No apparent distress.  DTRs diminished symmetric bilateral lower extremities and upper extremities.  No hyperreflexia.  Does demonstrate ability to get up from a chair slowly and ambulate in room without significant ataxia.  Chest clear.  Cardiac exam regular.  Cranial nerve exam appears nonfocal.      EXAM: MR BRAIN W WO CONTRAST  LOCATION: [facility]  DATE/TIME: 5/21/2021 6:18 PM     INDICATION: Chronic headache  COMPARISON: Non-  CONTRAST: 7.5 mL Gadavist  TECHNIQUE: Routine multiplanar multisequence head MRI without and with intravenous contrast.     FINDINGS:  INTRACRANIAL CONTENTS: No acute or subacute infarct. No mass, acute hemorrhage, or extra-axial fluid collections. Scattered nonspecific T2/FLAIR hyperintensities within the cerebral white matter most consistent with mild chronic microvascular ischemic   change. Clustered foci of elongated CSF signal intensity spaces within the left parietal/periatrial white matter in a pattern most consistent with prominent perivascular spaces. Mild adjacent gliosis in this area. Minor generalized cerebral atrophy. No   hydrocephalus. Small chronic lacunar infarcts within the right cerebellum. Normal position of the cerebellar tonsils. No pathologic contrast enhancement.     SELLA: No abnormality accounting for technique.     OSSEOUS STRUCTURES/SOFT TISSUES: Normal marrow signal. The major intracranial vascular flow voids are maintained.      ORBITS: Prior bilateral cataract surgery. Visualized portions of the orbits are otherwise unremarkable.      SINUSES/MASTOIDS: No paranasal sinus mucosal disease. No middle ear or mastoid effusion.      IMPRESSION:  1.  No acute intracranial process.  2.  Cluster of presumed prominent perivascular spaces and adjacent gliosis within the left parietal/periatrial white matter.  3.  Generalized brain atrophy and presumed microvascular  ischemic changes as detailed above.        This note has been dictated using voice recognition software and as a result may contain minor grammatical errors and unintended word substitutions.           Answers for HPI/ROS submitted by the patient on 2/24/2023  If you checked off any problems, how difficult have these problems made it for you to do your work, take care of things at home, or get along with other people?: Somewhat difficult  PHQ9 TOTAL SCORE: 10  How many servings of fruits and vegetables do you eat daily?: 0-1  On average, how many sweetened beverages do you drink each day (Examples: soda, juice, sweet tea, etc.  Do NOT count diet or artificially sweetened beverages)?: 1  How many minutes a day do you exercise enough to make your heart beat faster?: 9 or less  How many days a week do you exercise enough to make your heart beat faster?: 3 or less  How many days per week do you miss taking your medication?: 0  What is the reason for your visit today?: legs  When did your symptoms begin?: 1-2 weeks ago  What are your symptoms?: pain  How would you describe these symptoms?: Severe  Are your symptoms:: Worsening  Have you had these symptoms before?: No  Is there anything that makes you feel worse?: mary

## 2023-02-27 NOTE — TELEPHONE ENCOUNTER
----- Message from Derrick Geronimo MD sent at 2/24/2023  4:17 PM CST -----  Notify patient that his MRI today does not show evidence for recent stroke.  Please ensure patient follow-up in office if any recurrent concerns for left sided pain involving leg, body etc.

## 2023-02-27 NOTE — TELEPHONE ENCOUNTER
I called patient and relayed results. Patient verbalized understanding to the plan of care and has no further questions.

## 2023-03-05 NOTE — TELEPHONE ENCOUNTER
"Last Written Prescription Date:  11/16/22  Last Fill Quantity: 100,  # refills: 0   Last office visit provider:  2/24/23     Requested Prescriptions   Pending Prescriptions Disp Refills     ONETOUCH VERIO IQ test strip [Pharmacy Med Name: ONE TOUCH VERIO TEST STRIP] 100 strip 0     Sig: USE TO TEST DAILY       Diabetic Supplies Protocol Passed - 3/4/2023 10:19 AM        Passed - Medication is active on med list        Passed - Patient is 18 years of age or older        Passed - Recent (6 mo) or future (30 days) visit within the authorizing provider's specialty     Patient had office visit in the last 6 months or has a visit in the next 30 days with authorizing provider.  See \"Patient Info\" tab in inbasket, or \"Choose Columns\" in Meds & Orders section of the refill encounter.                 Ramona Jon RN 03/05/23 2:13 PM  "

## 2023-03-20 NOTE — TELEPHONE ENCOUNTER
"Last Written Prescription Date:  12/21/22  Last Fill Quantity: 90,  # refills: 0   Last office visit provider:  2/24/23     Requested Prescriptions   Pending Prescriptions Disp Refills     atorvastatin (LIPITOR) 20 MG tablet [Pharmacy Med Name: ATORVASTATIN 20 MG TABLET] 90 tablet 0     Sig: TAKE 1 TABLET BY MOUTH EVERY DAY       Statins Protocol Passed - 3/20/2023 12:14 AM        Passed - LDL on file in past 12 months     Recent Labs   Lab Test 11/23/22  1024   LDL 51             Passed - No abnormal creatine kinase in past 12 months     No lab results found.             Passed - Recent (12 mo) or future (30 days) visit within the authorizing provider's specialty     Patient has had an office visit with the authorizing provider or a provider within the authorizing providers department within the previous 12 mos or has a future within next 30 days. See \"Patient Info\" tab in inbasket, or \"Choose Columns\" in Meds & Orders section of the refill encounter.              Passed - Medication is active on med list        Passed - Patient is age 18 or older             DINO VALENTE RN 03/20/23 11:25 AM  "

## 2023-04-12 NOTE — PROGRESS NOTES
Assessment & Plan     Type 2 diabetes mellitus with hyperglycemia, without long-term current use of insulin (H)  Patient had what I feel to be a couple of hypoglycemic events in the last 2 to 3 weeks where his legs gave out on him he did not fall or trip but did have to take a rest in a public place as well at home x1  - Hemoglobin A1c    Allergic reaction, initial encounter  Patient is experiencing some difficulty with intermittent asthma and he is concerned that his prednisone eyedrops are causing a flare of his asthma.  He recently had a shunt put in his right eye with an air bubble to try and preserve his retina.  He needs to finish the course of prednisolone which will be a tapered dose in the next 2 to 3 weeks.  I explained that I highly doubt that its the prednisone causing his allergic flare more likely the dust from his 45-year-old shag carpet in his central heating at his apartment.  I suggest he get new carpet for his apartment of a low and hypoallergenic.  He agrees to do that.  I will see him for follow-up for Medicare physical in the fall                   Clifton Mccray MD  [facility]   Tashi is a 77 year old, presenting for the following health issues:  Depression        5/18/2022     7:55 AM   Additional Questions   Roomed by Nadira HANNA see above note with regards to allergic flare probably related to dust in the air as well as in his apartment on his 45-year-old shag carpet.  I do not feel that his prednisone drops flared his asthma.  He is depressed about his eye surgery of 3 weeks ago but he is longterm there.  Hopefully his right eye will heal and his vision will improve.  We will also check his A1c here today.  I will see him for follow-up and hopefully he will get his carpet replaced as outlined in the note above.Constitutional, HEENT, cardiovascular, pulmonary, gi and gu systems are negative, except as otherwise noted.            Review of Systems  "  Constitutional, HEENT, cardiovascular, pulmonary, gi and gu systems are negative, except as otherwise noted.      Objective    /82   Pulse 76   Temp 98.6  F (37  C)   Resp 18   Ht 1.676 m (5' 6\")   Wt 79.4 kg (175 lb)   SpO2 98%   BMI 28.25 kg/m    Body mass index is 28.25 kg/m .  Physical Exam   GENERAL: healthy, alert and no distress  NECK: no adenopathy, no asymmetry, masses, or scars and thyroid normal to palpation  RESP: lungs clear to auscultation - no rales, rhonchi or wheezes  CV: regular rate and rhythm, normal S1 S2, no S3 or S4, no murmur, click or rub, no peripheral edema and peripheral pulses strong  ABDOMEN: soft, nontender, no hepatosplenomegaly, no masses and bowel sounds normal  MS: no gross musculoskeletal defects noted, no edema                    "

## 2023-04-25 NOTE — TELEPHONE ENCOUNTER
"Last Written Prescription Date:  2/11/22  Last Fill Quantity: 90,  # refills: 3   Last office visit provider:  4/12/23     Requested Prescriptions   Pending Prescriptions Disp Refills     losartan (COZAAR) 50 MG tablet [Pharmacy Med Name: LOSARTAN POTASSIUM 50 MG TAB] 90 tablet 3     Sig: TAKE 1 TABLET BY MOUTH EVERY DAY       Angiotensin-II Receptors Passed - 4/25/2023  2:28 PM        Passed - Last blood pressure under 140/90 in past 12 months     BP Readings from Last 3 Encounters:   04/12/23 132/82   02/24/23 136/88   11/23/22 128/76                 Passed - Recent (12 mo) or future (30 days) visit within the authorizing provider's specialty     Patient has had an office visit with the authorizing provider or a provider within the authorizing providers department within the previous 12 mos or has a future within next 30 days. See \"Patient Info\" tab in inbasket, or \"Choose Columns\" in Meds & Orders section of the refill encounter.              Passed - Medication is active on med list        Passed - Patient is age 18 or older        Passed - Normal serum creatinine on file in past 12 months     Recent Labs   Lab Test 02/24/23  0757   CR 0.90       Ok to refill medication if creatinine is low          Passed - Normal serum potassium on file in past 12 months     Recent Labs   Lab Test 02/24/23  0757   POTASSIUM 4.2                         Anson Lopez RN 04/25/23 2:28 PM  "

## 2023-05-23 NOTE — PROGRESS NOTES
"  Assessment & Plan     Type 2 diabetes mellitus with hyperglycemia, without long-term current use of insulin (H)  Patient is doing well; his A1c is acceptable was done last month he is to continue his metformin blood pressure is fine.  He has not changed the carpet in his home yet               BMI:   Estimated body mass index is 28.71 kg/m  as calculated from the following:    Height as of this encounter: 1.676 m (5' 6\").    Weight as of this encounter: 80.7 kg (177 lb 14.4 oz).     Blood sugar testing frequency justification:  Risk of hypoglycemia with medication(s)      Clifton Mccray MD  [facility] OAKBERENICE Akins is a 77 year old, presenting for the following health issues:  Diabetes (Diabetes check )        5/23/2023    10:10 AM   Additional Questions   Roomed by Nova Gordon   Accompanied by Self     HPI Simba is being seen he has some concerns he is got a skin tag on his right neck and he does not want to go to the dermatologist.  We plan on removing this with local anesthesia and excision in the next few weeks.  We will give him an appointment.  I did review his diabetes status he does not need an A1c here today.  No change in his medication at this time.  He is seeing better and that is a relief to him.  We did continue to advise him to change the carpet in his apartment.  All medical questions asked were answered time spent face-to-face non-face-to-face today 20 minutes            Review of Systems   Constitutional, HEENT, cardiovascular, pulmonary, gi and gu systems are negative, except as otherwise noted.      Objective    /80   Pulse 72   Temp 98.1  F (36.7  C) (Temporal)   Resp 18   Ht 1.676 m (5' 6\")   Wt 80.7 kg (177 lb 14.4 oz)   SpO2 96%   BMI 28.71 kg/m    Body mass index is 28.71 kg/m .  Physical Exam   GENERAL: healthy, alert and no distress  NECK: no adenopathy, no asymmetry, masses, or scars and thyroid normal to palpation  RESP: lungs clear to " auscultation - no rales, rhonchi or wheezes  CV: regular rate and rhythm, normal S1 S2, no S3 or S4, no murmur, click or rub, no peripheral edema and peripheral pulses strong  ABDOMEN: soft, nontender, no hepatosplenomegaly, no masses and bowel sounds normal  MS: no gross musculoskeletal defects noted, no edema    Skin tag right posterior triangle of neck

## 2023-06-05 NOTE — TELEPHONE ENCOUNTER
"Nurse Triage SBAR      Situation:   Patient calling stating he has appointment scheduled for tomorrow 6/6/23 in clinic. Reporting ongoing \"cold\" symptoms x 12 days.    Background:     Asthma history.    Assessment:     Patient reporting symptoms starting 12 days ago with a sore throat, and cough.  Reporting no improvement in past 12 days.  Denies known exposure to COVID 19/stating he has not tested in past 12 days.  Afebrile.  Occasional cough. Headache.  Reporting chest \"heaviness\" that he states has been ongoing for 12 days, denies shortness of breath.  Patient has not been using inhalers.  Stating he does not have oximeter or peak flow meter.    Protocol Recommended Disposition:   Disposition to see in ED/UC or clinic with PCP approval. Advised Walk In Care option to patient. Patient refusing stating he has appointment for 6/6/23 and symptoms have not changed in past 12 days.    Reason for Disposition    Chest pain     Reporting heaviness.    Additional Information    Negative: SEVERE asthma attack (e.g., struggling for each breath, speaks in single words, loud wheezes, pulse >120) (RED Zone)    Negative: Bluish (or gray) lips or face    Negative: Difficult to awaken or acting confused (e.g., disoriented, slurred speech)    Negative: Passed out (i.e., fainted, collapsed and was not responding)    Negative: Wheezing started suddenly after medicine, an allergic food, or bee sting    Negative: Sounds like a life-threatening emergency to the triager    Negative: MODERATE asthma attack (e.g., SOB at rest, speaks in phrases, audible wheezes) AND doesn't have neb or inhaler available    Negative: Peak flow rate less than 50% of baseline level (RED Zone)    Negative: Oxygen level (e.g., pulse oximetry) 90 percent or lower    Negative: Hospitalized before with asthma; now feels same    Negative: MODERATE asthma attack (e.g., SOB at rest, speaks in phrases, audible wheezes) and not resolved after 2 or 3 inhaler or " nebulizer treatments given 20 minutes apart    Negative: Peak flow rate 50-79% of baseline level (YELLOW zone) after using 2 or 3 inhaler nebulizer treatments given 20 minutes) apart    Protocols used: ASTHMA ATTACK-A-OH

## 2023-06-06 NOTE — PROGRESS NOTES
"  Assessment & Plan     Subacute cough  Cough cold uri sx's x 10 days now improving; OTC cough medicine suggested               BMI:   Estimated body mass index is 28.63 kg/m  as calculated from the following:    Height as of this encounter: 1.676 m (5' 6\").    Weight as of this encounter: 80.5 kg (177 lb 6.4 oz).           Clifton Mccray MD  [facility] WENDY Akins is a 77 year old, presenting for the following health issues:  Follow Up        6/6/2023     9:21 AM   Additional Questions   Roomed by Nova Gordon   Accompanied by Self     HPI   Intially scheduled to have skin tag removed but shaved it off; has had URIsx's which are resolved but kept appointment; recent A1c rviewed with aptient; skin tag site inspected.No Rx for URI just OTC meds for cough.          Review of Systems   Constitutional, HEENT, cardiovascular, pulmonary, gi and gu systems are negative, except as otherwise noted.      Objective    /80   Pulse 70   Temp 97.8  F (36.6  C) (Temporal)   Resp 18   Ht 1.676 m (5' 6\")   Wt 80.5 kg (177 lb 6.4 oz)   SpO2 98%   BMI 28.63 kg/m    Body mass index is 28.63 kg/m .  Physical Exam   GENERAL: healthy, alert and no distress  NECK: no adenopathy, no asymmetry, masses, or scars and thyroid normal to palpation  RESP: lungs clear to auscultation - no rales, rhonchi or wheezes  CV: regular rate and rhythm, normal S1 S2, no S3 or S4, no murmur, click or rub, no peripheral edema and peripheral pulses strong  ABDOMEN: soft, nontender, no hepatosplenomegaly, no masses and bowel sounds normal  MS: no gross musculoskeletal defects noted, no edema                    "

## 2023-10-03 NOTE — TELEPHONE ENCOUNTER
Triage Call:     Pt calling to report that he has had lower back pain on the right side for the past 1-2 weeks when he lays down in bed at night. This pain is alleviated when he gets out of bed after about an hour or two when he is in the upright position.     Pt states that he sleeps on his side and his stomach, but typically no on his back. He has tried sleeping with a pillow between his knees.     He sometimes takes 1000mg of Tylenol and pain cream to his back    Pain now: 2/10 right lower  Pain at it's worst: 7/10    Disposition: See in office today or tomorrow. Pt was given care advice and was transferred to [facility] to make an appt. He was also given the information for the nearby [facility] [place] [facility].     Reason for Disposition   Age > 50 and no history of prior similar back pain    Additional Information   Negative: Passed out (i.e., fainted, collapsed and was not responding)   Negative: Shock suspected (e.g., cold/pale/clammy skin, too weak to stand, low BP, rapid pulse)   Negative: Sounds like a life-threatening emergency to the triager   Negative: Major injury to the back (e.g., MVA, fall > 10 feet or 3 meters, penetrating injury, etc.)   Negative: Pain in the upper back over the ribs (rib cage) that radiates (travels) into the chest   Negative: Pain in the upper back over the ribs (rib cage) and worsened by coughing (or clearly increases with breathing)   Negative: Back pain during pregnancy   Negative: SEVERE back pain of sudden onset and age > 60 years   Negative: SEVERE abdominal pain (e.g., excruciating)   Negative: Abdominal pain and age > 60 years   Negative: Numbness (loss of sensation) in groin or rectal area   Negative: Unable to urinate (or only a few drops) and bladder feels very full   Negative: Loss of bladder or bowel control (urine or bowel incontinence; wetting self, leaking stool) of new-onset   Negative: Blood in urine (red, pink, or tea-colored)   Negative: Pain radiates into groin, scrotum    Negative: Weakness of a leg or foot (e.g., unable to bear weight, dragging foot)   Negative: Vomiting and pain over lower ribs of back (i.e., flank - kidney area)   Negative: Patient sounds very sick or weak to the triager   Negative: Fever > 100.4 F (38.0 C) and flank pain   Negative: Pain or burning with passing urine (urination)   Negative: SEVERE back pain (e.g., excruciating, unable to do any normal activities) and not improved after pain medicine and CARE ADVICE   Negative: Numbness in an arm or hand (i.e., loss of sensation) and upper back pain   Negative: Numbness in a leg or foot (i.e., loss of sensation)   Negative: High-risk adult (e.g., history of cancer, history of HIV, or history of IV Drug Use)   Negative: Soft tissue infection (e.g., abscess, cellulitis) or other serious infection (e.g., bacteremia) in last 2 weeks   Negative: Painful rash with multiple small blisters grouped together (i.e., dermatomal distribution or 'band' or 'stripe')   Negative: Pain radiates into the thigh or further down the leg, and in both legs    Protocols used: Back Pain-A-[place]  Justina Kelley RN  [facility] Nurse Advisor 9:01 AM 10/3/2023

## 2023-10-10 NOTE — PROGRESS NOTES
Prior to immunization administration, verified patients identity using patient s name and date of birth. Please see Immunization Activity for additional information.     Screening Questionnaire for Adult Immunization    Are you sick today?   No   Do you have allergies to medications, food, a vaccine component or latex?   No   Have you ever had a serious reaction after receiving a vaccination?   No   Do you have a long-term health problem with heart, lung, kidney, or metabolic disease (e.g., diabetes), asthma, a blood disorder, no spleen, complement component deficiency, a cochlear implant, or a spinal fluid leak?  Are you on long-term aspirin therapy?   No   Do you have cancer, leukemia, HIV/AIDS, or any other immune system problem?   No   Do you have a parent, brother, or sister with an immune system problem?   No   In the past 3 months, have you taken medications that affect  your immune system, such as prednisone, other steroids, or anticancer drugs; drugs for the treatment of rheumatoid arthritis, Crohn s disease, or psoriasis; or have you had radiation treatments?   No   Have you had a seizure, or a brain or other nervous system problem?   No   During the past year, have you received a transfusion of blood or blood    products, or been given immune (gamma) globulin or antiviral drug?   No   For women: Are you pregnant or is there a chance you could become       pregnant during the next month?   No   Have you received any vaccinations in the past 4 weeks?   No     Immunization questionnaire answers were all negative.      Patient instructed to remain in clinic for 15 minutes afterwards, and to report any adverse reactions.     Screening performed by Marga Multani RN on 10/10/2023 at 10:27 AM.

## 2023-10-10 NOTE — PROGRESS NOTES
Answers submitted by the patient for this visit:  Back Pain Visit Questionnaire (Submitted on 10/10/2023)  Your back pain is: new  New Back Pain Visit Questionnaire  (Submitted on 10/10/2023)  What do you think is the original cause of your back pain?: not sure  When did you first notice your back pain? : 1-4 weeks ago  How would you describe your back pain? : dull ache  How often do you feel your back pain? : comes and goes  Where is your back pain located? : right lower back  Where does your back pain spread? : nowhere  Since you noticed your back pain, how has it changed? : unchanged  Does your back pain interfere with your job?: Not applicable  On a scale of 1-10 (10 being the worst), how strong is your back pain?: 7  What makes your back pain worse? : lying down  Acupuncture:: not tried  Acetaminophen: helpful  Activity or Exercise: not helpful  Chiropractor: not tried  Cold: not tried  Heat: not helpful  Massage: not tried  Muscle relaxants : not tried  NSAIDS (Ibuprofen, Naproxen) : not tried  Opioids: not tried  Physical Therapy: not tried  Rest: not helpful  Steroid Injection: not tried  Stretching : not helpful  Surgery: not tried  TENS Unit: not tried  Topical pain relievers : not tried  Do you see any other healthcare providers for your back pain? : None  General Questionnaire (Submitted on 10/10/2023)  Chief Complaint: Chronic problems general questions HPI Form  How many servings of fruits and vegetables do you eat daily?: 0-1  On average, how many sweetened beverages do you drink each day (Examples: soda, juice, sweet tea, etc.  Do NOT count diet or artificially sweetened beverages)?: 0  How many minutes a day do you exercise enough to make your heart beat faster?: 9 or less  How many days a week do you exercise enough to make your heart beat faster?: 3 or less  How many days per week do you miss taking your medication?: 0

## 2023-10-10 NOTE — PROGRESS NOTES
"  He is sitting onlyAssessment & Plan     Bilateral low back pain without sciatica, unspecified chronicity    - UA Macroscopic with reflex to Microscopic and Culture - Clinic Collect  - CBC with platelets; Future    Encounter for immunization    - INFLUENZA VACCINE 65+ (FLUZONE HD)    Type 2 diabetes mellitus with hyperglycemia, without long-term current use of insulin (H)    - HEMOGLOBIN A1C; Future             BMI:   Estimated body mass index is 29.86 kg/m  as calculated from the following:    Height as of 6/6/23: 1.676 m (5' 6\").    Weight as of this encounter: 83.9 kg (185 lb).           Clifton Mccray MD  [facility] CenterBERENICE Akins is a 78 year old, presenting for the following health issues:  Musculoskeletal Problem (Lower back pain )      10/10/2023     9:51 AM   Additional Questions   Roomed by fab HANNA Tashi is is being seen today for guidance and follow-up of his type 2 diabetes.  A1c is due today.  He tolerates metformin without difficulty.  Medication review was done today blood pressure I repeated it was 136/88; he will continue with his Cozaar at the present dosage.  He is experiencing some right lower lumbar rib discomfort which is intermittent not present all at times occurs at night sometimes and he states it may wake him up.  He is not experiencing any urgency frequency or dysuria.  No history of trauma.  We will get a check a urine and a hemogram here today his bowels are moving normally he is passing his urine often at night 1-2 times but this is due to his diabetes he noticed no hematuria.  This is probably musculoskeletal in origin time will tell her success and we will see what our work-up shows.            Review of Systems   Constitutional, HEENT, cardiovascular, pulmonary, gi and gu systems are negative, except as otherwise noted.      Objective    BP (!) 182/95 (BP Location: Right arm, Patient Position: Sitting, Cuff Size: Adult Regular)   Pulse 73   " Temp 98.3  F (36.8  C) (Temporal)   Resp 15   Wt 83.9 kg (185 lb)   SpO2 98%   BMI 29.86 kg/m    Body mass index is 29.86 kg/m .  Physical Exam   GENERAL: healthy, alert and no distress  NECK: no adenopathy, no asymmetry, masses, or scars and thyroid normal to palpation  RESP: lungs clear to auscultation - no rales, rhonchi or wheezes  CV: regular rate and rhythm, normal S1 S2, no S3 or S4, no murmur, click or rub, no peripheral edema and peripheral pulses strong  ABDOMEN: soft, nontender, no hepatosplenomegaly, no masses and bowel sounds normal  MS: no gross musculoskeletal defects noted, no edema

## 2023-10-31 NOTE — Clinical Note
10/31/2023         RE: Tashi Cuellar  [address] [place] [address] [address]  [facility] [id number]        Dear Colleague,    Thank you for referring your patient, Tashi Cuellar, to the [facility]. Please see a copy of my visit note below.    FOOT AND ANKLE SURGERY/PODIATRY Progress Note        ASSESSMENT:   ***    HPI: Tashi Cuellar was seen again today  ***.      Past Medical History:   Diagnosis Date     ASCVD (arteriosclerotic cardiovascular disease)      Asthma      Diabetes mellitus, type II (H)      Gallstones      Glaucoma      Gout      HTN (hypertension)      Hydronephrosis of right kidney         Past Surgical History:   Procedure Laterality Date     COMBINED CYSTOSCOPY, INSERT STENT URETER(S) Right 3/27/2016    Procedure: CYSTOSCOPY, RIGHT URETERAL STENT PLACEMENT, RIGHT RETROGRADE PYLOGRAM;  Surgeon: Salvador Mina MD;  Location: [facility] [place];  Service:        No Known Allergies      Current Outpatient Medications:      ACETAMINOPHEN PO, Take 1,300 mg by mouth 2 times daily as needed, Disp: , Rfl:      acetaZOLAMIDE (DIAMOX SEQUEL) 500 MG 12 hr capsule, Take 500 mg by mouth 2 times daily, Disp: , Rfl:      albuterol (PROAIR HFA) 90 mcg/actuation inhaler, [ALBUTEROL (PROAIR HFA) 90 MCG/ACTUATION INHALER] Inhale 1 puff every 4 (four) hours as needed. Every 4-6 hours as needed., Disp: 1 Inhaler, Rfl: 2     albuterol (PROAIR HFA/PROVENTIL HFA/VENTOLIN HFA) 108 (90 Base) MCG/ACT inhaler, Inhale 2 puffs into the lungs every 6 hours Give patient spacer to use with inhaler(VHC/Spacer), Disp: 18 g, Rfl: 1     atorvastatin (LIPITOR) 20 MG tablet, TAKE 1 TABLET BY MOUTH EVERY DAY, Disp: 90 tablet, Rfl: 2     bimatoprost (LUMIGAN) 0.03 % ophthalmic drops, Place 1 drop into both eyes every evening, Disp: , Rfl:      blood glucose (ONETOUCH VERIO IQ) test strip, USE TO TEST DAILY, Disp: 100 strip, Rfl: 1     blood glucose calibration (ONETOUCH ULTRA CONTROL) solution, USE 1  EACH AS DIRECTED AS NEEDED., Disp: 100 each, Rfl: 3     blood-glucose meter (ONETOUCH VERIO IQ METER) Misc, [BLOOD-GLUCOSE METER (ONETOUCH VERIO IQ METER) MISC] Use 1 each As Directed as needed., Disp: 1 each, Rfl: 0     brimonidine (ALPHAGAN) 0.15 % ophthalmic solution, [BRIMONIDINE (ALPHAGAN) 0.15 % OPHTHALMIC SOLUTION] Administer 1 drop to both eyes 3 (three) times a day. , Disp: , Rfl:      calcium carbonate 300 mg (750 mg) Chew, [CALCIUM CARBONATE 300 MG (750 MG) CHEW] Chew 3 tablets 2 (two) times a day as needed., Disp: , Rfl:      dorzolamide (TRUSOPT) 2 % ophthalmic solution, [DORZOLAMIDE (TRUSOPT) 2 % OPHTHALMIC SOLUTION] INSTILL 1 DROP INTO BOTH EYES 3 TIMES A DAY, Disp: , Rfl:      FIBER PO, , Disp: , Rfl:      latanoprost (XALATAN) 0.005 % ophthalmic solution, INSTILL 1 DROP INTO BOTH EYES EVERY EVENING., Disp: , Rfl:      losartan (COZAAR) 50 MG tablet, Take 1 tablet (50 mg) by mouth daily, Disp: 90 tablet, Rfl: 3     metFORMIN (GLUCOPHAGE XR) 500 MG 24 hr tablet, TAKE 2 TABLETS (1,000 MG) BY MOUTH DAILY (WITH DINNER), Disp: 180 tablet, Rfl: 3     prednisoLONE acetate (PRED FORTE) 1 % ophthalmic suspension, , Disp: , Rfl:      tamsulosin (FLOMAX) 0.4 mg Cp24, [TAMSULOSIN (FLOMAX) 0.4 MG CP24] Take 0.4 mg by mouth daily., Disp: , Rfl: 3     timolol maleate (TIMOPTIC) 0.5 % ophthalmic solution, , Disp: , Rfl:      cyclopentolate (CYCLOGYL) 1 % ophthalmic solution, 1 drop once (Patient not taking: Reported on 10/10/2023), Disp: , Rfl:      ketoconazole (NIZORAL) 2 % external cream, , Disp: , Rfl:     No family history on file.    Social History     Socioeconomic History     Marital status: Single     Spouse name: Not on file     Number of children: Not on file     Years of education: Not on file     Highest education level: Not on file   Occupational History     Not on file   Tobacco Use     Smoking status: Some Days     Types: Pipe     Smokeless tobacco: Never   Vaping Use     Vaping Use: Never used    Substance and Sexual Activity     Alcohol use: Not Currently     Drug use: Not Currently     Sexual activity: Not on file   Other Topics Concern     Parent/sibling w/ CABG, MI or angioplasty before 65F 55M? Not Asked   Social History Narrative     Not on file     Social Determinants of Health     Financial Resource Strain: Low Risk  (10/10/2023)    Financial Resource Strain      Within the past 12 months, have you or your family members you live with been unable to get utilities (heat, electricity) when it was really needed?: No   Food Insecurity: Low Risk  (10/10/2023)    Food Insecurity      Within the past 12 months, did you worry that your food would run out before you got money to buy more?: No      Within the past 12 months, did the food you bought just not last and you didn t have money to get more?: No   Transportation Needs: Low Risk  (10/10/2023)    Transportation Needs      Within the past 12 months, has lack of transportation kept you from medical appointments, getting your medicines, non-medical meetings or appointments, work, or from getting things that you need?: No   Physical Activity: Not on file   Stress: Not on file   Social Connections: Not on file   Interpersonal Safety: Low Risk  (10/10/2023)    Interpersonal Safety      Do you feel physically and emotionally safe where you currently live?: Yes      Within the past 12 months, have you been hit, slapped, kicked or otherwise physically hurt by someone?: No      Within the past 12 months, have you been humiliated or emotionally abused in other ways by your partner or ex-partner?: No   Housing Stability: Low Risk  (10/10/2023)    Housing Stability      Do you have housing? : Yes      Are you worried about losing your housing?: No       10 point Review of Systems is negative except for ***.     BP (!) 163/95   Pulse 72   Temp 98.1  F (36.7  C)   Resp 12   SpO2 97%     BMI= There is no height or weight on file to calculate  BMI.    OBJECTIVE:  General appearance: Patient is alert and fully cooperative with history & exam.  No sign of distress is noted during the visit.  Vascular: Dorsalis pedis and posterior tibial pulses are palpable. There is pedal hair growth ***.  CFT < 3 sec from anterior tibial surface to distal digits ***. There is no appreciable edema noted.  Dermatologic: Turgor and texture are within normal limits. No coloration or temperature changes. No primary or secondary lesions noted.  Neurologic: All epicritic and proprioceptive sensations are grossly intact ***.  Musculoskeletal: All active and passive ankle, subtalar, midtarsal, and 1st MPJ range of motion are grossly intact without pain or crepitus, with the exception of ***. Manual muscle strength is ***. All dorsiflexors, plantarflexors, invertors, evertors are intact ***. Tenderness present to *** on palpation. Tenderness to *** with range of motion. Calf is soft/non-tender with/without warmth/induration    Imaging:     {Imaging order/result:97217}    No results found.         TREATMENT:  ***        Mauro Haider DPM  [facility] Foot & Ankle Surgery/Podiatry         Again, thank you for allowing me to participate in the care of your patient.        Sincerely,        Mauro Muniz DPM

## 2023-10-31 NOTE — PATIENT INSTRUCTIONS
What are Prescription Custom Orthotics?  Custom orthotics are specially-made devices designed to support and comfort your feet. Prescription orthotics are crafted for you and no one else. They match the contours of your feet precisely and are designed for the way you move. Orthotics are only manufactured after a podiatrist has conducted a complete evaluation of your feet, ankles, and legs, so the orthotic can accommodate your unique foot structure and pathology.  Prescription orthotics are divided into two categories:  Functional orthotics are designed to control abnormal motion. They may be used to treat foot pain caused by abnormal motion; they can also be used to treat injuries such as shin splints or tendinitis. Functional orthotics are usually crafted of a semi-rigid material such as plastic or graphite.  Accommodative orthotics are softer and meant to provide additional cushioning and support. They can be used to treat diabetic foot ulcers, painful calluses on the bottom of the foot, and other uncomfortable conditions.  Podiatrists use orthotics to treat foot problems such as plantar fasciitis, bursitis, tendinitis, diabetic foot ulcers, and foot, ankle, and heel pain. Clinical research studies have shown that podiatrist-prescribed foot orthotics decrease foot pain and improve function.  Orthotics typically cost more than shoe inserts purchased in a retail store, but the additional cost is usually well worth it. Unlike shoe inserts, orthotics are molded to fit each individual foot, so you can be sure that your orthotics fit and do what they're supposed to do. Prescription orthotics are also made of top-notch materials and last many years when cared for properly. Insurance often helps pay for prescription orthotics.  What are Shoe Inserts?   You've seen them at the grocery store and at the mall. You've probably even seen them on TV and online. Shoe inserts are any kind of non-prescription foot support designed  to be worn inside a shoe. Pre-packaged, mass produced, arch supports are shoe inserts. So are the  custom-made  insoles and foot supports that you can order online or at retail stores. Unless the device has been prescribed by a doctor and crafted for your specific foot, it's a shoe insert, not a custom orthotic device--despite what the ads might say.  Shoe inserts can be very helpful for a variety of foot ailments, including flat arches and foot and leg pain. They can cushion your feet, provide comfort, and support your arches, but they can't correct biomechanical foot problems or cure long-standing foot issues.  The most common types of shoe inserts are:  Arch supports: Some people have high arches. Others have low arches or flat feet. Arch supports generally have a  bumped-up  appearance and are designed to support the foot's natural arch.   Insoles: Insoles slip into your shoe to provide extra cushioning and support. Insoles are often made of gel, foam, or plastic.   Heel liners: Heel liners, sometimes called heel pads or heel cups, provide extra cushioning in the heel region. They may be especially useful for patients who have foot pain caused by age-related thinning of the heels' natural fat pads.   Foot cushions: Do your shoes rub against your heel or your toes? Foot cushions come in many different shapes and sizes and can be used as a barrier between you and your shoe.  Choosing an Over-the-Counter Shoe Insert  Selecting a shoe insert from the wide variety of devices on the market can be overwhelming. Here are some podiatrist-tested tips to help you find the insert that best meets your needs:  Consider your health. Do you have diabetes? Problems with circulation? An over-the-counter insert may not be your best bet. Diabetes and poor circulation increase your risk of foot ulcers and infections, so schedule an appointment with a podiatrist. He or she can help you select a solution that won't cause additional  health problems.   Think about the purpose. Are you planning to run a marathon, or do you just need a little arch support in your work shoes? Look for a product that fits your planned level of activity.   Bring your shoes. For the insert to be effective, it has to fit into your shoes. So bring your sneakers, dress shoes, or work boots--whatever you plan to wear with your insert. Look for an insert that will fit the contours of your shoe.   Try them on. If all possible, slip the insert into your shoe and try it out. Walk around a little. How does it feel? Don't assume that feelings of pressure will go away with continued wear. (If you can't try the inserts at the store, ask about the store's return policy and hold on to your receipt.)    Please call one of the [facility] locations below to schedule an appointment. If you received a prescription please bring it with you to your appointment. Some locations are limited to what they carry.    Office Locations    [facility]  [address] [place] [address]  Home Medical Equipment, [address]   Phone: [phone]   Orthotics and Prosthetics, [address]   Phone: [phone]    [facility]   Home Medical Equipment   [address] [address]  Phone :[phone]  Orthotics and Prosthetics   [address], [address], [facility] [address]  Phone:[phone]          Replaced by [facility]  [address]   [place] [address]   Phone: [phone]    [facility] Professional [facility]ldg[facility]  [address]  [place] [address]  Phone: [phone]    [facility] Medical [facility]ld[facility]   [address]  [place] [address]  Phone: [phone]    [facility]  [address]  [place] [address]  Phone:  [phone]    [facility]  [address]  [place] [address]  Phone: [phone]    [place]   [address] [place] [address]   Phone: [phone]    WEARING YOUR CUSTOM FOOT ORTHOTICS   Most insurance plans cover one pair of orthotics per year. You must check with your   insurance plan to see what your payment responsibility will be. Please call your   insurance company by calling the number on the back of your insurance card.   Orthotic's are non-refundable and non-returnable.   Orthotics are made of various designs. Some orthotics are covered with material that extends beyond your toes. If your orthotic is of this design, you will likely need to trim the toe end to get a proper fit. The insole from your shoe can be used as a template. Simply overlay the shoe insert on top of the custom orthotic. Align the heel end while tracing the length of the insert onto the custom orthotic. Use a large scissor to trim the toe end until you get a proper fit in the shoe.   The orthotic needs to be pushed as far back in the shoe as possible. The heel portion should not ride forward so as not to irritate your heel.   Orthotics are designed to work with socks. Excessive perspiration will shorten the life span of the orthotics. Remove the orthotic from the shoe frequently for proper drying.   The break-in period lasts for weeks. People new to orthotics will likely experience new aches and pains. The orthotic is forcing your foot into a new position. Arch, foot and leg muscle aches and fatigue are common during these weeks. Minor discomfort can be considered normal break in phenomenon. Start wearing your orthotic around your home your first day. Limited activity for one to two hours is recommended. You can increase one or two additional hours each day provided the aches and pains are subsiding. The degree of discomfort, fatigue and problems will dictate the speed of break  in. You may require multiple weeks to work up to full time use.   Do not continue wearing your orthotics if they are creating problems such as blisters or sores. Do not hesitate to call the clinic to speak with a nurse regarding orthotic   break in, fit, trimming, etc. You may also need to see the doctor if the orthotics are   simply not working out. Adjustments are sometimes made to improve orthotic   function.     Orthotics will only work in certain styles and types of shoes. Orthotics rarely work in dress shoes. Slip-ons, clogs, sandals and heels are particularly troublesome. Specially designed orthotics may be necessary for these types of shoes. Your custom orthotic was designed for activities that require appropriate walking or running shoes. Lace up athletic shoes, walking shoes or work boots should work appropriately. You may need a wider or longer shoe. Shoes with a removable  or insert work best. In general, you want to remove an insert from the shoe before placing the orthotic into the shoe. Shoes without a removable liner may not work as well.     When purchasing new shoes, bring your orthotics along to get a proper fit. Shop at stores that are familiar with orthotics.   Frequent washing of the orthotic may shorten the life span of the top cover. The top cover can be replaced but will generally last one to five years depending on use and foot perspiration.

## 2023-10-31 NOTE — PROGRESS NOTES
FOOT AND ANKLE SURGERY/PODIATRY Progress Note        ASSESSMENT:   Pronation deformity bilaterally  Dry skin bilaterally  Type 2 diabetes without complication        TREATMENT:  I have recommended diabetic orthotics.  The patient was also given a prescription for Lac-Hydrin to be applied daily.  The patient is to return to the clinic as needed.           HPI: Tashi Cuellar returns to the clinic today to evaluate bilateral feet.  The patient stated that he is diabetic and he does have some dry skin on the bottom of both feet.  The patient indicated that at this time of year it is particularly more noticeable and exacerbated.  He tries over-the-counter emollients with very little relief.  The patient denies any recent trauma to his feet.  He admits to some numbness on the bottom of both feet.   He has no tingling or pins-and-needles sensation involving both feet.   He has not had any swelling.  He is able to wear shoes with without much discomfort.  He is requesting a note.  Diabetic shoes with inserts.      Past Medical History:   Diagnosis Date    ASCVD (arteriosclerotic cardiovascular disease)     Asthma     Diabetes mellitus, type II (H)     Gallstones     Glaucoma     Gout     HTN (hypertension)     Hydronephrosis of right kidney         Past Surgical History:   Procedure Laterality Date    COMBINED CYSTOSCOPY, INSERT STENT URETER(S) Right 3/27/2016    Procedure: CYSTOSCOPY, RIGHT URETERAL STENT PLACEMENT, RIGHT RETROGRADE PYLOGRAM;  Surgeon: Salvador Mina MD;  [place] [facility] [place]  Service:        No Known Allergies      Current Outpatient Medications:     ACETAMINOPHEN PO, Take 1,300 mg by mouth 2 times daily as needed, Disp: , Rfl:     acetaZOLAMIDE (DIAMOX SEQUEL) 500 MG 12 hr capsule, Take 500 mg by mouth 2 times daily, Disp: , Rfl:     albuterol (PROAIR HFA) 90 mcg/actuation inhaler, [ALBUTEROL (PROAIR HFA) 90 MCG/ACTUATION INHALER] Inhale 1 puff every 4 (four) hours as needed. Every 4-6  hours as needed., Disp: 1 Inhaler, Rfl: 2    albuterol (PROAIR HFA/PROVENTIL HFA/VENTOLIN HFA) 108 (90 Base) MCG/ACT inhaler, Inhale 2 puffs into the lungs every 6 hours Give patient spacer to use with inhaler(VHC/Spacer), Disp: 18 g, Rfl: 1    atorvastatin (LIPITOR) 20 MG tablet, TAKE 1 TABLET BY MOUTH EVERY DAY, Disp: 90 tablet, Rfl: 2    bimatoprost (LUMIGAN) 0.03 % ophthalmic drops, Place 1 drop into both eyes every evening, Disp: , Rfl:     blood glucose (ONETOUCH VERIO IQ) test strip, USE TO TEST DAILY, Disp: 100 strip, Rfl: 1    blood glucose calibration (ONETOUCH ULTRA CONTROL) solution, USE 1 EACH AS DIRECTED AS NEEDED., Disp: 100 each, Rfl: 3    blood-glucose meter (ONETOUCH VERIO IQ METER) Misc, [BLOOD-GLUCOSE METER (ONETOUCH VERIO IQ METER) MISC] Use 1 each As Directed as needed., Disp: 1 each, Rfl: 0    brimonidine (ALPHAGAN) 0.15 % ophthalmic solution, [BRIMONIDINE (ALPHAGAN) 0.15 % OPHTHALMIC SOLUTION] Administer 1 drop to both eyes 3 (three) times a day. , Disp: , Rfl:     calcium carbonate 300 mg (750 mg) Chew, [CALCIUM CARBONATE 300 MG (750 MG) CHEW] Chew 3 tablets 2 (two) times a day as needed., Disp: , Rfl:     dorzolamide (TRUSOPT) 2 % ophthalmic solution, [DORZOLAMIDE (TRUSOPT) 2 % OPHTHALMIC SOLUTION] INSTILL 1 DROP INTO BOTH EYES 3 TIMES A DAY, Disp: , Rfl:     FIBER PO, , Disp: , Rfl:     latanoprost (XALATAN) 0.005 % ophthalmic solution, INSTILL 1 DROP INTO BOTH EYES EVERY EVENING., Disp: , Rfl:     losartan (COZAAR) 50 MG tablet, Take 1 tablet (50 mg) by mouth daily, Disp: 90 tablet, Rfl: 3    metFORMIN (GLUCOPHAGE XR) 500 MG 24 hr tablet, TAKE 2 TABLETS (1,000 MG) BY MOUTH DAILY (WITH DINNER), Disp: 180 tablet, Rfl: 3    prednisoLONE acetate (PRED FORTE) 1 % ophthalmic suspension, , Disp: , Rfl:     tamsulosin (FLOMAX) 0.4 mg Cp24, [TAMSULOSIN (FLOMAX) 0.4 MG CP24] Take 0.4 mg by mouth daily., Disp: , Rfl: 3    timolol maleate (TIMOPTIC) 0.5 % ophthalmic solution, , Disp: , Rfl:      cyclopentolate (CYCLOGYL) 1 % ophthalmic solution, 1 drop once (Patient not taking: Reported on 10/10/2023), Disp: , Rfl:     ketoconazole (NIZORAL) 2 % external cream, , Disp: , Rfl:     No family history on file.    Social History     Socioeconomic History    Marital status: Single     Spouse name: Not on file    Number of children: Not on file    Years of education: Not on file    Highest education level: Not on file   Occupational History    Not on file   Tobacco Use    Smoking status: Some Days     Types: Pipe    Smokeless tobacco: Never   Vaping Use    Vaping Use: Never used   Substance and Sexual Activity    Alcohol use: Not Currently    Drug use: Not Currently    Sexual activity: Not on file   Other Topics Concern    Parent/sibling w/ CABG, MI or angioplasty before 65F 55M? Not Asked   Social History Narrative    Not on file     Social Determinants of Health     Financial Resource Strain: Low Risk  (10/10/2023)    Financial Resource Strain     Within the past 12 months, have you or your family members you live with been unable to get utilities (heat, electricity) when it was really needed?: No   Food Insecurity: Low Risk  (10/10/2023)    Food Insecurity     Within the past 12 months, did you worry that your food would run out before you got money to buy more?: No     Within the past 12 months, did the food you bought just not last and you didn t have money to get more?: No   Transportation Needs: Low Risk  (10/10/2023)    Transportation Needs     Within the past 12 months, has lack of transportation kept you from medical appointments, getting your medicines, non-medical meetings or appointments, work, or from getting things that you need?: No   Physical Activity: Not on file   Stress: Not on file   Social Connections: Not on file   Interpersonal Safety: Low Risk  (10/10/2023)    Interpersonal Safety     Do you feel physically and emotionally safe where you currently live?: Yes     Within the past 12 months,  have you been hit, slapped, kicked or otherwise physically hurt by someone?: No     Within the past 12 months, have you been humiliated or emotionally abused in other ways by your partner or ex-partner?: No   Housing Stability: Low Risk  (10/10/2023)    Housing Stability     Do you have housing? : Yes     Are you worried about losing your housing?: No       10 point Review of Systems is negative     BP (!) 163/95   Pulse 72   Temp 98.1  F (36.7  C)   Resp 12   SpO2 97%     BMI= There is no height or weight on file to calculate BMI.    OBJECTIVE:  General appearance: Patient is alert and fully cooperative with history & exam.  No sign of distress is noted during the visit.  Vascular: Dorsalis pedis and posterior tibial pulses are palpable. There is no pedal hair growth bilaterally.  CFT < 3 sec from anterior tibial surface to distal digits bilaterally. There is no appreciable edema noted.  Dermatologic: Dry scaly skin bilateral feet.  Turgor and texture are within normal limits. No coloration or temperature changes. No primary or secondary lesions noted.  Neurologic: All epicritic and proprioceptive sensations are grossly intact bilaterally.  Negative Tinel sign when percussing the tarsal tunnel and the deep peroneal nerve bilaterally  Musculoskeletal: All active and passive ankle, subtalar, midtarsal, and 1st MPJ range of motion are grossly intact without pain or crepitus, with the exception of none. Manual muscle strength is within normal limits bilaterally. All dorsiflexors, plantarflexors, invertors, evertors are intact bilaterally.  No tenderness present to bilateral feet or ankles on palpation.  No tenderness to bilateral feet or ankles with range of motion.  Flattening of the medial longitudinal arch noted bilaterally.  Calf is soft/non-tender without warmth/induration    Imaging:         No results found.         Mauro Muniz; SLIM  [facility] Foot & Ankle Surgery/Podiatry

## 2024-02-21 DIAGNOSIS — E11.9 DM2 (DIABETES MELLITUS, TYPE 2) (H): ICD-10-CM

## 2024-02-21 RX ORDER — BLOOD SUGAR DIAGNOSTIC
STRIP MISCELLANEOUS
Qty: 100 STRIP | Refills: 1 | OUTPATIENT
Start: 2024-02-21
